# Patient Record
Sex: FEMALE | Race: WHITE | Employment: OTHER | URBAN - METROPOLITAN AREA
[De-identification: names, ages, dates, MRNs, and addresses within clinical notes are randomized per-mention and may not be internally consistent; named-entity substitution may affect disease eponyms.]

---

## 2017-01-17 RX ORDER — ACETAMINOPHEN 500 MG
TABLET ORAL
Qty: 180 TABLET | Refills: 8 | Status: SHIPPED | OUTPATIENT
Start: 2017-01-17 | End: 2018-04-13

## 2017-01-19 RX ORDER — TRAMADOL HYDROCHLORIDE 50 MG/1
TABLET ORAL
Qty: 120 TABLET | Refills: 5 | Status: SHIPPED | OUTPATIENT
Start: 2017-01-19 | End: 2017-08-25

## 2017-01-26 RX ORDER — LOSARTAN POTASSIUM 50 MG/1
TABLET ORAL
Qty: 30 TABLET | Refills: 9 | Status: SHIPPED | OUTPATIENT
Start: 2017-01-26 | End: 2017-09-12

## 2017-01-31 RX ORDER — ALLOPURINOL 300 MG/1
TABLET ORAL
Qty: 30 TABLET | Refills: 6 | Status: SHIPPED | OUTPATIENT
Start: 2017-01-31 | End: 2017-09-12

## 2017-01-31 RX ORDER — OMEPRAZOLE 20 MG/1
CAPSULE, DELAYED RELEASE ORAL
Qty: 30 CAPSULE | Refills: 6 | Status: SHIPPED | OUTPATIENT
Start: 2017-01-31 | End: 2017-09-12

## 2017-01-31 NOTE — TELEPHONE ENCOUNTER
LOV 10/24/16    Last refill Omeprazole 20 4/26/16 # 30 + 9                Allopuroinol 300 3/8/16 # 30 + 10

## 2017-03-02 ENCOUNTER — TELEPHONE (OUTPATIENT)
Dept: INTERNAL MEDICINE CLINIC | Facility: CLINIC | Age: 72
End: 2017-03-02

## 2017-03-02 RX ORDER — ERGOCALCIFEROL 1.25 MG/1
50000 CAPSULE ORAL
Qty: 3 CAPSULE | Refills: 3 | Status: SHIPPED | OUTPATIENT
Start: 2017-03-02 | End: 2017-04-01

## 2017-03-02 NOTE — TELEPHONE ENCOUNTER
Since Tamara's Vit D level is 23 and [facility] will pay only for 50, 000 IU ok to give 50,000 IU cap and have her take that once a month for a year. Check Vit D level in three months.   Ok for Verenice to give her 4  caps and 3 refills but make sure Shane Weaver

## 2017-03-02 NOTE — TELEPHONE ENCOUNTER
Chintan Alert from [address] [place] [address] [id number] is calling regarding Adal Moreno has a prescription for 200,000 units and her insurance will only cover 50,000 units. Please call Racheal Arauz back and advise if 50,000 units can be substituted.      Chintan Alert can be re

## 2017-03-02 NOTE — TELEPHONE ENCOUNTER
Order for Vit D in [facility] [address], will do in 3 months vs in March before OV.

## 2017-03-06 ENCOUNTER — TELEPHONE (OUTPATIENT)
Dept: INTERNAL MEDICINE CLINIC | Facility: CLINIC | Age: 72
End: 2017-03-06

## 2017-03-08 RX ORDER — ASPIRIN 81 MG
TABLET, DELAYED RELEASE (ENTERIC COATED) ORAL
Qty: 30 TABLET | Refills: 11 | Status: SHIPPED | OUTPATIENT
Start: 2017-03-08 | End: 2018-04-18

## 2017-03-21 RX ORDER — SITAGLIPTIN 50 MG/1
TABLET, FILM COATED ORAL
Qty: 30 TABLET | Refills: 3 | Status: SHIPPED | OUTPATIENT
Start: 2017-03-21 | End: 2017-07-17

## 2017-03-21 NOTE — TELEPHONE ENCOUNTER
LOV 10/24/16    Januvia 4/28/16 # 30 +11    Lyrica 100 5/2016 # 90 +5  Sig 3x a day    Routing Lyrica to you for refill

## 2017-03-23 ENCOUNTER — APPOINTMENT (OUTPATIENT)
Dept: LAB | Age: 72
End: 2017-03-23
Attending: INTERNAL MEDICINE
Payer: MEDICARE

## 2017-03-23 DIAGNOSIS — E55.9 VITAMIN D DEFICIENCY DISEASE: ICD-10-CM

## 2017-03-23 DIAGNOSIS — E78.00 HYPERCHOLESTEREMIA: ICD-10-CM

## 2017-03-23 LAB
25-HYDROXYVITAMIN D (TOTAL): 28.1 NG/ML (ref 30–100)
ALT SERPL-CCNC: 15 U/L (ref 14–54)
AST SERPL-CCNC: 14 U/L (ref 15–41)
CHOLEST SMN-MCNC: 126 MG/DL (ref ?–200)
HDLC SERPL-MCNC: 48 MG/DL (ref 45–?)
HDLC SERPL: 2.63 {RATIO} (ref ?–4.44)
LDLC SERPL CALC-MCNC: 44 MG/DL (ref ?–130)
NONHDLC SERPL-MCNC: 78 MG/DL (ref ?–130)
TRIGLYCERIDES: 171 MG/DL (ref ?–150)
VLDL: 34 MG/DL (ref 5–40)

## 2017-03-23 PROCEDURE — 82306 VITAMIN D 25 HYDROXY: CPT

## 2017-03-23 PROCEDURE — 36415 COLL VENOUS BLD VENIPUNCTURE: CPT

## 2017-03-23 PROCEDURE — 84460 ALANINE AMINO (ALT) (SGPT): CPT

## 2017-03-23 PROCEDURE — 84450 TRANSFERASE (AST) (SGOT): CPT

## 2017-03-23 PROCEDURE — 80061 LIPID PANEL: CPT

## 2017-03-27 ENCOUNTER — OFFICE VISIT (OUTPATIENT)
Dept: INTERNAL MEDICINE CLINIC | Facility: CLINIC | Age: 72
End: 2017-03-27

## 2017-03-27 VITALS
TEMPERATURE: 98 F | SYSTOLIC BLOOD PRESSURE: 124 MMHG | DIASTOLIC BLOOD PRESSURE: 76 MMHG | RESPIRATION RATE: 16 BRPM | HEART RATE: 72 BPM

## 2017-03-27 DIAGNOSIS — F32.A CHRONIC DEPRESSION: ICD-10-CM

## 2017-03-27 DIAGNOSIS — Z51.89 ENCOUNTER FOR MEDICATION ADJUSTMENT: Primary | ICD-10-CM

## 2017-03-27 DIAGNOSIS — E11.9 CONTROLLED TYPE 2 DIABETES MELLITUS WITHOUT COMPLICATION, WITHOUT LONG-TERM CURRENT USE OF INSULIN (HCC): ICD-10-CM

## 2017-03-27 DIAGNOSIS — L21.9 SEBORRHEIC DERMATITIS: ICD-10-CM

## 2017-03-27 DIAGNOSIS — S91.109A OPEN TOE WOUND, INITIAL ENCOUNTER: ICD-10-CM

## 2017-03-27 DIAGNOSIS — I10 ESSENTIAL HYPERTENSION: ICD-10-CM

## 2017-03-27 DIAGNOSIS — M79.2 NEUROPATHIC PAIN: ICD-10-CM

## 2017-03-27 DIAGNOSIS — G60.0 PERONEAL MUSCULAR ATROPHY: ICD-10-CM

## 2017-03-27 DIAGNOSIS — Z86.14 HISTORY OF MRSA INFECTION: ICD-10-CM

## 2017-03-27 PROCEDURE — 99214 OFFICE O/P EST MOD 30 MIN: CPT | Performed by: INTERNAL MEDICINE

## 2017-03-27 RX ORDER — DOXYCYCLINE HYCLATE 100 MG/1
100 CAPSULE ORAL 2 TIMES DAILY
Qty: 20 CAPSULE | Refills: 0 | Status: SHIPPED | OUTPATIENT
Start: 2017-03-27 | End: 2017-07-26 | Stop reason: ALTCHOICE

## 2017-03-27 RX ORDER — CYCLOBENZAPRINE HCL 10 MG
10 TABLET ORAL 3 TIMES DAILY
Qty: 60 TABLET | Refills: 3 | Status: SHIPPED | OUTPATIENT
Start: 2017-03-27 | End: 2017-04-16

## 2017-03-27 RX ORDER — FLUOXETINE HYDROCHLORIDE 40 MG/1
40 CAPSULE ORAL DAILY
COMMUNITY
Start: 2017-03-27 | End: 2017-04-24

## 2017-03-27 NOTE — PATIENT INSTRUCTIONS
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

## 2017-03-27 NOTE — PROGRESS NOTES
Patient presents with: Follow - Up: 6 month f/u, open sore second toe L foot       HPI: Jorge Beaver is here for 6 month f/u and sore on 2nd left foot, diabetes, skin lesion on forehead and right arm, cologuard? And pains in foot.      Sore on 2nd left toe: Natasha Tolentino atrophy (Charcot-Rima-Tooth disease)     Anxiety state     Hypertension     Osteoarthritis     ABDIAZIZ on CPAP     Gout     Hypertriglyceridemia     Severe obesity (BMI 35.0-39.9) (HCC)     Incontinence     Hx of right BKA (HCC)     At risk for falling     Ob 60 mg daily for OCD Disp: 30 capsule Rfl: 3   TRAMADOL HCL 50 MG Oral Tab TAKE 1 TO 2 TABLETS BY MOUTH TWO TIMES A DAY IF NEEDED FOR PAIN Disp: 120 tablet Rfl: 5   PRAVASTATIN SODIUM 20 MG Oral Tab TAKE ONE TABLET BY MOUTH AT BEDTIME Disp: 30 tablet Rfl: 7 Temp(Src) 98 °F (36.7 °C) (Oral)  Resp 16  Alert, in no distress  Lungs: Clear, no rales,rhonchi  Heart: S1S2 regular, no murmur, ectopy  GI: soft, nontender, no masses, BS +  Exts: no edema; left foot second toe with 1 cm ulcer open without drainage; toe Essential hypertension, controlled  Plan: CPM    (E11.9) Controlled type 2 diabetes mellitus without complication, without long-term current use of insulin ([facility] controlled?   Plan: HEMOGLOBIN A1C            (F32.9) Chronic depression, improved  Plan: CPM

## 2017-03-30 ENCOUNTER — NURSE ONLY (OUTPATIENT)
Dept: LAB | Age: 72
End: 2017-03-30
Attending: INTERNAL MEDICINE
Payer: MEDICARE

## 2017-03-30 DIAGNOSIS — Z86.14 HISTORY OF MRSA INFECTION: ICD-10-CM

## 2017-03-30 DIAGNOSIS — F42.9 OBSESSIVE-COMPULSIVE DISORDER, UNSPECIFIED TYPE: ICD-10-CM

## 2017-03-30 DIAGNOSIS — Z51.89 ENCOUNTER FOR MEDICATION ADJUSTMENT: Primary | ICD-10-CM

## 2017-03-30 DIAGNOSIS — I10 ESSENTIAL HYPERTENSION: ICD-10-CM

## 2017-03-30 DIAGNOSIS — E11.9 DIABETES MELLITUS WITHOUT COMPLICATION (HCC): ICD-10-CM

## 2017-03-30 DIAGNOSIS — G60.0 PERONEAL MUSCULAR ATROPHY: ICD-10-CM

## 2017-03-30 DIAGNOSIS — L97.521 ULCER OF TOE, LEFT, LIMITED TO BREAKDOWN OF SKIN (HCC): ICD-10-CM

## 2017-03-30 PROCEDURE — 99214 OFFICE O/P EST MOD 30 MIN: CPT | Performed by: INTERNAL MEDICINE

## 2017-04-02 PROBLEM — L97.509 ULCER OF TOE (HCC): Status: ACTIVE | Noted: 2017-04-02

## 2017-04-03 NOTE — PROGRESS NOTES
.Patient presents with: Follow - Up    HPI: Linda Resendiz is here for f/u and a toe wound      She has an open wound on the DIP of her 2nd toe of left foot. She is not sure how it happened but probably a burn from her shoe.   She has no feeling in her feet fr Hypertension     Osteoarthritis     ABDIAZIZ on CPAP     Gout     Hypertriglyceridemia     Severe obesity (BMI 35.0-39.9) (HCC)     Incontinence     Hx of right BKA (HCC)     At risk for falling     Obsessive compulsive disorder     History of amputation of fin MOUTH TWO TIMES A DAY IF NEEDED FOR PAIN Disp: 120 tablet Rfl: 5   MAPAP 500 MG Oral Tab TAKE 1 OR 2 TABLETS 3 TIMES A DAY FOR PAIN Disp: 180 tablet Rfl: 8   PRAVASTATIN SODIUM 20 MG Oral Tab TAKE ONE TABLET BY MOUTH AT BEDTIME Disp: 30 tablet Rfl: 7   MUP 2nd toe with mild erythema, no increased warmth, 0.75 cm superficial ulcer with clean base, no purulent drainage anterior surface DIP.    Bilateral barefoot skin diabetic exam is normal, visualized foot and the appearance is normal.  Bilateral monofilament/

## 2017-04-04 ENCOUNTER — TELEPHONE (OUTPATIENT)
Dept: INTERNAL MEDICINE CLINIC | Facility: CLINIC | Age: 72
End: 2017-04-04

## 2017-04-04 DIAGNOSIS — E53.8 VITAMIN B 12 DEFICIENCY: Primary | ICD-10-CM

## 2017-04-04 NOTE — TELEPHONE ENCOUNTER
Pls advise Praveena Left to get a Vit B 12 level first.  If normal she won't need. Otherwise we will decide based on the level.

## 2017-04-04 NOTE — TELEPHONE ENCOUNTER
Jorge Beaver is calling in with a question about her B12. Jorge Beaver receives monthly B12 injections and was just informed that her insurance will no longer cover the B12 injections. What should she do? Is there an alternative to the injections?  Please call May

## 2017-04-06 ENCOUNTER — TELEPHONE (OUTPATIENT)
Dept: INTERNAL MEDICINE CLINIC | Facility: CLINIC | Age: 72
End: 2017-04-06

## 2017-04-06 ENCOUNTER — APPOINTMENT (OUTPATIENT)
Dept: LAB | Age: 72
End: 2017-04-06
Attending: INTERNAL MEDICINE
Payer: MEDICARE

## 2017-04-06 DIAGNOSIS — E53.8 VITAMIN B 12 DEFICIENCY: Primary | ICD-10-CM

## 2017-04-06 DIAGNOSIS — E53.8 VITAMIN B12 DEFICIENCY: ICD-10-CM

## 2017-04-06 PROCEDURE — 82607 VITAMIN B-12: CPT

## 2017-04-06 PROCEDURE — 36415 COLL VENOUS BLD VENIPUNCTURE: CPT

## 2017-04-06 NOTE — TELEPHONE ENCOUNTER
Medicaid is no longer covering OTC meds and she has question how she is going to stay on Rantidine and XS Tyl as she cannot afford them. Ask Dr. Santhosh Bernabe if there is any prescription rx that could replace them?     Note,she is already on Tramadol for pain d

## 2017-04-06 NOTE — TELEPHONE ENCOUNTER
----- Message from Jonathan Betts MD sent at 4/6/2017  1:02 PM CDT -----  Tamara's Vit B 12 level is normal.  If she is eating red meat she no longer needs Vit B 12 injections. Recheck a level in 6 months.

## 2017-04-10 RX ORDER — RANITIDINE 150 MG/1
150 TABLET ORAL 2 TIMES DAILY
Qty: 60 TABLET | Refills: 5 | Status: CANCELLED | OUTPATIENT
Start: 2017-04-10

## 2017-04-10 NOTE — TELEPHONE ENCOUNTER
Call to [facility] to ask cost OOP for Ranitidine. Pharmacist have never filled this rx for pt.    Will call pt back re request.

## 2017-04-25 ENCOUNTER — TELEPHONE (OUTPATIENT)
Dept: INTERNAL MEDICINE CLINIC | Facility: CLINIC | Age: 72
End: 2017-04-25

## 2017-04-25 RX ORDER — GLIPIZIDE 10 MG/1
10 TABLET, FILM COATED, EXTENDED RELEASE ORAL DAILY
Qty: 30 TABLET | Refills: 2 | Status: SHIPPED | OUTPATIENT
Start: 2017-04-25 | End: 2017-07-24

## 2017-04-25 NOTE — TELEPHONE ENCOUNTER
Britta Leal is calling in wishing to speak with . Britta Leal states that it is not a medical question.  She will be home the rest of today at [facility] is also calling in requesting three new prescriptions to be sent to [facility]

## 2017-04-26 RX ORDER — CLOTRIMAZOLE 1 %
CREAM (GRAM) TOPICAL
Qty: 60 G | Refills: 3 | Status: SHIPPED | OUTPATIENT
Start: 2017-04-26 | End: 2017-09-12

## 2017-05-08 ENCOUNTER — TELEPHONE (OUTPATIENT)
Dept: INTERNAL MEDICINE CLINIC | Facility: CLINIC | Age: 72
End: 2017-05-08

## 2017-05-08 ENCOUNTER — OFFICE VISIT (OUTPATIENT)
Dept: INTERNAL MEDICINE CLINIC | Facility: CLINIC | Age: 72
End: 2017-05-08

## 2017-05-08 VITALS
BODY MASS INDEX: 43.4 KG/M2 | SYSTOLIC BLOOD PRESSURE: 122 MMHG | HEART RATE: 72 BPM | WEIGHT: 293 LBS | HEIGHT: 69 IN | DIASTOLIC BLOOD PRESSURE: 68 MMHG | TEMPERATURE: 98 F

## 2017-05-08 DIAGNOSIS — E11.42 CONTROLLED TYPE 2 DIABETES MELLITUS WITH DIABETIC POLYNEUROPATHY, WITHOUT LONG-TERM CURRENT USE OF INSULIN (HCC): ICD-10-CM

## 2017-05-08 DIAGNOSIS — G60.0 PERONEAL MUSCULAR ATROPHY: Primary | ICD-10-CM

## 2017-05-08 DIAGNOSIS — F32.A CHRONIC DEPRESSION: ICD-10-CM

## 2017-05-08 DIAGNOSIS — G89.4 CHRONIC PAIN SYNDROME: ICD-10-CM

## 2017-05-08 PROCEDURE — 99214 OFFICE O/P EST MOD 30 MIN: CPT | Performed by: INTERNAL MEDICINE

## 2017-05-08 NOTE — PROGRESS NOTES
Phoebe Aguirre is a 66year old female.   Patient presents with:  Depression  Diabetes  Hypertension      HPI:     Patient here to establish care and go over several issues today, with Dr. Mindi Bedolla for many years-  Charcot-Rima Tooth disease with weakness and mouth daily. Disp: 30 tablet Rfl: 2   Mirabegron ER (MYRBETRIQ) 50 MG Oral Tablet 24 Hr Take 1 tablet by mouth once daily. Disp: 30 tablet Rfl: 2   FLUoxetine HCl 40 MG Oral Cap Take 1 capsule (40 mg total) by mouth daily.  Disp: 30 capsule Rfl: 2   Prazosi NEEDED Disp: 22 g Rfl: 4   OYSTER SHELL CALCIUM/D 500-200 MG-UNIT Oral Tab TAKE ONE TABLET BY MOUTH 2 TIMES A DAY Disp: 60 tablet Rfl: 6   LORATADINE 10 MG Oral Tab TAKE ONE TABLET BY MOUTH DAILY Disp: 30 tablet Rfl: 6   MAPAP 500 MG Oral Tab TAKE 1 OR 2 T Allergies    Adhesive Tape             Aspartame                 Clindamycin             Diarrhea  Lisinopril              Coughing  Other                       Comment:Please see extensive Neurotoxic Medication List in             Media before prescri pain service      Orders Placed This Encounter  Comp Metabolic Panel (14) [E]  Hemoglobin A1C [E]    Meds & Refills for this Visit:  No prescriptions requested or ordered in this encounter    Imaging & Consults:  OP REFERRAL PAIN MANGEMENT    Return in abo

## 2017-06-12 ENCOUNTER — TELEPHONE (OUTPATIENT)
Dept: SURGERY | Facility: CLINIC | Age: 72
End: 2017-06-12

## 2017-06-20 RX ORDER — GLIPIZIDE 10 MG/1
TABLET, FILM COATED, EXTENDED RELEASE ORAL
Qty: 30 TABLET | Refills: 0 | OUTPATIENT
Start: 2017-06-20

## 2017-06-23 ENCOUNTER — TELEPHONE (OUTPATIENT)
Dept: INTERNAL MEDICINE CLINIC | Facility: CLINIC | Age: 72
End: 2017-06-23

## 2017-06-23 NOTE — TELEPHONE ENCOUNTER
Received [facility] rx to be signed and fax back . Roland Freeman  Placed on TB bin to review and sign

## 2017-07-18 RX ORDER — SITAGLIPTIN 50 MG/1
TABLET, FILM COATED ORAL
Qty: 30 TABLET | Refills: 0 | Status: SHIPPED | OUTPATIENT
Start: 2017-07-18 | End: 2017-09-18

## 2017-07-18 RX ORDER — MIRABEGRON 50 MG/1
TABLET, FILM COATED, EXTENDED RELEASE ORAL
Qty: 30 TABLET | Refills: 0 | Status: SHIPPED | OUTPATIENT
Start: 2017-07-18 | End: 2017-09-18

## 2017-07-20 ENCOUNTER — TELEPHONE (OUTPATIENT)
Dept: INTERNAL MEDICINE CLINIC | Facility: CLINIC | Age: 72
End: 2017-07-20

## 2017-07-21 ENCOUNTER — TELEPHONE (OUTPATIENT)
Dept: INTERNAL MEDICINE CLINIC | Facility: CLINIC | Age: 72
End: 2017-07-21

## 2017-07-24 RX ORDER — GLIPIZIDE 10 MG/1
TABLET, FILM COATED, EXTENDED RELEASE ORAL
Qty: 30 TABLET | Refills: 0 | Status: SHIPPED | OUTPATIENT
Start: 2017-07-24 | End: 2017-08-22

## 2017-07-26 NOTE — PROGRESS NOTES
Lab called and stated that pt was having difficulty getting on and off the toilet in the lab. Having difficulty giving urine sample. Dr. Stefani Little and providers notified. Per Dr. Stefani Little okay for patient to not give urine sample and to receive RX. Lab updated.

## 2017-07-26 NOTE — PATIENT INSTRUCTIONS
Refill policies:    • Allow 2-3 business days for refills; controlled substances may take longer.   • Contact your pharmacy at least 5 days prior to running out of medication and have them send an electronic request or submit request through the [facility] have a procedure or additional testing performed. [facility]) will contact your insurance carrier to obtain pre-certification or prior authorization.     Unfortunately, [facility] has seen an increase in denial of payment even though the p

## 2017-07-26 NOTE — PROGRESS NOTES
HPI:    Patient ID: Regis Christian is a 66year old female. HPI    Review of Systems         Current Outpatient Prescriptions:  FLUoxetine HCl 40 MG Oral Cap Take 1 capsule (40 mg total) by mouth daily.  Disp: 30 capsule Rfl: 3   Prazosin HCl 2 MG Oral PRAVASTATIN SODIUM 20 MG Oral Tab TAKE ONE TABLET BY MOUTH AT BEDTIME Disp: 30 tablet Rfl: 7   MUPIROCIN 2 % External Ointment APPLY TO OPEN AREA ON LEG 2 TIMES A DAY AS NEEDED Disp: 22 g Rfl: 4   OYSTER SHELL CALCIUM/D 500-200 MG-UNIT Oral Tab TAKE ONE TA

## 2017-07-27 ENCOUNTER — TELEPHONE (OUTPATIENT)
Dept: INTERNAL MEDICINE CLINIC | Facility: CLINIC | Age: 72
End: 2017-07-27

## 2017-07-30 NOTE — PROGRESS NOTES
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 [address]    • Environmental allergies     dust mold    • Essential hypertension    • Food diane Disp: 28 g Rfl: 10   OMEPRAZOLE 20 MG Oral Capsule Delayed Release TAKE ONE CAPSULE BY MOUTH EVERY MORNING Disp: 30 capsule Rfl: 6   ALLOPURINOL 300 MG Oral Tab TAKE ONE TABLET BY MOUTH EVERY MORNING Disp: 30 tablet Rfl: 6   LOSARTAN POTASSIUM 50 MG Oral T Disease Mother      Smoking status: Never Smoker                                                              Smokeless tobacco: Never Used                      Alcohol use:  No                Review of  other systems:  Constitutional: negative for fever, w worse. Spurling’s test is negative. Merline maneuver is normal. Radial pulsation is palpable bilaterally. Phalen’s and Tinnel’s sign is negative. Lhermitte’s test is negative.   Motor Examination:    (R)   (L)  Deltoid:      5    5  Biceps:       5    5  T N   Tamayo:    N    N   Ankle Clonus:    N                          N  Sensory Examination:    (R)   (L)   LI:      N                          N   L2:      N     N   L3:      N               N   L4:      N                          N   L5:      N

## 2017-08-03 ENCOUNTER — LAB ENCOUNTER (OUTPATIENT)
Dept: LAB | Age: 72
End: 2017-08-03
Attending: INTERNAL MEDICINE
Payer: MEDICARE

## 2017-08-03 DIAGNOSIS — E11.42 DIABETIC POLYNEUROPATHY ASSOCIATED WITH TYPE 2 DIABETES MELLITUS (HCC): ICD-10-CM

## 2017-08-03 LAB
EST. AVERAGE GLUCOSE BLD GHB EST-MCNC: 120 MG/DL (ref 68–126)
HBA1C MFR BLD HPLC: 5.8 % (ref ?–5.7)

## 2017-08-03 PROCEDURE — 36415 COLL VENOUS BLD VENIPUNCTURE: CPT

## 2017-08-03 PROCEDURE — 83036 HEMOGLOBIN GLYCOSYLATED A1C: CPT

## 2017-08-07 ENCOUNTER — OFFICE VISIT (OUTPATIENT)
Dept: INTERNAL MEDICINE CLINIC | Facility: CLINIC | Age: 72
End: 2017-08-07

## 2017-08-07 VITALS
RESPIRATION RATE: 16 BRPM | HEART RATE: 80 BPM | HEIGHT: 69 IN | WEIGHT: 293 LBS | DIASTOLIC BLOOD PRESSURE: 60 MMHG | TEMPERATURE: 98 F | SYSTOLIC BLOOD PRESSURE: 114 MMHG | BODY MASS INDEX: 43.4 KG/M2

## 2017-08-07 DIAGNOSIS — R21 RASH OF GROIN: Primary | ICD-10-CM

## 2017-08-07 DIAGNOSIS — G89.29 CHRONIC RIGHT SHOULDER PAIN: ICD-10-CM

## 2017-08-07 DIAGNOSIS — G89.29 CHRONIC BILATERAL LOW BACK PAIN WITHOUT SCIATICA: ICD-10-CM

## 2017-08-07 DIAGNOSIS — E11.42 CONTROLLED TYPE 2 DIABETES MELLITUS WITH DIABETIC POLYNEUROPATHY, WITHOUT LONG-TERM CURRENT USE OF INSULIN (HCC): ICD-10-CM

## 2017-08-07 DIAGNOSIS — M25.511 CHRONIC RIGHT SHOULDER PAIN: ICD-10-CM

## 2017-08-07 DIAGNOSIS — M54.50 CHRONIC BILATERAL LOW BACK PAIN WITHOUT SCIATICA: ICD-10-CM

## 2017-08-07 PROCEDURE — 99214 OFFICE O/P EST MOD 30 MIN: CPT | Performed by: INTERNAL MEDICINE

## 2017-08-07 NOTE — PROGRESS NOTES
Regis Christian is a 66year old female. Patient presents with:   Follow - Up: 3 month  Derm Problem  Diabetes      HPI:     Patient with Charcot-Rima Tooth disease with weakness and neuropathy, she has chronic pain from this along with OA and phantom maritza 60 capsule Rfl: 3   BusPIRone HCl 10 MG Oral Tab Take 2 tablets (20 mg total) by mouth 2 (two) times daily.  Disp: 120 tablet Rfl: 3   FLUoxetine HCl (PROZAC) 20 MG Oral Cap Take one tablet along with the prozac 40 mg daily for a total dose of 60 mg daily f Tab TAKE ONE TABLET BY MOUTH DAILY Disp: 30 tablet Rfl: 6   MAPAP 500 MG Oral Tab TAKE 1 OR 2 TABLETS 3 TIMES A DAY FOR PAIN Disp: 180 tablet Rfl: 0   Calcium Carbonate-Vitamin D (OYSTER SHELL CALCIUM/D) 500-200 MG-UNIT Oral Tab Take 1 tablet by mouth 2 (t above  RESPIRATORY:  no cough  CARDIOVASCULAR: denies chest pain   GI: denies abdominal pain   : no dysuria, hematuria  NEURO: denies headaches, dizziness  RHEUM: chronic back pain  HEME: No adenopathy      EXAM:   /60 (BP Location: Left arm, Patie

## 2017-08-14 RX ORDER — PRAVASTATIN SODIUM 20 MG
TABLET ORAL
Qty: 30 TABLET | Refills: 0 | OUTPATIENT
Start: 2017-08-14

## 2017-08-16 ENCOUNTER — TELEPHONE (OUTPATIENT)
Dept: SURGERY | Facility: CLINIC | Age: 72
End: 2017-08-16

## 2017-08-16 RX ORDER — TRAMADOL HYDROCHLORIDE 100 MG/1
100 TABLET, EXTENDED RELEASE ORAL DAILY
Qty: 30 TABLET | Refills: 0 | Status: SHIPPED | OUTPATIENT
Start: 2017-08-16 | End: 2017-08-25

## 2017-08-16 NOTE — TELEPHONE ENCOUNTER
Received call from pt pharmacy -spoke with Lexa Mack from [facility]- asking for refill for Tramadol.  Will process refill request.

## 2017-08-16 NOTE — TELEPHONE ENCOUNTER
Medication: Tramadol 100mg ER    Date of last refill: 7/26/2017  [place] reviewed 8/34/8861    Last office visit: 7/26/2017  Due back to clinic per last office note:  Not Noted  Date next office visit scheduled:  Not Corey

## 2017-08-17 NOTE — TELEPHONE ENCOUNTER
Pharmacy calling to see if RX can be faxed ASAP. Pt is out of medication. Re-educated on refill policy.

## 2017-08-21 ENCOUNTER — TELEPHONE (OUTPATIENT)
Dept: INTERNAL MEDICINE CLINIC | Facility: CLINIC | Age: 72
End: 2017-08-21

## 2017-08-21 NOTE — TELEPHONE ENCOUNTER
Pt saw Dr Duong Vallecillo and was given a new medication which she is refusing to fill (she has the script with her) stated she doesn't feel like switching to something new due only was seen 2 minutes into the appt and was given this new Tramadol ER 100mg.  She has al

## 2017-08-22 RX ORDER — GLIPIZIDE 10 MG/1
TABLET, FILM COATED, EXTENDED RELEASE ORAL
Qty: 30 TABLET | Refills: 0 | Status: SHIPPED | OUTPATIENT
Start: 2017-08-22 | End: 2017-09-18

## 2017-08-22 NOTE — TELEPHONE ENCOUNTER
LOV 7/26/17 with Dr Chris Moody -Noted at ov :  I will recommend her to continue Lyrica 100 mg p.o. 3 times daily, tramadol 50 mg every 6 as needed and I will add tramadol  mg daily.   She will not be a candidate for interventional treatment at this poin

## 2017-08-22 NOTE — TELEPHONE ENCOUNTER
Spoke to Janina regarding pt Tramadol Rx. Pt Rx changed to Tramadol 50 mg from Tramadol  mg and the script has  sig of do not fill before 8/24/17. RN notified pharm tech that this still stands true for the new Rx.  Our pt are aware of 30 days refi

## 2017-08-25 ENCOUNTER — TELEPHONE (OUTPATIENT)
Dept: INTERNAL MEDICINE CLINIC | Facility: CLINIC | Age: 72
End: 2017-08-25

## 2017-08-25 ENCOUNTER — TELEPHONE (OUTPATIENT)
Dept: SURGERY | Facility: CLINIC | Age: 72
End: 2017-08-25

## 2017-08-25 RX ORDER — TRAMADOL HYDROCHLORIDE 50 MG/1
TABLET ORAL
Qty: 60 TABLET | Refills: 0 | OUTPATIENT
Start: 2017-08-25 | End: 2017-09-12

## 2017-08-25 RX ORDER — TRAMADOL HYDROCHLORIDE 100 MG/1
100 TABLET, EXTENDED RELEASE ORAL DAILY
Qty: 60 TABLET | Refills: 0 | Status: CANCELLED | OUTPATIENT
Start: 2017-08-25

## 2017-08-25 NOTE — TELEPHONE ENCOUNTER
Prescription faxed to [facility] pharmacy, fax [phone] received confirmation sent to triage.

## 2017-08-25 NOTE — TELEPHONE ENCOUNTER
Patient contacted office with multiple complaints regarding recent visits and prescriptions from our office. Patient seen for first visit in clinic in July. Patient states she was unhappy with the visit for a few reasons.  First, patient is wheelchair florencia Tramadol (not Tramadol ER). Patient extremely upset that not only was she provided with a Tramadol ER script again (which patient did not want) but that prescription was postdated and patient went more than a a week without medication.  Patient then states

## 2017-08-25 NOTE — TELEPHONE ENCOUNTER
LOV 8/7/17 TB- Pt was to RTC 11/7/17    Pt states that she saw Dr Allie Seymour had a bad experience and will not be f/u w him and TB is aware. Pt was rx tramadol ER 100mg that Pt did not refill.  Pt states that she picked up her original rx on 8/24/17 for Tramadol

## 2017-08-25 NOTE — TELEPHONE ENCOUNTER
Called pt to advise info per TB. Pt verbalized understanding, agreed with POC and had  no further questions. Would like Rx faxed.

## 2017-08-25 NOTE — TELEPHONE ENCOUNTER
Patient calling very upset asking to speak with TB multiple times, in regards to her Tramadol rx. States that she has gone a week and half without her medication. ALso wants to speak about her referral to Don Keating and his medication management.

## 2017-08-28 RX ORDER — OMEPRAZOLE 20 MG/1
CAPSULE, DELAYED RELEASE ORAL
Qty: 30 CAPSULE | Refills: 0 | OUTPATIENT
Start: 2017-08-28

## 2017-08-28 RX ORDER — LORATADINE 10 MG/1
TABLET ORAL
Qty: 30 TABLET | Refills: 0 | OUTPATIENT
Start: 2017-08-28

## 2017-08-28 RX ORDER — ALLOPURINOL 300 MG/1
TABLET ORAL
Qty: 30 TABLET | Refills: 0 | OUTPATIENT
Start: 2017-08-28

## 2017-08-30 NOTE — TELEPHONE ENCOUNTER
Patient states she has had time to [facility] off\" since our conversation last week, states she would like to give Dr. Jerris Cranker another chance. Patient requesting to schedule additional visit to ask Dr. Jerris Cranker questions she was unable to previously.  Patient schedul

## 2017-09-05 RX ORDER — CLOTRIMAZOLE 1 %
CREAM (GRAM) TOPICAL
Qty: 60 G | Refills: 0 | OUTPATIENT
Start: 2017-09-05

## 2017-09-12 ENCOUNTER — TELEPHONE (OUTPATIENT)
Dept: INTERNAL MEDICINE CLINIC | Facility: CLINIC | Age: 72
End: 2017-09-12

## 2017-09-12 RX ORDER — LOSARTAN POTASSIUM 50 MG/1
TABLET ORAL
Qty: 30 TABLET | Refills: 9 | Status: SHIPPED | OUTPATIENT
Start: 2017-09-12 | End: 2018-06-04

## 2017-09-12 RX ORDER — LORATADINE 10 MG/1
10 TABLET ORAL
Qty: 30 TABLET | Refills: 6 | Status: SHIPPED | OUTPATIENT
Start: 2017-09-12 | End: 2018-03-26

## 2017-09-12 RX ORDER — PREGABALIN 100 MG/1
CAPSULE ORAL
Qty: 90 CAPSULE | Refills: 2 | Status: SHIPPED | OUTPATIENT
Start: 2017-09-12 | End: 2017-12-12

## 2017-09-12 RX ORDER — PRAVASTATIN SODIUM 20 MG
TABLET ORAL
Qty: 30 TABLET | Refills: 7 | Status: SHIPPED | OUTPATIENT
Start: 2017-09-12 | End: 2018-05-15

## 2017-09-12 RX ORDER — TRAMADOL HYDROCHLORIDE 50 MG/1
TABLET ORAL
Qty: 120 TABLET | Refills: 1 | Status: SHIPPED | OUTPATIENT
Start: 2017-09-12 | End: 2017-12-06

## 2017-09-12 RX ORDER — CLOTRIMAZOLE 1 %
CREAM (GRAM) TOPICAL
Qty: 60 G | Refills: 3 | Status: SHIPPED | OUTPATIENT
Start: 2017-09-12 | End: 2018-01-08

## 2017-09-12 RX ORDER — ALLOPURINOL 300 MG/1
TABLET ORAL
Qty: 30 TABLET | Refills: 6 | Status: SHIPPED | OUTPATIENT
Start: 2017-09-12 | End: 2018-03-26

## 2017-09-12 RX ORDER — PRAVASTATIN SODIUM 20 MG
TABLET ORAL
Qty: 30 TABLET | Refills: 0 | OUTPATIENT
Start: 2017-09-12

## 2017-09-12 RX ORDER — OMEPRAZOLE 20 MG/1
CAPSULE, DELAYED RELEASE ORAL
Qty: 30 CAPSULE | Refills: 6 | Status: SHIPPED | OUTPATIENT
Start: 2017-09-12 | End: 2018-03-26

## 2017-09-12 NOTE — TELEPHONE ENCOUNTER
Previous pt of Dr Daron Mckee will need refills for the following. ...     LORATADINE 10 MG Oral Tab [Pharmacy Med Name: LORATADINE 10 MG TABLET] 30 tablet 0 8/28/2017    Sig:  TAKE ONE TABLET BY MOUTH DAILY               OMEPRAZOLE 20 MG Oral Capsule Delayed Relea

## 2017-09-13 NOTE — TELEPHONE ENCOUNTER
Prescription Tramadol and Pregabalin was sent to [facility], fax [phone] received confirmation, sent to triage.

## 2017-09-18 RX ORDER — MIRABEGRON 50 MG/1
TABLET, FILM COATED, EXTENDED RELEASE ORAL
Qty: 30 TABLET | Refills: 0 | Status: SHIPPED | OUTPATIENT
Start: 2017-09-18 | End: 2017-10-16

## 2017-09-18 RX ORDER — SITAGLIPTIN 50 MG/1
TABLET, FILM COATED ORAL
Qty: 30 TABLET | Refills: 0 | Status: SHIPPED | OUTPATIENT
Start: 2017-09-18 | End: 2017-10-16

## 2017-09-18 RX ORDER — GLIPIZIDE 10 MG/1
TABLET, FILM COATED, EXTENDED RELEASE ORAL
Qty: 30 TABLET | Refills: 0 | Status: SHIPPED | OUTPATIENT
Start: 2017-09-18 | End: 2017-10-16

## 2017-09-20 ENCOUNTER — OFFICE VISIT (OUTPATIENT)
Dept: SURGERY | Facility: CLINIC | Age: 72
End: 2017-09-20

## 2017-09-20 VITALS — HEART RATE: 73 BPM | BODY MASS INDEX: 45 KG/M2 | WEIGHT: 293 LBS | RESPIRATION RATE: 20 BRPM | OXYGEN SATURATION: 91 %

## 2017-09-20 DIAGNOSIS — G60.9 IDIOPATHIC PERIPHERAL NEUROPATHY: Primary | ICD-10-CM

## 2017-09-20 PROCEDURE — 99213 OFFICE O/P EST LOW 20 MIN: CPT | Performed by: ANESTHESIOLOGY

## 2017-09-20 RX ORDER — CHOLECALCIFEROL (VITAMIN D3) 1250 MCG
CAPSULE ORAL
COMMUNITY
End: 2018-06-04

## 2017-09-20 RX ORDER — NEOMYCIN SULFATE, POLYMYXIN B SULFATE AND DEXAMETHASONE 3.5; 10000; 1 MG/ML; [USP'U]/ML; MG/ML
SUSPENSION/ DROPS OPHTHALMIC
COMMUNITY
Start: 2017-09-15 | End: 2018-06-04

## 2017-09-20 NOTE — PATIENT INSTRUCTIONS
Refill policies:    • Allow 2-3 business days for refills; controlled substances may take longer.   • Contact your pharmacy at least 5 days prior to running out of medication and have them send an electronic request or submit request through the [facility] have a procedure or additional testing performed. [facility]) will contact your insurance carrier to obtain pre-certification or prior authorization.     Unfortunately, [facility] has seen an increase in denial of payment even though the p

## 2017-09-22 NOTE — PROGRESS NOTES
Name: Ishan Plaza   : 1945   DOS: 2017     Pain Clinic Follow Up Visit:   Ishan Plaza is a 66year old female who presents for recheck of her chronic neck, lower back and generalized neuropathic pain for more than 25 years.   She was s TAKE ONE TABLET BY MOUTH EVERY MORNING Disp: 30 tablet Rfl: 6   pregabalin (LYRICA) 100 MG Oral Cap TAKE ONE CAPSULE BY MOUTH 3 TIMES A DAY Disp: 90 capsule Rfl: 2   FLUoxetine HCl 40 MG Oral Cap Take 1 capsule (40 mg total) by mouth daily.  Disp: 30 capsul makes the pain worse. Elevation of the head makes the pain better, compression of  the head makes the pain worse. Spurling’s test is negative. Aromas maneuver is normal. Radial pulsation is palpable bilaterally. Phalen’s and Tinnel’s sign is negative.  Lh N               C7:                                                           N                                    N               C8:                                                           N 5                                     5  Gastrocsoleus:                                         5                                     5     Deep Tendon Reflexes:                                   (R) write tramadol so patient does not need to come to the pain clinic and also this is the policy of the clinic to do the urine drug tox screening. Orders:No orders of the defined types were placed in this encounter.       Medications filled today:  No pres

## 2017-10-16 RX ORDER — SITAGLIPTIN 50 MG/1
TABLET, FILM COATED ORAL
Qty: 30 TABLET | Refills: 0 | Status: SHIPPED | OUTPATIENT
Start: 2017-10-16 | End: 2017-11-20

## 2017-10-16 RX ORDER — MIRABEGRON 50 MG/1
TABLET, FILM COATED, EXTENDED RELEASE ORAL
Qty: 30 TABLET | Refills: 0 | Status: SHIPPED | OUTPATIENT
Start: 2017-10-16 | End: 2017-11-13

## 2017-10-16 RX ORDER — GLIPIZIDE 10 MG/1
TABLET, FILM COATED, EXTENDED RELEASE ORAL
Qty: 30 TABLET | Refills: 0 | Status: SHIPPED | OUTPATIENT
Start: 2017-10-16 | End: 2017-11-20

## 2017-10-18 ENCOUNTER — TELEPHONE (OUTPATIENT)
Dept: INTERNAL MEDICINE CLINIC | Facility: CLINIC | Age: 72
End: 2017-10-18

## 2017-10-20 ENCOUNTER — TELEPHONE (OUTPATIENT)
Dept: INTERNAL MEDICINE CLINIC | Facility: CLINIC | Age: 72
End: 2017-10-20

## 2017-10-23 RX ORDER — B-COMPLEX WITH VITAMIN C
TABLET ORAL
Qty: 60 TABLET | Refills: 0 | OUTPATIENT
Start: 2017-10-23

## 2017-11-08 RX ORDER — WATER
LIQUID (ML) MISCELLANEOUS
Qty: 3780 ML | Refills: 10 | Status: SHIPPED | OUTPATIENT
Start: 2017-11-08 | End: 2018-06-04

## 2017-11-08 NOTE — TELEPHONE ENCOUNTER
Pharm called just now stating they need a verbal ok to fill this today for the patient-please call them back at [phone]

## 2017-11-13 RX ORDER — MIRABEGRON 50 MG/1
TABLET, FILM COATED, EXTENDED RELEASE ORAL
Qty: 30 TABLET | Refills: 0 | Status: SHIPPED | OUTPATIENT
Start: 2017-11-13 | End: 2017-12-12

## 2017-11-20 RX ORDER — SITAGLIPTIN 50 MG/1
TABLET, FILM COATED ORAL
Qty: 30 TABLET | Refills: 0 | Status: SHIPPED | OUTPATIENT
Start: 2017-11-20 | End: 2017-12-18

## 2017-11-20 RX ORDER — GLIPIZIDE 10 MG/1
TABLET, FILM COATED, EXTENDED RELEASE ORAL
Qty: 30 TABLET | Refills: 0 | Status: SHIPPED | OUTPATIENT
Start: 2017-11-20 | End: 2017-12-18

## 2017-11-21 ENCOUNTER — APPOINTMENT (OUTPATIENT)
Dept: LAB | Age: 72
End: 2017-11-21
Attending: INTERNAL MEDICINE
Payer: MEDICARE

## 2017-11-21 DIAGNOSIS — E11.69 CONTROLLED TYPE 2 DIABETES MELLITUS WITH OTHER SPECIFIED COMPLICATION, WITHOUT LONG-TERM CURRENT USE OF INSULIN (HCC): ICD-10-CM

## 2017-11-21 PROCEDURE — 36415 COLL VENOUS BLD VENIPUNCTURE: CPT

## 2017-11-21 PROCEDURE — 83036 HEMOGLOBIN GLYCOSYLATED A1C: CPT

## 2017-11-21 PROCEDURE — 80053 COMPREHEN METABOLIC PANEL: CPT

## 2017-11-28 RX ORDER — B-COMPLEX WITH VITAMIN C
TABLET ORAL
Qty: 60 TABLET | Refills: 0 | OUTPATIENT
Start: 2017-11-28

## 2017-12-07 RX ORDER — TRAMADOL HYDROCHLORIDE 50 MG/1
TABLET ORAL
Qty: 120 TABLET | Refills: 0 | Status: SHIPPED | OUTPATIENT
Start: 2017-12-07 | End: 2017-12-20

## 2017-12-12 RX ORDER — MIRABEGRON 50 MG/1
TABLET, FILM COATED, EXTENDED RELEASE ORAL
Qty: 30 TABLET | Refills: 10 | Status: SHIPPED | OUTPATIENT
Start: 2017-12-12 | End: 2018-06-04

## 2017-12-12 NOTE — TELEPHONE ENCOUNTER
Last Office Visit: 8-7-17 with TB for 3 month follow up   Last Rx Filled: 9-12-17 90 tabs with 2 refills   Last Labs: 11-21-17 cmp/hga1c  Future Appointment: 12-20-17    Per protocol to provider

## 2017-12-12 NOTE — TELEPHONE ENCOUNTER
LOV: 8/7/17  Future office visit: 12/20/2017   Last labs: 11/21/17 Cmp A1C   Last RX: 11/13/17 #30 no Refills  Per protocol: Route to provider

## 2017-12-14 RX ORDER — PREGABALIN 100 MG/1
100 CAPSULE ORAL 3 TIMES DAILY
Qty: 90 CAPSULE | Refills: 1 | Status: SHIPPED | OUTPATIENT
Start: 2017-12-14 | End: 2017-12-20

## 2017-12-14 NOTE — TELEPHONE ENCOUNTER
[facility] awaiting approval for refill on Lyrica. Pt had only enough med for this am.  Please advise. Call Lena Zavala at [employer] [phone].

## 2017-12-18 RX ORDER — B-COMPLEX WITH VITAMIN C
TABLET ORAL
Qty: 60 TABLET | Refills: 0 | OUTPATIENT
Start: 2017-12-18

## 2017-12-19 RX ORDER — GLIPIZIDE 10 MG/1
TABLET, FILM COATED, EXTENDED RELEASE ORAL
Qty: 30 TABLET | Refills: 0 | Status: SHIPPED | OUTPATIENT
Start: 2017-12-19 | End: 2017-12-20

## 2017-12-19 RX ORDER — SITAGLIPTIN 50 MG/1
TABLET, FILM COATED ORAL
Qty: 30 TABLET | Refills: 0 | Status: SHIPPED | OUTPATIENT
Start: 2017-12-19 | End: 2017-12-20

## 2017-12-20 ENCOUNTER — TELEPHONE (OUTPATIENT)
Dept: INTERNAL MEDICINE CLINIC | Facility: CLINIC | Age: 72
End: 2017-12-20

## 2017-12-20 ENCOUNTER — OFFICE VISIT (OUTPATIENT)
Dept: INTERNAL MEDICINE CLINIC | Facility: CLINIC | Age: 72
End: 2017-12-20

## 2017-12-20 VITALS
RESPIRATION RATE: 15 BRPM | DIASTOLIC BLOOD PRESSURE: 70 MMHG | SYSTOLIC BLOOD PRESSURE: 130 MMHG | TEMPERATURE: 98 F | WEIGHT: 293 LBS | BODY MASS INDEX: 44.41 KG/M2 | HEIGHT: 68 IN | HEART RATE: 72 BPM

## 2017-12-20 DIAGNOSIS — F32.A ANXIETY AND DEPRESSION: ICD-10-CM

## 2017-12-20 DIAGNOSIS — F41.9 ANXIETY AND DEPRESSION: ICD-10-CM

## 2017-12-20 DIAGNOSIS — E78.49 OTHER HYPERLIPIDEMIA: ICD-10-CM

## 2017-12-20 DIAGNOSIS — Z12.11 SCREEN FOR COLON CANCER: ICD-10-CM

## 2017-12-20 DIAGNOSIS — I10 ESSENTIAL HYPERTENSION: ICD-10-CM

## 2017-12-20 DIAGNOSIS — Z00.00 WELLNESS EXAMINATION: Primary | ICD-10-CM

## 2017-12-20 DIAGNOSIS — Z89.511 HX OF RIGHT BKA (HCC): ICD-10-CM

## 2017-12-20 DIAGNOSIS — G47.33 OSA ON CPAP: ICD-10-CM

## 2017-12-20 DIAGNOSIS — E11.42 CONTROLLED TYPE 2 DIABETES MELLITUS WITH DIABETIC POLYNEUROPATHY, WITHOUT LONG-TERM CURRENT USE OF INSULIN (HCC): ICD-10-CM

## 2017-12-20 DIAGNOSIS — G89.4 CHRONIC PAIN SYNDROME: ICD-10-CM

## 2017-12-20 DIAGNOSIS — Z12.31 ENCOUNTER FOR SCREENING MAMMOGRAM FOR MALIGNANT NEOPLASM OF BREAST: ICD-10-CM

## 2017-12-20 DIAGNOSIS — Z23 NEED FOR PNEUMOCOCCAL VACCINATION: ICD-10-CM

## 2017-12-20 DIAGNOSIS — M79.2 NEUROPATHIC PAIN: ICD-10-CM

## 2017-12-20 DIAGNOSIS — Z99.89 OSA ON CPAP: ICD-10-CM

## 2017-12-20 DIAGNOSIS — F41.1 ANXIETY STATE: ICD-10-CM

## 2017-12-20 DIAGNOSIS — G60.0 PERONEAL MUSCULAR ATROPHY: ICD-10-CM

## 2017-12-20 PROCEDURE — 90732 PPSV23 VACC 2 YRS+ SUBQ/IM: CPT | Performed by: INTERNAL MEDICINE

## 2017-12-20 PROCEDURE — G0009 ADMIN PNEUMOCOCCAL VACCINE: HCPCS | Performed by: INTERNAL MEDICINE

## 2017-12-20 PROCEDURE — G0439 PPPS, SUBSEQ VISIT: HCPCS | Performed by: INTERNAL MEDICINE

## 2017-12-20 RX ORDER — TRAMADOL HYDROCHLORIDE 50 MG/1
TABLET ORAL
Qty: 120 TABLET | Refills: 3 | Status: SHIPPED | OUTPATIENT
Start: 2017-12-20 | End: 2018-05-15

## 2017-12-20 RX ORDER — GLIPIZIDE 10 MG/1
10 TABLET, FILM COATED, EXTENDED RELEASE ORAL
Qty: 30 TABLET | Refills: 5 | Status: SHIPPED | OUTPATIENT
Start: 2017-12-20 | End: 2018-06-18

## 2017-12-20 RX ORDER — B-COMPLEX WITH VITAMIN C
TABLET ORAL
Qty: 60 TABLET | Refills: 6 | Status: SHIPPED | OUTPATIENT
Start: 2017-12-20 | End: 2018-06-04

## 2017-12-20 RX ORDER — PREGABALIN 100 MG/1
100 CAPSULE ORAL 3 TIMES DAILY
Qty: 90 CAPSULE | Refills: 3 | Status: SHIPPED | OUTPATIENT
Start: 2017-12-20 | End: 2018-05-08

## 2017-12-20 RX ORDER — TOBRAMYCIN AND DEXAMETHASONE 3; 1 MG/ML; MG/ML
SUSPENSION/ DROPS OPHTHALMIC
COMMUNITY
Start: 2017-12-05 | End: 2018-06-04

## 2017-12-20 RX ORDER — CYCLOBENZAPRINE HCL 10 MG
10 TABLET ORAL NIGHTLY PRN
Qty: 30 TABLET | Refills: 3 | Status: SHIPPED | OUTPATIENT
Start: 2017-12-20 | End: 2018-01-09

## 2017-12-20 NOTE — PROGRESS NOTES
Jn Pedroza is a 66year old female who presents for a Medicare Annual Wellness visit.       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  Disp: 30 tablet Rfl: 6   allopurinol 300 MG Oral Tab TAKE ONE TABLET BY MOUTH EVERY MORNING Disp: 30 tablet Rfl: 6   ASPIR-LOW 81 MG Or Value Date   BUN 20 11/21/2017   BUN 22 (H) 10/20/2016   BUN 24 (H) 06/03/2016   CREATSERUM 0.86 11/21/2017   CREATSERUM 0.85 10/20/2016   CREATSERUM 1.01 06/03/2016       General Health     In the past six months, have you lost more than 10 pounds without you on multiple medications?: 1-Yes    Does pain affect your day to day activities?: 1-Yes     Have you had any memory issues?: 0-No    Fall/Risk Scorin          Depression Screening (PHQ-2/PHQ-9): Over the LAST 2 WEEKS   Little interest or pleasure in Glaucoma Screening      Ophthalmology Visit Annually 2 weeks ago    Bone Density Screening      Dexascan Every two years Last Dexa Scan:   XR DEXA BONE DENSITOMETRY (CPT=77080) 09/10/2013    No flowsheet data found.     Pap and Pelvic      Pap: Every 3 yr HgbA1C  Annually HGBA1C (%)   Date Value   06/24/2014 7.1 (H)     HgbA1C (%)   Date Value   11/21/2017 6.2 (H)    No flowsheet data found.     Creat/alb ratio  Annually      LDL  Annually LDL CHOLESTROL (no units)   Date Value   10/20/2016 26     LDL Choles 10   FLUoxetine HCl 40 MG Oral Cap Take 1 capsule (40 mg total) by mouth daily. Disp: 30 capsule Rfl: 3   Prazosin HCl 2 MG Oral Cap Take 2 capsules (4 mg total) by mouth nightly.  Disp: 60 capsule Rfl: 3   BusPIRone HCl 10 MG Oral Tab Take 2 tablets (20 mg Ophthalmic Suspension  Disp:  Rfl:       MEDICAL INFORMATION:   Past Medical History:   Diagnosis Date   • Acute osteomyelitis of right hand ([facility] [address]            GLOBAL EXP 9-30-16 / RIGHT THUMB / BAM  5/31/2016   • Anxiety    • Carrier or suspected carrier of denies nasal congestion, sinus pain or ST  LUNGS: denies shortness of breath with exertion  CARDIOVASCULAR: denies chest pain on exertion  GI: denies abdominal pain, denies heartburn  : denies dysuria   MUSCULOSKELETAL: chronic pain everywhere  NEURO: de Future    Hx of right BKA (HCC)    ABDIAZIZ on CPAP    Essential hypertension- controlled, cpm    Anxiety state and chronic depression- no SI/HI, on meds per Dr. Filippo Blandon but since he is retired, referred to Dr. Ronda Guidry    [facility] 09/18/2012, 10/04/2013, 09/09/2014, 09/26/2016   • Pneumococcal (Prevnar 13) 10/24/2016   • Pneumovax 23 12/12/2007, 12/20/2017   • TDAP 04/07/2016   • Zoster (Shingles) 02/29/2012

## 2018-01-05 RX ORDER — ERGOCALCIFEROL 1.25 MG/1
CAPSULE ORAL
Qty: 1 CAPSULE | Refills: 0 | Status: SHIPPED | OUTPATIENT
Start: 2018-01-05 | End: 2018-03-06

## 2018-01-08 RX ORDER — CLOTRIMAZOLE 1 %
CREAM (GRAM) TOPICAL
Qty: 60 G | Refills: 0 | Status: SHIPPED | OUTPATIENT
Start: 2018-01-08 | End: 2018-03-26

## 2018-01-09 NOTE — TELEPHONE ENCOUNTER
LOV: 12/20/17  Future office visit: 3/20/18  Last labs: 11/21/17 Cmp A1C   Last RX: 9/12/17 #60 g #3 Refills  Per protocol: Route to provider

## 2018-02-08 ENCOUNTER — TELEPHONE (OUTPATIENT)
Dept: INTERNAL MEDICINE CLINIC | Facility: CLINIC | Age: 73
End: 2018-02-08

## 2018-02-13 NOTE — TELEPHONE ENCOUNTER
Pharmacy called stated this is urgent because they have to have an answer before 1pm to be able to ship the medication to the pt. Please advise.

## 2018-02-13 NOTE — TELEPHONE ENCOUNTER
LOV: 12/20/17  Future office visit: 3/20/18  Last labs: 11/21/17 Cmp A1C  Last RX: Hydrocortisone 2/7/17 #28 G #10 Refills  Per protocol: Route to provider

## 2018-02-13 NOTE — TELEPHONE ENCOUNTER
[address] [facility] ([phone])  calling to FU On script for Hydrocortisone 2.5 %   TB is now PCP not Dr. Melinda Barnes.

## 2018-02-23 ENCOUNTER — TELEPHONE (OUTPATIENT)
Dept: INTERNAL MEDICINE CLINIC | Facility: CLINIC | Age: 73
End: 2018-02-23

## 2018-02-23 NOTE — TELEPHONE ENCOUNTER
Calling to inquired about the Form that was sent to us on 2/13/18. from [employer] called the [facility]\" Please advise checking on the status. She will be faxing the form to us once again.

## 2018-03-06 RX ORDER — ERGOCALCIFEROL 1.25 MG/1
CAPSULE ORAL
Qty: 1 CAPSULE | Refills: 0 | Status: SHIPPED | OUTPATIENT
Start: 2018-03-06 | End: 2018-04-13

## 2018-03-06 NOTE — TELEPHONE ENCOUNTER
Last Vit D completed was 3/2017 . Ok to resend a refill?     LOV: 12/20/17   Future office visit: 3/20/18   Last labs: Cmp A1C 11/21/17 3/23/17 Lipid Vit D   Last RX: Vit D 1/5/18 #1 Cap No Refills  Per protocol: Route to provider

## 2018-03-06 NOTE — TELEPHONE ENCOUNTER
Requested this rx via [employer]. Nothing on file.  Pt needs a refill on   ERGOCALCIFEROL 13157 units Oral Cap 1 capsule 0 1/5/2018    Sig :  TAKE ONE CAPSULE BY MOUTH MONTHLY       Please advise

## 2018-03-06 NOTE — TELEPHONE ENCOUNTER
Medication(s) to Refill:   Pending Prescriptions Disp Refills    ERGOCALCIFEROL 79301 units Oral Cap [Pharmacy Med Name: VITAMIN D GELCAP (50,000 UNIT] 1 capsule 0     Sig: TAKE ONE CAPSULE BY MOUTH MONTHLY             Reason for Medication Refill being se

## 2018-03-12 NOTE — TELEPHONE ENCOUNTER
Medication(s) to Refill:   Pending Prescriptions Disp Refills    HYDROCORTISONE 2.5 % External Cream [Pharmacy Med Name: HYDROCORTISONE 2.5% CREAM] 28 g 0     Sig: APPLY TO AFFECTED AREA 2 TIMES A DAY - USE TOGETHER WITH LOTRIMIN CREAM (CLOTRIMAZOLE)

## 2018-03-14 ENCOUNTER — TELEPHONE (OUTPATIENT)
Dept: INTERNAL MEDICINE CLINIC | Facility: CLINIC | Age: 73
End: 2018-03-14

## 2018-03-26 RX ORDER — LORATADINE 10 MG/1
TABLET ORAL
Qty: 30 TABLET | Refills: 0 | Status: SHIPPED | OUTPATIENT
Start: 2018-03-26 | End: 2018-06-04

## 2018-03-26 RX ORDER — ALLOPURINOL 300 MG/1
TABLET ORAL
Qty: 30 TABLET | Refills: 0 | Status: SHIPPED | OUTPATIENT
Start: 2018-03-26 | End: 2018-05-31

## 2018-03-26 RX ORDER — CLOTRIMAZOLE 1 %
CREAM (GRAM) TOPICAL
Qty: 60 G | Refills: 0 | Status: SHIPPED | OUTPATIENT
Start: 2018-03-26 | End: 2018-04-30

## 2018-03-26 RX ORDER — OMEPRAZOLE 20 MG/1
CAPSULE, DELAYED RELEASE ORAL
Qty: 30 CAPSULE | Refills: 0 | Status: SHIPPED | OUTPATIENT
Start: 2018-03-26 | End: 2018-05-21

## 2018-03-26 NOTE — TELEPHONE ENCOUNTER
Medication(s) to Refill:   Pending Prescriptions Disp Refills    OMEPRAZOLE 20 MG Oral Capsule Delayed Release [Pharmacy Med Name: OMEPRAZOLE DR 20 MG CAPSULE] 30 capsule 0     Sig: TAKE ONE CAPSULE BY MOUTH EVERY MORNING      ALLERGY RELIEF 10 MG Oral Tab

## 2018-03-26 NOTE — TELEPHONE ENCOUNTER
Medication(s) to Refill:   Pending Prescriptions Disp Refills    CLOTRIMAZOLE 1 % External Cream [Pharmacy Med Name: CLOTRIMAZOLE 1% CREAM] 60 g 0     Sig: APPLY TO AFFECTED AREA 2 TIMES A DAY - USE WITH HYDROCORTISONE CREAM             Reason for Jamil Neither

## 2018-03-27 ENCOUNTER — NURSE ONLY (OUTPATIENT)
Dept: LAB | Age: 73
End: 2018-03-27
Attending: INTERNAL MEDICINE
Payer: MEDICARE

## 2018-03-27 DIAGNOSIS — E11.42 CONTROLLED TYPE 2 DIABETES MELLITUS WITH DIABETIC POLYNEUROPATHY, WITHOUT LONG-TERM CURRENT USE OF INSULIN (HCC): ICD-10-CM

## 2018-03-27 DIAGNOSIS — E78.49 OTHER HYPERLIPIDEMIA: ICD-10-CM

## 2018-03-27 LAB
ALBUMIN SERPL-MCNC: 3.1 G/DL (ref 3.5–4.8)
ALP LIVER SERPL-CCNC: 123 U/L (ref 55–142)
ALT SERPL-CCNC: 15 U/L (ref 14–54)
AST SERPL-CCNC: 14 U/L (ref 15–41)
BILIRUB SERPL-MCNC: 0.4 MG/DL (ref 0.1–2)
BUN BLD-MCNC: 19 MG/DL (ref 8–20)
CALCIUM BLD-MCNC: 8.6 MG/DL (ref 8.3–10.3)
CHLORIDE: 107 MMOL/L (ref 101–111)
CHOLEST SMN-MCNC: 119 MG/DL (ref ?–200)
CO2: 30 MMOL/L (ref 22–32)
CREAT BLD-MCNC: 0.75 MG/DL (ref 0.55–1.02)
CREAT UR-SCNC: 116 MG/DL
EST. AVERAGE GLUCOSE BLD GHB EST-MCNC: 134 MG/DL (ref 68–126)
GLUCOSE BLD-MCNC: 101 MG/DL (ref 70–99)
HBA1C MFR BLD HPLC: 6.3 % (ref ?–5.7)
HDLC SERPL-MCNC: 42 MG/DL (ref 45–?)
HDLC SERPL: 2.83 {RATIO} (ref ?–4.44)
LDLC SERPL CALC-MCNC: 35 MG/DL (ref ?–130)
M PROTEIN MFR SERPL ELPH: 6.7 G/DL (ref 6.1–8.3)
MICROALBUMIN UR-MCNC: 5.4 MG/DL
MICROALBUMIN/CREAT 24H UR-RTO: 46.6 UG/MG (ref ?–30)
NONHDLC SERPL-MCNC: 77 MG/DL (ref ?–130)
POTASSIUM SERPL-SCNC: 4.2 MMOL/L (ref 3.6–5.1)
SODIUM SERPL-SCNC: 141 MMOL/L (ref 136–144)
TRIGL SERPL-MCNC: 209 MG/DL (ref ?–150)
TSI SER-ACNC: 1.49 MIU/ML (ref 0.35–5.5)
VLDLC SERPL CALC-MCNC: 42 MG/DL (ref 5–40)

## 2018-03-27 PROCEDURE — 82043 UR ALBUMIN QUANTITATIVE: CPT

## 2018-03-27 PROCEDURE — 36415 COLL VENOUS BLD VENIPUNCTURE: CPT

## 2018-03-27 PROCEDURE — 83036 HEMOGLOBIN GLYCOSYLATED A1C: CPT

## 2018-03-27 PROCEDURE — 84443 ASSAY THYROID STIM HORMONE: CPT

## 2018-03-27 PROCEDURE — 80061 LIPID PANEL: CPT

## 2018-03-27 PROCEDURE — 80053 COMPREHEN METABOLIC PANEL: CPT

## 2018-03-27 PROCEDURE — 82570 ASSAY OF URINE CREATININE: CPT

## 2018-04-02 RX ORDER — ERGOCALCIFEROL 1.25 MG/1
CAPSULE ORAL
Qty: 1 CAPSULE | Refills: 5 | Status: SHIPPED | OUTPATIENT
Start: 2018-04-02 | End: 2018-06-04

## 2018-04-02 NOTE — TELEPHONE ENCOUNTER
LOV: 12/20/17 w/ TB for MWV  FOV: 4/6/18  Last labs: 3/27/18 Microalbumin,TSH,LIPID,CMP,A1C  Last Refill: 3/6/18 qt:1    Per protocol sent to provider

## 2018-04-03 RX ORDER — ERGOCALCIFEROL 1.25 MG/1
CAPSULE ORAL
Qty: 1 CAPSULE | Refills: 10 | OUTPATIENT
Start: 2018-04-03

## 2018-04-03 RX ORDER — ACETAMINOPHEN 500 MG
TABLET ORAL
Qty: 180 TABLET | Refills: 0 | OUTPATIENT
Start: 2018-04-03

## 2018-04-06 RX ORDER — ACETAMINOPHEN 500 MG
TABLET ORAL
Qty: 180 TABLET | Refills: 0 | OUTPATIENT
Start: 2018-04-06

## 2018-04-09 RX ORDER — ACETAMINOPHEN 500 MG
TABLET ORAL
Qty: 180 TABLET | Refills: 0 | Status: SHIPPED | OUTPATIENT
Start: 2018-04-09 | End: 2018-06-04

## 2018-04-09 RX ORDER — ACETAMINOPHEN 500 MG
TABLET ORAL
Qty: 180 TABLET | Refills: 0 | OUTPATIENT
Start: 2018-04-09

## 2018-04-09 RX ORDER — ACETAMINOPHEN 500 MG
1000 TABLET ORAL 3 TIMES DAILY PRN
Refills: 0 | OUTPATIENT
Start: 2018-04-09

## 2018-04-09 NOTE — TELEPHONE ENCOUNTER
LOV: 12/20/17 w/ TB for MWV  FOV: 4/13/18  Last labs: 3/27/18 TSH,LIPID,CMP,A1C,Microalbumin  Last Refill: Lyrica 12/20/17 qt:90 3 refills (too soon to fill denied)  Hydrocortisone 3/12/18 qt:28 g    Per protocol sent to provider

## 2018-04-09 NOTE — TELEPHONE ENCOUNTER
Pharm calling to let us know pt needs new rx for   Acetaminophen 500 MG TAKE 1 OR 2 TABLETS 3 TIMES A DAY   Sent to them-was getting it from Dr. Debbie Sahni before

## 2018-04-10 NOTE — TELEPHONE ENCOUNTER
Michelle Burrell MD   [facility] 19 hours ago (7:06 PM)     Can get otc (Routing comment)            Tried calling patient no VM set up, unable to leave message. Will try calling again later.

## 2018-04-13 ENCOUNTER — OFFICE VISIT (OUTPATIENT)
Dept: INTERNAL MEDICINE CLINIC | Facility: CLINIC | Age: 73
End: 2018-04-13

## 2018-04-13 VITALS
DIASTOLIC BLOOD PRESSURE: 70 MMHG | WEIGHT: 293 LBS | BODY MASS INDEX: 47 KG/M2 | HEART RATE: 72 BPM | SYSTOLIC BLOOD PRESSURE: 126 MMHG | TEMPERATURE: 98 F | RESPIRATION RATE: 16 BRPM

## 2018-04-13 DIAGNOSIS — Z99.89 OSA ON CPAP: ICD-10-CM

## 2018-04-13 DIAGNOSIS — I10 ESSENTIAL HYPERTENSION: ICD-10-CM

## 2018-04-13 DIAGNOSIS — Z89.511 HX OF RIGHT BKA (HCC): ICD-10-CM

## 2018-04-13 DIAGNOSIS — G47.33 OSA ON CPAP: ICD-10-CM

## 2018-04-13 DIAGNOSIS — G89.4 CHRONIC PAIN SYNDROME: ICD-10-CM

## 2018-04-13 DIAGNOSIS — E78.5 DYSLIPIDEMIA: ICD-10-CM

## 2018-04-13 DIAGNOSIS — E11.42 CONTROLLED TYPE 2 DIABETES MELLITUS WITH DIABETIC POLYNEUROPATHY, WITHOUT LONG-TERM CURRENT USE OF INSULIN (HCC): Primary | ICD-10-CM

## 2018-04-13 PROCEDURE — 99214 OFFICE O/P EST MOD 30 MIN: CPT | Performed by: INTERNAL MEDICINE

## 2018-04-13 RX ORDER — TRAZODONE HYDROCHLORIDE 50 MG/1
25 TABLET ORAL NIGHTLY
COMMUNITY
Start: 2018-03-30 | End: 2019-12-10

## 2018-04-13 NOTE — PROGRESS NOTES
Beti Manriquez is a 74year old female. Patient presents with: Follow - Up: routine.  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,  chronic pain from this along with OA and phantom pains (BKA RLE), HTN, H APPLY TO AFFECTED AREA 2 TIMES A DAY - USE WITH HYDROCORTISONE CREAM Disp: 60 g Rfl: 0   tobramycin-dexamethasone 0.3-0.1 % Ophthalmic Suspension  Disp:  Rfl:    pregabalin (LYRICA) 100 MG Oral Cap Take 1 capsule (100 mg total) by mouth 3 (three) times umm tablet Rfl: 11      Past Medical History:   Diagnosis Date   • Acute osteomyelitis of right hand ([address]            GLOBAL EXP 9-30-16 / RIGHT THUMB / BAM  5/31/2016   • Anxiety    • 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  No   BMI 46.98 kg/m²   GENERAL: obese, wheelchair bound  HEENT: atraumatic, normocephalic  NECK: supple,no adenopathy  LUNGS: normal rate without respiratory distress, lungs clear to

## 2018-04-16 RX ORDER — ASPIRIN 81 MG/1
TABLET ORAL
Qty: 30 TABLET | Refills: 0 | OUTPATIENT
Start: 2018-04-16

## 2018-04-17 ENCOUNTER — TELEPHONE (OUTPATIENT)
Dept: INTERNAL MEDICINE CLINIC | Facility: CLINIC | Age: 73
End: 2018-04-17

## 2018-04-17 NOTE — TELEPHONE ENCOUNTER
Pharmacy states the Acetaminophen has been covered by insurance and was dispensed on 4/9/18. Pt picked up 30 day supply.

## 2018-04-18 RX ORDER — ASPIRIN 81 MG/1
TABLET ORAL
Qty: 30 TABLET | Refills: 11 | Status: SHIPPED | OUTPATIENT
Start: 2018-04-18 | End: 2018-06-04

## 2018-04-18 NOTE — TELEPHONE ENCOUNTER
LOV: 4/13/18 TB  Future office visit: No upcoming visit   Last labs: 3/27/18 Micro Tsh Lipid Cmp A1C   Last RX: ASA 3/8/17 #30 #11 Refill  Per protocol: Route to provider

## 2018-04-18 NOTE — TELEPHONE ENCOUNTER
[facility] is calling about this request. It was sent to the wrong [facility] office.  Please advise

## 2018-04-30 RX ORDER — CLOTRIMAZOLE 1 %
CREAM (GRAM) TOPICAL
Qty: 60 G | Refills: 0 | Status: SHIPPED | OUTPATIENT
Start: 2018-04-30 | End: 2018-06-01

## 2018-04-30 NOTE — TELEPHONE ENCOUNTER
LOV: 4/13/18 w/ TB  FOV: None   Last labs: 3/27/18  Last Refill: 3/26/18 qt:60 g  Per protocol sent to provider

## 2018-05-05 NOTE — TELEPHONE ENCOUNTER
LOV: 04/13/2018  Future Visit: [address] appointment on 4/6/18, no future appointments scheduled   Last Labs: 03/27/2018 Hemoglobin A1C, COMP, Lipid, TSH, Microalb   Last Rx: 04/09/2018    Per protocol

## 2018-05-08 ENCOUNTER — TELEPHONE (OUTPATIENT)
Dept: INTERNAL MEDICINE CLINIC | Facility: CLINIC | Age: 73
End: 2018-05-08

## 2018-05-08 RX ORDER — PREGABALIN 100 MG/1
100 CAPSULE ORAL 3 TIMES DAILY
Qty: 90 CAPSULE | Refills: 0 | Status: SHIPPED | OUTPATIENT
Start: 2018-05-08 | End: 2018-06-18

## 2018-05-08 RX ORDER — ACETAMINOPHEN 500 MG
TABLET ORAL
Qty: 180 TABLET | Refills: 10 | Status: SHIPPED | OUTPATIENT
Start: 2018-05-08 | End: 2018-06-04

## 2018-05-08 NOTE — TELEPHONE ENCOUNTER
LOV: 04/13/2018  Future Visit: No appointment scheduled   Last Labs: 03/27/2018 Microalb, TSH, Lipid, COMP, Hemoglobin A1C   Last Rx: 04/09/2018     Per protocol

## 2018-05-08 NOTE — TELEPHONE ENCOUNTER
Consultation note for diabetic footwear from [facility] foot physicians was placed on TB bin for signature.

## 2018-05-08 NOTE — TELEPHONE ENCOUNTER
LOV: 04/13/2018  Future Visit: No appointment scheduled   Last Labs: 03/27/2018, Microalb, TSH, Lipid panel, COMP, Hemoglobin A1C   Last Rx: 12/20/2017 (90 cap with 3 refills)     Per protocol: RX needs to be printed

## 2018-05-09 NOTE — TELEPHONE ENCOUNTER
Pharmacy called to check the status of this refill. She states the patient is out of this medication. Please advise.

## 2018-05-15 RX ORDER — PRAVASTATIN SODIUM 20 MG
TABLET ORAL
Qty: 90 TABLET | Refills: 3 | Status: SHIPPED | OUTPATIENT
Start: 2018-05-15 | End: 2018-06-04

## 2018-05-15 RX ORDER — TRAMADOL HYDROCHLORIDE 50 MG/1
TABLET ORAL
Qty: 120 TABLET | Refills: 0 | Status: SHIPPED | OUTPATIENT
Start: 2018-05-15 | End: 2018-06-04

## 2018-05-15 NOTE — TELEPHONE ENCOUNTER
LOV: 4/13/18   Future office visit: No upcoming visit   Last labs: 3/27/18 Micro Tsh Lipid Cmp A1C  Last RX: Tramadol 12/20/17 #120 #3 Refill  Per protocol: Route to provider

## 2018-05-21 NOTE — TELEPHONE ENCOUNTER
LOV: 4/13/18 w/ TB  FOV: None  Last labs: 3/27/18  Last Refill: Cyclobenzaprine 12/20/17 qt:30 3 refills  Omeprazole 3/26/18  qt:30     Per protocol sent to provider

## 2018-05-22 RX ORDER — OMEPRAZOLE 20 MG/1
CAPSULE, DELAYED RELEASE ORAL
Qty: 30 CAPSULE | Refills: 2 | Status: SHIPPED | OUTPATIENT
Start: 2018-05-22 | End: 2018-06-04

## 2018-05-22 RX ORDER — CYCLOBENZAPRINE HCL 10 MG
TABLET ORAL
Qty: 30 TABLET | Refills: 2 | Status: SHIPPED | OUTPATIENT
Start: 2018-05-22 | End: 2018-06-04

## 2018-05-24 ENCOUNTER — PATIENT OUTREACH (OUTPATIENT)
Dept: CASE MANAGEMENT | Age: 73
End: 2018-05-24

## 2018-05-31 RX ORDER — ALLOPURINOL 300 MG/1
TABLET ORAL
Qty: 90 TABLET | Refills: 1 | Status: SHIPPED | OUTPATIENT
Start: 2018-05-31 | End: 2018-06-04

## 2018-05-31 NOTE — TELEPHONE ENCOUNTER
LOV: 4/13/18 w/ TB  FOV: None  Last labs: 3/27/18  Last Refill: Hydrocortisone 5/5/18 qt:28 g  3/26/18 qt:30    Per protocol sent to provider

## 2018-06-03 RX ORDER — CLOTRIMAZOLE 1 %
CREAM (GRAM) TOPICAL
Qty: 60 G | Refills: 0 | Status: SHIPPED | OUTPATIENT
Start: 2018-06-03 | End: 2018-06-04

## 2018-06-04 ENCOUNTER — HOSPITAL ENCOUNTER (INPATIENT)
Facility: HOSPITAL | Age: 73
LOS: 2 days | Discharge: HOME HEALTH | DRG: 871 | End: 2018-06-06
Attending: EMERGENCY MEDICINE | Admitting: HOSPITALIST
Payer: MEDICARE

## 2018-06-04 ENCOUNTER — APPOINTMENT (OUTPATIENT)
Dept: CT IMAGING | Facility: HOSPITAL | Age: 73
DRG: 871 | End: 2018-06-04
Attending: EMERGENCY MEDICINE
Payer: MEDICARE

## 2018-06-04 ENCOUNTER — APPOINTMENT (OUTPATIENT)
Dept: GENERAL RADIOLOGY | Facility: HOSPITAL | Age: 73
DRG: 871 | End: 2018-06-04
Attending: EMERGENCY MEDICINE
Payer: MEDICARE

## 2018-06-04 DIAGNOSIS — R77.8 ELEVATED TROPONIN: ICD-10-CM

## 2018-06-04 DIAGNOSIS — J18.9 COMMUNITY ACQUIRED PNEUMONIA OF LEFT LOWER LOBE OF LUNG: Primary | ICD-10-CM

## 2018-06-04 PROBLEM — R73.9 HYPERGLYCEMIA: Status: ACTIVE | Noted: 2018-06-04

## 2018-06-04 PROBLEM — R79.89 AZOTEMIA: Status: ACTIVE | Noted: 2018-06-04

## 2018-06-04 PROBLEM — D72.829 LEUKOCYTOSIS: Status: ACTIVE | Noted: 2018-06-04

## 2018-06-04 PROCEDURE — 71045 X-RAY EXAM CHEST 1 VIEW: CPT | Performed by: EMERGENCY MEDICINE

## 2018-06-04 PROCEDURE — 5A09357 ASSISTANCE WITH RESPIRATORY VENTILATION, LESS THAN 24 CONSECUTIVE HOURS, CONTINUOUS POSITIVE AIRWAY PRESSURE: ICD-10-PCS | Performed by: HOSPITALIST

## 2018-06-04 PROCEDURE — 71275 CT ANGIOGRAPHY CHEST: CPT | Performed by: EMERGENCY MEDICINE

## 2018-06-04 PROCEDURE — 99223 1ST HOSP IP/OBS HIGH 75: CPT | Performed by: HOSPITALIST

## 2018-06-04 RX ORDER — ENOXAPARIN SODIUM 100 MG/ML
0.5 INJECTION SUBCUTANEOUS NIGHTLY
Status: DISCONTINUED | OUTPATIENT
Start: 2018-06-04 | End: 2018-06-06

## 2018-06-04 RX ORDER — FLUOXETINE 20 MG/1
40 TABLET, FILM COATED ORAL EVERY EVENING
COMMUNITY
End: 2019-07-08

## 2018-06-04 RX ORDER — PRAZOSIN HYDROCHLORIDE 1 MG/1
5 CAPSULE ORAL NIGHTLY
Status: DISCONTINUED | OUTPATIENT
Start: 2018-06-04 | End: 2018-06-06

## 2018-06-04 RX ORDER — ALLOPURINOL 300 MG/1
300 TABLET ORAL DAILY
Status: DISCONTINUED | OUTPATIENT
Start: 2018-06-04 | End: 2018-06-06

## 2018-06-04 RX ORDER — HYDROCODONE BITARTRATE AND ACETAMINOPHEN 5; 325 MG/1; MG/1
1 TABLET ORAL ONCE
Status: COMPLETED | OUTPATIENT
Start: 2018-06-04 | End: 2018-06-04

## 2018-06-04 RX ORDER — TRAMADOL HYDROCHLORIDE 50 MG/1
50 TABLET ORAL EVERY 8 HOURS PRN
COMMUNITY
End: 2018-07-30

## 2018-06-04 RX ORDER — FLUOXETINE HYDROCHLORIDE 40 MG/1
60 CAPSULE ORAL DAILY
COMMUNITY
End: 2019-06-17 | Stop reason: CLARIF

## 2018-06-04 RX ORDER — ASPIRIN 81 MG/1
324 TABLET, CHEWABLE ORAL ONCE
Status: COMPLETED | OUTPATIENT
Start: 2018-06-04 | End: 2018-06-04

## 2018-06-04 RX ORDER — METOPROLOL SUCCINATE 25 MG/1
25 TABLET, EXTENDED RELEASE ORAL
Status: DISCONTINUED | OUTPATIENT
Start: 2018-06-04 | End: 2018-06-06

## 2018-06-04 RX ORDER — NITROGLYCERIN 0.4 MG/1
0.4 TABLET SUBLINGUAL EVERY 5 MIN PRN
Status: DISCONTINUED | OUTPATIENT
Start: 2018-06-04 | End: 2018-06-06

## 2018-06-04 RX ORDER — ASPIRIN 325 MG
325 TABLET ORAL DAILY
Status: DISCONTINUED | OUTPATIENT
Start: 2018-06-04 | End: 2018-06-05

## 2018-06-04 RX ORDER — LOSARTAN POTASSIUM 50 MG/1
50 TABLET ORAL DAILY
Status: DISCONTINUED | OUTPATIENT
Start: 2018-06-04 | End: 2018-06-04

## 2018-06-04 RX ORDER — FUROSEMIDE 10 MG/ML
40 INJECTION INTRAMUSCULAR; INTRAVENOUS ONCE
Status: COMPLETED | OUTPATIENT
Start: 2018-06-04 | End: 2018-06-04

## 2018-06-04 RX ORDER — PREGABALIN 100 MG/1
100 CAPSULE ORAL 3 TIMES DAILY
Status: DISCONTINUED | OUTPATIENT
Start: 2018-06-04 | End: 2018-06-06

## 2018-06-04 RX ORDER — ENOXAPARIN SODIUM 100 MG/ML
40 INJECTION SUBCUTANEOUS DAILY
Status: DISCONTINUED | OUTPATIENT
Start: 2018-06-04 | End: 2018-06-04

## 2018-06-04 RX ORDER — ALLOPURINOL 300 MG/1
300 TABLET ORAL DAILY
COMMUNITY
End: 2018-10-29

## 2018-06-04 RX ORDER — SODIUM CHLORIDE 9 MG/ML
INJECTION, SOLUTION INTRAVENOUS CONTINUOUS
Status: DISCONTINUED | OUTPATIENT
Start: 2018-06-04 | End: 2018-06-05

## 2018-06-04 RX ORDER — PRAZOSIN HYDROCHLORIDE 5 MG/1
5 CAPSULE ORAL NIGHTLY
COMMUNITY

## 2018-06-04 RX ORDER — ASPIRIN 81 MG/1
81 TABLET ORAL DAILY
COMMUNITY
End: 2019-03-11

## 2018-06-04 RX ORDER — IPRATROPIUM BROMIDE AND ALBUTEROL SULFATE 2.5; .5 MG/3ML; MG/3ML
3 SOLUTION RESPIRATORY (INHALATION)
Status: DISCONTINUED | OUTPATIENT
Start: 2018-06-04 | End: 2018-06-06

## 2018-06-04 RX ORDER — PANTOPRAZOLE SODIUM 20 MG/1
20 TABLET, DELAYED RELEASE ORAL
Status: DISCONTINUED | OUTPATIENT
Start: 2018-06-05 | End: 2018-06-06

## 2018-06-04 RX ORDER — BUSPIRONE HYDROCHLORIDE 10 MG/1
20 TABLET ORAL 2 TIMES DAILY
COMMUNITY

## 2018-06-04 RX ORDER — BENZONATATE 200 MG/1
200 CAPSULE ORAL 3 TIMES DAILY PRN
Status: DISCONTINUED | OUTPATIENT
Start: 2018-06-04 | End: 2018-06-06

## 2018-06-04 RX ORDER — B-COMPLEX WITH VITAMIN C
1 TABLET ORAL 2 TIMES DAILY
COMMUNITY
End: 2019-09-17

## 2018-06-04 RX ORDER — ACETAMINOPHEN AND CODEINE PHOSPHATE 300; 30 MG/1; MG/1
1 TABLET ORAL EVERY 8 HOURS PRN
Status: DISCONTINUED | OUTPATIENT
Start: 2018-06-04 | End: 2018-06-06

## 2018-06-04 RX ORDER — PRAVASTATIN SODIUM 20 MG
20 TABLET ORAL NIGHTLY
Status: DISCONTINUED | OUTPATIENT
Start: 2018-06-04 | End: 2018-06-06

## 2018-06-04 RX ORDER — FLUOXETINE HYDROCHLORIDE 20 MG/1
60 CAPSULE ORAL DAILY
Status: DISCONTINUED | OUTPATIENT
Start: 2018-06-04 | End: 2018-06-04

## 2018-06-04 RX ORDER — FLUOXETINE 10 MG/1
60 TABLET, FILM COATED ORAL DAILY
Status: DISCONTINUED | OUTPATIENT
Start: 2018-06-04 | End: 2018-06-06

## 2018-06-04 RX ORDER — CYCLOBENZAPRINE HCL 10 MG
10 TABLET ORAL NIGHTLY PRN
COMMUNITY
End: 2019-12-10

## 2018-06-04 RX ORDER — PRAVASTATIN SODIUM 20 MG
20 TABLET ORAL NIGHTLY
COMMUNITY
End: 2019-04-11

## 2018-06-04 RX ORDER — DEXTROSE MONOHYDRATE 25 G/50ML
50 INJECTION, SOLUTION INTRAVENOUS
Status: DISCONTINUED | OUTPATIENT
Start: 2018-06-04 | End: 2018-06-06

## 2018-06-04 RX ORDER — LORATADINE 10 MG/1
10 TABLET ORAL DAILY
COMMUNITY
End: 2019-03-18

## 2018-06-04 RX ORDER — IPRATROPIUM BROMIDE AND ALBUTEROL SULFATE 2.5; .5 MG/3ML; MG/3ML
3 SOLUTION RESPIRATORY (INHALATION) ONCE
Status: COMPLETED | OUTPATIENT
Start: 2018-06-04 | End: 2018-06-04

## 2018-06-04 RX ORDER — TRAMADOL HYDROCHLORIDE 50 MG/1
50 TABLET ORAL EVERY 8 HOURS PRN
Status: DISCONTINUED | OUTPATIENT
Start: 2018-06-04 | End: 2018-06-06

## 2018-06-04 RX ORDER — BUSPIRONE HYDROCHLORIDE 5 MG/1
20 TABLET ORAL 2 TIMES DAILY
Status: DISCONTINUED | OUTPATIENT
Start: 2018-06-04 | End: 2018-06-06

## 2018-06-04 RX ORDER — CLOTRIMAZOLE 1 %
CREAM (GRAM) TOPICAL 2 TIMES DAILY
COMMUNITY
End: 2018-07-05

## 2018-06-04 RX ORDER — IPRATROPIUM BROMIDE AND ALBUTEROL SULFATE 2.5; .5 MG/3ML; MG/3ML
3 SOLUTION RESPIRATORY (INHALATION) EVERY 4 HOURS PRN
Status: DISCONTINUED | OUTPATIENT
Start: 2018-06-04 | End: 2018-06-06

## 2018-06-04 RX ORDER — OMEPRAZOLE 20 MG/1
20 CAPSULE, DELAYED RELEASE ORAL
COMMUNITY
End: 2018-07-30

## 2018-06-04 RX ORDER — LOSARTAN POTASSIUM 50 MG/1
50 TABLET ORAL DAILY
COMMUNITY
End: 2018-06-18

## 2018-06-04 RX ORDER — ASPIRIN 325 MG
325 TABLET ORAL DAILY
Status: DISCONTINUED | OUTPATIENT
Start: 2018-06-04 | End: 2018-06-04

## 2018-06-04 RX ORDER — ASPIRIN 81 MG/1
81 TABLET ORAL DAILY
Status: DISCONTINUED | OUTPATIENT
Start: 2018-06-05 | End: 2018-06-06

## 2018-06-04 RX ORDER — TRAZODONE HYDROCHLORIDE 50 MG/1
50 TABLET ORAL NIGHTLY
Status: DISCONTINUED | OUTPATIENT
Start: 2018-06-04 | End: 2018-06-06

## 2018-06-04 RX ORDER — ERGOCALCIFEROL 1.25 MG/1
50000 CAPSULE ORAL
COMMUNITY
End: 2018-08-14

## 2018-06-04 NOTE — CONSULTS
[facility] Cardiology Consultation    Noah Arzate Patient Status:  Emergency    1945 MRN [id number]   Location [address] [facility] Attending Landy Morris MD   Hosp Day # 0 PCP Liane Martel MD     Reason for Consultation:  Ana Lynn Grandfather    • cadiac arrest[other] [OTHER] Paternal Grandmother    • cardiac arrest[other] [OTHER] Paternal Grandfather    • cardiac arrest[other] [OTHER] Father    • Cancer Father    • Arthritis Father    • Heart Disease Father    • pneumonia[other] [O 1.13*   CA  8.8   GLU  134*       Recent Labs   Lab  06/04/18   1307   ALT  14   AST  18   ALB  2.1*       No results for input(s): PTP, INR in the last 168 hours. Recent Labs   Lab  06/04/18   1307   TROP  0.169*         Impression:   1.  Elevated tropo

## 2018-06-04 NOTE — ED PROVIDER NOTES
Patient Seen in: [facility] Emergency Department    History   Patient presents with:  Dizziness (neurologic)    Stated Complaint:     HPI    This is a 77-year-old female complaining of dizziness.   This patient has a previous right below the knee amp use: No                Review of Systems    Positive for stated complaint:   Other systems are as noted in HPI. Constitutional and vital signs reviewed. All other systems reviewed and negative except as noted above.     Physical Exam   ED Triage Vital the following:     Pro-Beta Natriuretic Peptide 4,969 (*)     All other components within normal limits   MANUAL DIFFERENTIAL - Abnormal; Notable for the following:     Neutrophil Absolute Manual 22.35 (*)     Lymphocyte Absolute Manual 0.76 (*)     Monocy PATIENT STATED HISTORY:(As transcribed by Technologist)    CONTRAST USED:   FINDINGS:  VASCULATURE:  Main pulmonary artery densities 240 Hounsfield units. There is mildly delayed opacification of the pulmonary artery.   There is adequate evaluation to the identified. Favor small left pleural effusion. A background of mild volume overload is likely present. Superimposed pneumonia cannot be excluded. CONCLUSION:  Mild to moderate cardiomegaly. Mild volume overload is present.   Possible left lower lob

## 2018-06-04 NOTE — PROGRESS NOTES
[facility] Pharmacy Note: Antimicrobial Weight Dose Adjustment for: ceftriaxone (ROCEPHIN)    Cindy Mills is a 74year old female who has been prescribed ceftriaxone (ROCEPHIN) 1 gm x  1 dose.   CrCl is CrCl cannot be calculated (Patient's most recent lab resu

## 2018-06-05 ENCOUNTER — APPOINTMENT (OUTPATIENT)
Dept: GENERAL RADIOLOGY | Facility: HOSPITAL | Age: 73
DRG: 871 | End: 2018-06-05
Attending: HOSPITALIST
Payer: MEDICARE

## 2018-06-05 ENCOUNTER — APPOINTMENT (OUTPATIENT)
Dept: CV DIAGNOSTICS | Facility: HOSPITAL | Age: 73
DRG: 871 | End: 2018-06-05
Attending: INTERNAL MEDICINE
Payer: MEDICARE

## 2018-06-05 PROCEDURE — 93306 TTE W/DOPPLER COMPLETE: CPT | Performed by: INTERNAL MEDICINE

## 2018-06-05 PROCEDURE — 99232 SBSQ HOSP IP/OBS MODERATE 35: CPT | Performed by: HOSPITALIST

## 2018-06-05 RX ORDER — POTASSIUM CHLORIDE 20 MEQ/1
40 TABLET, EXTENDED RELEASE ORAL EVERY 4 HOURS
Status: COMPLETED | OUTPATIENT
Start: 2018-06-05 | End: 2018-06-05

## 2018-06-05 NOTE — PHYSICAL THERAPY NOTE
Attempted to see patient for PT evaluation. Per RN, pt is about to have ECHO. Will follow-up as appropriate later this date.

## 2018-06-05 NOTE — PROGRESS NOTES
[facility]ITAL[facility]  Progress Note     Joleen Ibanez Patient Status:  Inpatient    1945 MRN [id number]   [facility] 8NE-A Attending Epi Santana MD   Hosp Day # 1 PCP Bharath Lopez MD     Chief Complaint: PNA    S: Patient feels allopurinol  300 mg Oral Daily   • BusPIRone HCl  20 mg Oral BID   • FLUoxetine HCl  60 mg Oral Daily   • mupirocin   Topical TID   • Mirabegron ER  50 mg Oral Daily   • Pravastatin Sodium  20 mg Oral Nightly   • Prazosin HCl  5 mg Oral Nightly   • Pantopr

## 2018-06-05 NOTE — PROGRESS NOTES
[facility] Cardiology Progress Note        Elester Post Patient Status:  Inpatient    1945 MRN [id number]   [facility] 8NE-A Attending Cricket Ruiz MD   Hosp Day # 1 PCP Lauren Santamaria MD     Subjective:  R (36.8 °C)  Pulse:  [] 75  Resp:  [18-27] 20  BP: (101-150)/() 146/71    General: No apparent distress  HEENT: No focal deficits. Neck: supple. JVP normal  Cardiac: Regular rhythm, S1, S2 normal,  rub or gallop. No murmur.   Lungs: CTA  Abdomen

## 2018-06-05 NOTE — RESPIRATORY THERAPY NOTE
ABDIAZIZ - Equipment Use Daily Summary:                  . Set Mode: AUTO CPAP WITH C-FLEX                . Usage in hours:8:42                . 90% Pressure (EPAP) level: 8.0                . 90% Insp. Pressure (IPAP): Genaro Bonilla AHI: 0.9                .

## 2018-06-05 NOTE — PROGRESS NOTES
[facility] Progress Note:  Anticoagulation Weight Dose Adjustment for enoxaparin (Antonella Morris)    Grzegorz Eaton is a 74year old female who has been prescribed enoxaparin (LOVENOX) 40 mg subcutaneous nightly for VTE prophylaxis.       Estimated Creatinine C

## 2018-06-05 NOTE — PHYSICAL THERAPY NOTE
PHYSICAL THERAPY EVALUATION - INPATIENT     Room Number: 9687/8786-U  Evaluation Date: 6/5/2018  Type of Evaluation: Initial  Physician Order: PT Eval and Treat    Presenting Problem: pneumonia  Reason for Therapy: Mobility Dysfunction and Discharge CHOLECYSTECTOMY  No date: TONSILLECTOMY    HOME SITUATION  Type of Home: Other (Comment) (Supported Living Facility [facility]))                    Lives With: Staff 24 hours  Drives: No  Patient Owned Equipment: Other (Comment) (manual wheelchair) Climbing 3-5 steps with a railing?: Total       AM-PAC Score:  Raw Score: 13   PT Approx Degree of Impairment Score: 64.91%   Standardized Score (AM-PAC Scale): 36.74   CMS Modifier (G-Code): CL    FUNCTIONAL ABILITY STATUS  Gait Assessment   Gait Assist deficits to assist patient in returning to prior to level of function. Recommend home health PT upon discharge in order to continue to progress towards baseline modified independence.    DISCHARGE RECOMMENDATIONS  PT Discharge Recommendations: Home with ho

## 2018-06-05 NOTE — HOME CARE LIAISON
REFERRAL RECEIVED FROM RENATO HUMPHREY. MET WITH PATIENT AT BEDSIDE. PATIENT HAS HISTORY OF [facility]. PATIENT WOULD LIKE TO THINK ABOUT IF SHE WOULD WANT [facility]. BROCHURE AND PHONE NUMBER PROVIDED TO PATIENT.  WILL FOLLOW UP WITH JOE

## 2018-06-06 ENCOUNTER — APPOINTMENT (OUTPATIENT)
Dept: CV DIAGNOSTICS | Facility: HOSPITAL | Age: 73
DRG: 871 | End: 2018-06-06
Attending: INTERNAL MEDICINE
Payer: MEDICARE

## 2018-06-06 VITALS
WEIGHT: 293 LBS | OXYGEN SATURATION: 91 % | HEART RATE: 87 BPM | BODY MASS INDEX: 43.4 KG/M2 | DIASTOLIC BLOOD PRESSURE: 84 MMHG | HEIGHT: 69 IN | RESPIRATION RATE: 18 BRPM | TEMPERATURE: 98 F | SYSTOLIC BLOOD PRESSURE: 141 MMHG

## 2018-06-06 PROCEDURE — 78452 HT MUSCLE IMAGE SPECT MULT: CPT | Performed by: INTERNAL MEDICINE

## 2018-06-06 PROCEDURE — 93018 CV STRESS TEST I&R ONLY: CPT | Performed by: INTERNAL MEDICINE

## 2018-06-06 PROCEDURE — 99239 HOSP IP/OBS DSCHRG MGMT >30: CPT | Performed by: HOSPITALIST

## 2018-06-06 PROCEDURE — 93017 CV STRESS TEST TRACING ONLY: CPT | Performed by: INTERNAL MEDICINE

## 2018-06-06 RX ORDER — AMOXICILLIN AND CLAVULANATE POTASSIUM 875; 125 MG/1; MG/1
1 TABLET, FILM COATED ORAL 2 TIMES DAILY
Qty: 16 TABLET | Refills: 0 | Status: SHIPPED | OUTPATIENT
Start: 2018-06-06 | End: 2018-06-13

## 2018-06-06 RX ORDER — ALBUTEROL SULFATE 90 UG/1
2 AEROSOL, METERED RESPIRATORY (INHALATION) EVERY 4 HOURS PRN
Qty: 1 INHALER | Refills: 0 | Status: SHIPPED | OUTPATIENT
Start: 2018-06-06 | End: 2019-12-10

## 2018-06-06 RX ORDER — AMINOPHYLLINE DIHYDRATE 25 MG/ML
INJECTION, SOLUTION INTRAVENOUS
Status: DISCONTINUED
Start: 2018-06-06 | End: 2018-06-06 | Stop reason: WASHOUT

## 2018-06-06 NOTE — PROGRESS NOTES
[facility] Cardiology Progress Note        Tito Swann Patient Status:  Inpatient    1945 MRN [id number]   [facility] 8NE-A Attending Jayson Cook MD   Hosp Day # 2 PCP Michelle Garza MD     Subjective:  R 159/81    General: No apparent distress  HEENT: No focal deficits. Neck: supple. JVP normal  Cardiac: Regular rhythm, S1, S2 normal,  rub or gallop. No murmur. Lungs: CTA  Abdomen: Soft, non-tender.    Extremities: No edema  Neurologic: no focal deficits

## 2018-06-06 NOTE — PROGRESS NOTES
[facility]  Progress Note     Jn Valdohsi Patient Status:  Inpatient    1945 MRN [id number]   [facility] 8NE-A Attending Denisha Diehl MD   Hosp Day # 2 PCP Lj Dee MD     Chief Complaint: PNA    S: Patient feels HCl  20 mg Oral BID   • FLUoxetine HCl  60 mg Oral Daily   • mupirocin   Topical TID   • Mirabegron ER  50 mg Oral Daily   • Pravastatin Sodium  20 mg Oral Nightly   • Prazosin HCl  5 mg Oral Nightly   • Pantoprazole Sodium  20 mg Oral QAM AC   • cefTRIAXo

## 2018-06-06 NOTE — RESPIRATORY THERAPY NOTE
ABDIAZIZ - Equipment Use Daily Summary:  · Set Mode CFLEX  · Usage in hours: 8:30  · 90% Pressure (EPAP) level: 9.0  · 90% Insp Pressure (IPAP):   · AHI:   · Supplemental Oxygen:   · Comments:

## 2018-06-06 NOTE — PLAN OF CARE
Patient is alert and oriented. On room air, was able to ween off oxygen. NSR on the tele. Denies any pain or SOB. Patient is wheel chair bound, able to transfer to the wheel chair with supervision.  Plan is for patient to have stress test today and pending

## 2018-06-06 NOTE — PROGRESS NOTES
Preliminary Lexiscan Stress Test Note    No ECG changes noted  Pt denied cardiac symptoms  Rare isolated PVCs noted  Nuclear pending

## 2018-06-06 NOTE — PHYSICAL THERAPY NOTE
PHYSICAL THERAPY TREATMENT NOTE - INPATIENT    Room Number: 5208/2054-T     Session: 1   Number of Visits to Meet Established Goals: 5    Presenting Problem: pneumonia    History related to current admission: Pt is 74year old female admitted on 6/4/2018 help.\"    Patient’s self-stated goal is to return home.      OBJECTIVE  Precautions: None    WEIGHT BEARING RESTRICTION  Weight Bearing Restriction: None                PAIN ASSESSMENT   Ratin  Location: left foot  Management Techniques: Breathing tech verbalizes understanding. Patient End of Session: Up in chair;Needs met;Call light within reach;RN aware of session/findings; All patient questions and concerns addressed    ASSESSMENT   Pt demonstrates improved functional independence this session wi

## 2018-06-06 NOTE — OCCUPATIONAL THERAPY NOTE
OCCUPATIONAL THERAPY EVALUATION - INPATIENT     Room Number: 8220/8539-D  Evaluation Date: 6/6/2018  Type of Evaluation: Quick Eval  Presenting Problem: Fever, Pneumonia    Physician Order: IP Consult to Occupational Therapy  Reason for Therapy: ADL/IADL D Osteomyelitis of finger ([facility] 2/25/2015   • Sleep apnea    • Ulcer of finger ([facility]        Past Surgical History  Past Surgical History:  No date: AMPUTATE METACARPAL+FINGER Left      Comment: left index finger  No date: AMPUTATE METACARPAL+FINGER Right Commands:  follows one step commands without difficulty, follows one step commands consistently, follows multistep commands without difficulty and follows multistep commands consistently  Awareness of Deficits:  fully aware of deficits    VISION  Current V request, sat: 97%. Therapist completed assessment of current function related to mobility and ADL's. Supine<> sit Min A to pt's manual wheelchair, cuing provided for body mechanics. Pt performed grooming and hygiene activity Mod I in manual wheelchair.   Cruz Khan

## 2018-06-06 NOTE — PLAN OF CARE
Assumed care of patient at 1100. Awaiting lexiscan today. If negative home with antibiotics for PNA. Wheelchair bound. Resides at [facility]. 1720: stress test is negative,. Dr. Kallie Romero notified.  Dr. Helder Francis and Dr. Kallie Romero are ok with dc back

## 2018-06-07 ENCOUNTER — PATIENT OUTREACH (OUTPATIENT)
Dept: CASE MANAGEMENT | Age: 73
End: 2018-06-07

## 2018-06-07 DIAGNOSIS — Z02.9 ENCOUNTERS FOR ADMINISTRATIVE PURPOSE: ICD-10-CM

## 2018-06-07 NOTE — PLAN OF CARE
NURSING DISCHARGE NOTE    Discharged [id number] via ambulance. Accompanied by emt  Belongings all taken with her. RN attempted to call report and was unsuccessful. RN spoke with nelda will who called Keyona Mckenna (director).  Kellie Corcoran stated that she is ok to re

## 2018-06-08 NOTE — DISCHARGE SUMMARY
[facility] HOSPITALIST  DISCHARGE SUMMARY     Lisy Singh Patient Status:  Inpatient    1945 MRN [id number]   [facility] 8NE-A Attending No att. providers found   Hosp Day # 2 PCP Meta Opitz, MD     Date of Admission: 2018  Dashawn Incidental or significant findings and recommendations (brief descriptions):  • Complete course of antibiotics     Lab/Test results pending at Discharge:   · None    Consultants:  • Cardiology     Discharge Medication List:     Discharge Medications hydrocortisone 2.5 % Crea      Apply topically 2 (two) times daily. Refills:  0     loratadine 10 MG Tabs  Commonly known as:  CLARITIN      Take 10 mg by mouth daily.    Refills:  0     Losartan Potassium 50 MG Tabs  Commonly known as:  [place] reviewed: ELIZABETH     Follow-up appointment:   Meryle Barrs, [address] [facility] [address]  [phone]    In 2 weeks    -----------------------------------------------------------------------------------------------  [address]

## 2018-06-11 ENCOUNTER — TELEPHONE (OUTPATIENT)
Dept: INTERNAL MEDICINE CLINIC | Facility: CLINIC | Age: 73
End: 2018-06-11

## 2018-06-11 NOTE — TELEPHONE ENCOUNTER
April Maddox RN calling to say Pt scored high on PHQ2. Would like to have an order for Pt to have a social work consult. Pt already takes meds for depression.

## 2018-06-12 ENCOUNTER — TELEPHONE (OUTPATIENT)
Dept: INTERNAL MEDICINE CLINIC | Facility: CLINIC | Age: 73
End: 2018-06-12

## 2018-06-12 NOTE — TELEPHONE ENCOUNTER
Spoke with Fabiola Quinteros RN informed ok per TB for social work consult. Fabiola Quinteros RN verbalized understanding and agreeable to POC.

## 2018-06-12 NOTE — TELEPHONE ENCOUNTER
Called Marlys with [facility] on VM at [phone] indicating ok per TB for vaseline gauze and foam dressing. Oz Leana to call back with any further questions.

## 2018-06-12 NOTE — TELEPHONE ENCOUNTER
Norm Castro with [facility] called stating this patient has a new skin tear on her left shin. It's 3 cm x 3 cm. They applied dry gauze. She would like an order for Vaseline gauze and foam dressing. Please advise.

## 2018-06-13 ENCOUNTER — OFFICE VISIT (OUTPATIENT)
Dept: INTERNAL MEDICINE CLINIC | Facility: CLINIC | Age: 73
End: 2018-06-13

## 2018-06-13 VITALS
HEART RATE: 70 BPM | SYSTOLIC BLOOD PRESSURE: 130 MMHG | OXYGEN SATURATION: 97 % | TEMPERATURE: 98 F | DIASTOLIC BLOOD PRESSURE: 60 MMHG | HEIGHT: 68 IN

## 2018-06-13 DIAGNOSIS — E11.42 CONTROLLED TYPE 2 DIABETES MELLITUS WITH DIABETIC POLYNEUROPATHY, WITHOUT LONG-TERM CURRENT USE OF INSULIN (HCC): ICD-10-CM

## 2018-06-13 DIAGNOSIS — R77.8 ELEVATED TROPONIN: ICD-10-CM

## 2018-06-13 DIAGNOSIS — I10 BENIGN ESSENTIAL HTN: ICD-10-CM

## 2018-06-13 DIAGNOSIS — J18.9 COMMUNITY ACQUIRED PNEUMONIA OF LEFT LOWER LOBE OF LUNG: Primary | ICD-10-CM

## 2018-06-13 DIAGNOSIS — M79.2 NEUROPATHIC PAIN: ICD-10-CM

## 2018-06-13 PROCEDURE — 1111F DSCHRG MED/CURRENT MED MERGE: CPT | Performed by: INTERNAL MEDICINE

## 2018-06-13 PROCEDURE — 99495 TRANSJ CARE MGMT MOD F2F 14D: CPT | Performed by: INTERNAL MEDICINE

## 2018-06-13 RX ORDER — AZITHROMYCIN 250 MG/1
TABLET, FILM COATED ORAL
Qty: 6 TABLET | Refills: 0 | Status: SHIPPED | OUTPATIENT
Start: 2018-06-13 | End: 2018-09-28 | Stop reason: ALTCHOICE

## 2018-06-13 NOTE — PROGRESS NOTES
HPI:    Daisha Gracia is a 74year old female here today for hospital follow up.    She was discharged from Inpatient hospital, [facility] to Home   Admit Date: 6/4/2018  Discharge Date: 6/6/2018  Hospital Discharge Diagnosis:    Community acquired was negative for any reversible ischemia. She had normal LV function. Patient sent home on augmentin but only took 2 days worth of medication due to diarrhea. Today, she reports breathing is much better. Cough is minimal. No f/c/SOB.  Her BP is controlled o every morning before breakfast.   Pravastatin Sodium 20 MG Oral Tab Take 20 mg by mouth nightly. Prazosin HCl 5 MG Oral Cap Take 5 mg by mouth nightly. TraMADol HCl 50 MG Oral Tab Take 50 mg by mouth every 8 (eight) hours as needed for Pain.    pregabal has never used smokeless tobacco. She reports that she does not drink alcohol or use drugs.      ROS:   GENERAL: weight stable, energy stable, no sweating  SKIN: denies any unusual skin lesions  EYES: denies blurred vision or double vision  HEENT: denies na polyneuropathy, without long-term current use of insulin ([facility]- BG controlled, check rpt labs in 3 months. Eye exam requested from Dr. Minerva Mora (done in Dec 2017)  -     HEMOGLOBIN A1C; Future  -     COMP METABOLIC PANEL (14);  Future    Neuropathic pain-

## 2018-06-14 NOTE — PROGRESS NOTES
Multiple attempts to reach pt and messages left with no return call. Pt went in for HFU appt with PCP on 6/13/18. Encounter closing.

## 2018-06-18 ENCOUNTER — TELEPHONE (OUTPATIENT)
Dept: INTERNAL MEDICINE CLINIC | Facility: CLINIC | Age: 73
End: 2018-06-18

## 2018-06-18 RX ORDER — SITAGLIPTIN 50 MG/1
TABLET, FILM COATED ORAL
Qty: 30 TABLET | Refills: 0 | Status: SHIPPED | OUTPATIENT
Start: 2018-06-18 | End: 2018-08-14

## 2018-06-18 RX ORDER — GLIPIZIDE 10 MG/1
TABLET, FILM COATED, EXTENDED RELEASE ORAL
Qty: 30 TABLET | Refills: 0 | Status: SHIPPED | OUTPATIENT
Start: 2018-06-18 | End: 2018-08-20

## 2018-06-18 RX ORDER — LOSARTAN POTASSIUM 50 MG/1
TABLET ORAL
Qty: 30 TABLET | Refills: 0 | Status: SHIPPED | OUTPATIENT
Start: 2018-06-18 | End: 2018-08-14

## 2018-06-18 NOTE — TELEPHONE ENCOUNTER
LOV:6/13/2018   Last refill:5/8/2018 90 cap 0 refills  Last lab:6/5/2018 bmp, cbc       Per protocol route to provider

## 2018-06-21 ENCOUNTER — TELEPHONE (OUTPATIENT)
Dept: INTERNAL MEDICINE CLINIC | Facility: CLINIC | Age: 73
End: 2018-06-21

## 2018-06-21 NOTE — TELEPHONE ENCOUNTER
Willard Desouza RN calling to say that the Pt's home care nurse called her to say that the Pt's wound on her leg has pus with white drainage, and wants a wound care nurse care to go out to look at it.

## 2018-06-21 NOTE — TELEPHONE ENCOUNTER
Called and spoke with Italo Chaparro, RN @[facility] to inform, per MILA, ok for wound care nurse but needs to be seen in office for eval. Italo Chaparro verbalized understanding and agreed with POC.

## 2018-06-25 ENCOUNTER — PATIENT OUTREACH (OUTPATIENT)
Dept: CASE MANAGEMENT | Age: 73
End: 2018-06-25

## 2018-06-25 NOTE — PROGRESS NOTES
Attempted contacting pt to intro [facility] services with no answer. Will continue contacting to discuss.

## 2018-07-05 RX ORDER — CLOTRIMAZOLE 1 %
CREAM (GRAM) TOPICAL
Qty: 60 G | Refills: 10 | Status: SHIPPED | OUTPATIENT
Start: 2018-07-05 | End: 2019-07-08

## 2018-07-18 ENCOUNTER — PATIENT OUTREACH (OUTPATIENT)
Dept: CASE MANAGEMENT | Age: 73
End: 2018-07-18

## 2018-07-18 NOTE — PROGRESS NOTES
Attempted contacting pt to intro [facility] services with no answer. Will continue contacting to discuss.

## 2018-07-30 NOTE — TELEPHONE ENCOUNTER
Last Office Visit: 6-13-18 with TB for hospital follow up  Last Rx Filled:   Tramadol historical  Omeprazole historical   Last Labs: 6-5-18 BMP/cbc  Future Appointment: 9-11-18    Per protocol to provider

## 2018-08-01 RX ORDER — OMEPRAZOLE 20 MG/1
CAPSULE, DELAYED RELEASE ORAL
Qty: 90 CAPSULE | Refills: 1 | Status: SHIPPED | OUTPATIENT
Start: 2018-08-01 | End: 2019-01-14

## 2018-08-01 RX ORDER — TRAMADOL HYDROCHLORIDE 50 MG/1
TABLET ORAL
Qty: 120 TABLET | Refills: 0 | Status: SHIPPED | OUTPATIENT
Start: 2018-08-01 | End: 2018-09-07

## 2018-08-13 RX ORDER — PREGABALIN 100 MG/1
CAPSULE ORAL
Qty: 90 CAPSULE | Refills: 0 | Status: SHIPPED | OUTPATIENT
Start: 2018-08-13 | End: 2018-09-18

## 2018-08-13 NOTE — TELEPHONE ENCOUNTER
LOV-6/13/18 TB  FOV-9/11/18 TB  LAST RX-6/18/18 90 tabs 0 refills  LAST LABS-6/5/18  PER PROTOCOL-to provider

## 2018-08-14 RX ORDER — SITAGLIPTIN 50 MG/1
TABLET, FILM COATED ORAL
Qty: 30 TABLET | Refills: 2 | Status: SHIPPED | OUTPATIENT
Start: 2018-08-14 | End: 2018-10-15

## 2018-08-14 RX ORDER — LOSARTAN POTASSIUM 50 MG/1
TABLET ORAL
Qty: 30 TABLET | Refills: 2 | Status: SHIPPED | OUTPATIENT
Start: 2018-08-14 | End: 2019-04-15

## 2018-08-14 RX ORDER — ERGOCALCIFEROL 1.25 MG/1
CAPSULE ORAL
Qty: 1 CAPSULE | Refills: 10 | Status: SHIPPED | OUTPATIENT
Start: 2018-08-14 | End: 2019-07-08

## 2018-08-14 NOTE — TELEPHONE ENCOUNTER
LOV:6/13/18 TB  FOV:9/11/18  LAST RX:    6/18/18Januvia 50 mg 1 tablet daily 30 tabs 0 refills     6/18/18Losartan 50 mg 1 tablet daily 30 tabs 0 refills     LAST LABS:6/6/18 poct glucose   PER PROTOCOL: filled protocol passed for rx

## 2018-08-14 NOTE — TELEPHONE ENCOUNTER
YJJ:9/11/10 TB  FOV:9/11/18  LAST RX: 4/2/18 take 50,000 by mouth every 30 days .  1 ordered  capsule with 5 refills    LAST LABS:6/6/18 poct glucose,6/5/18 poct glucose, bmp,troponin,cbc, manual differential.  PER PROTOCOL: to provider

## 2018-08-20 RX ORDER — GLIPIZIDE 10 MG/1
TABLET, FILM COATED, EXTENDED RELEASE ORAL
Qty: 90 TABLET | Refills: 10 | Status: SHIPPED | OUTPATIENT
Start: 2018-08-20 | End: 2019-07-08

## 2018-08-24 ENCOUNTER — PATIENT OUTREACH (OUTPATIENT)
Dept: CASE MANAGEMENT | Age: 73
End: 2018-08-24

## 2018-08-24 ENCOUNTER — TELEPHONE (OUTPATIENT)
Dept: INTERNAL MEDICINE CLINIC | Facility: CLINIC | Age: 73
End: 2018-08-24

## 2018-08-24 NOTE — TELEPHONE ENCOUNTER
Regarding last eye exam with Miriam in 12/17:  Pt told TB that this was DM eye exam.  It was NOT, was a problem visit.   Please let pt know she will need to return to [facility] for dilated diabetic eye exam.

## 2018-08-27 RX ORDER — CYANOCOBALAMIN 1000 UG/ML
INJECTION INTRAMUSCULAR; SUBCUTANEOUS
Qty: 1 ML | Refills: 0 | OUTPATIENT
Start: 2018-08-27

## 2018-08-30 RX ORDER — CYANOCOBALAMIN 1000 UG/ML
INJECTION INTRAMUSCULAR; SUBCUTANEOUS
Refills: 0 | OUTPATIENT
Start: 2018-08-30

## 2018-08-30 NOTE — TELEPHONE ENCOUNTER
Lyndsay is requesting a refill on medication not found in current medication list or historic .  Pended for your approval

## 2018-09-04 DIAGNOSIS — E53.8 VITAMIN B 12 DEFICIENCY: Primary | ICD-10-CM

## 2018-09-05 RX ORDER — CYANOCOBALAMIN 1000 UG/ML
INJECTION INTRAMUSCULAR; SUBCUTANEOUS
Qty: 1 ML | Refills: 0 | OUTPATIENT
Start: 2018-09-05

## 2018-09-05 NOTE — TELEPHONE ENCOUNTER
[phone]  Lm for pt ([id number] Kendy Vidal per HIPAA) to inform, per TB, needs ordered b12 level done first to see if needs injections still as last level over 1000. To call back at the office if any further questions.

## 2018-09-05 NOTE — TELEPHONE ENCOUNTER
LOV: 06/13/2018  Future Visit: 09/11/2018  Last Labs: 06/05/2018  Last Rx: 06/06/2016 Discontinued    Per protocol sent to provider

## 2018-09-07 ENCOUNTER — TELEPHONE (OUTPATIENT)
Dept: INTERNAL MEDICINE CLINIC | Facility: CLINIC | Age: 73
End: 2018-09-07

## 2018-09-07 RX ORDER — TRAMADOL HYDROCHLORIDE 50 MG/1
TABLET ORAL
Qty: 120 TABLET | Refills: 0 | Status: SHIPPED | OUTPATIENT
Start: 2018-09-07 | End: 2018-10-08

## 2018-09-07 NOTE — TELEPHONE ENCOUNTER
Fax received from [facility] for potential clinical concerns on cyclobenzaprine . Form placed in TB bin  To be reviewed .

## 2018-09-07 NOTE — TELEPHONE ENCOUNTER
Please fax lab orders to [facility] for them to draw the labs there-the fax number is [phone]-[facility] cannot get to [facility] so please fax them to nelda will

## 2018-09-07 NOTE — TELEPHONE ENCOUNTER
Pharm calling stating they sent us for refill of tramadol 50mg and have not heard back yet-I do not see request-please send in refill

## 2018-09-07 NOTE — TELEPHONE ENCOUNTER
LOV: 06/13/2018  Future Visit: 09/11/2018  Last Labs: 82/54/8922 Basic Metabolic panel, Troponin, CBC, Manual Differential   Last Rx: 08/01/2018  Per protocol sent to provider

## 2018-09-11 RX ORDER — B-COMPLEX WITH VITAMIN C
TABLET ORAL
Qty: 60 TABLET | Refills: 11 | Status: SHIPPED | OUTPATIENT
Start: 2018-09-11 | End: 2019-03-22

## 2018-09-11 NOTE — TELEPHONE ENCOUNTER
LOV: 06/13/2018  Future Visit: 09/28/2018  Last Labs: 11/76/2670 basic metabolic panel, troponin, manual differential, CBC  Last Rx: Historical   Per protocol sent to provider

## 2018-09-13 ENCOUNTER — APPOINTMENT (OUTPATIENT)
Dept: LAB | Age: 73
End: 2018-09-13
Attending: INTERNAL MEDICINE
Payer: MEDICARE

## 2018-09-13 DIAGNOSIS — Z00.00 PHYSICAL EXAM: ICD-10-CM

## 2018-09-13 DIAGNOSIS — E66.9 DIABETES MELLITUS TYPE 2 IN OBESE (HCC): ICD-10-CM

## 2018-09-13 DIAGNOSIS — E11.69 DIABETES MELLITUS TYPE 2 IN OBESE (HCC): ICD-10-CM

## 2018-09-13 LAB
ALBUMIN SERPL-MCNC: 3 G/DL (ref 3.5–4.8)
ALBUMIN/GLOB SERPL: 0.9 {RATIO} (ref 1–2)
ALP LIVER SERPL-CCNC: 94 U/L (ref 55–142)
ALT SERPL-CCNC: 15 U/L (ref 14–54)
ANION GAP SERPL CALC-SCNC: 5 MMOL/L (ref 0–18)
AST SERPL-CCNC: 9 U/L (ref 15–41)
BILIRUB SERPL-MCNC: 0.2 MG/DL (ref 0.1–2)
BUN BLD-MCNC: 21 MG/DL (ref 8–20)
BUN/CREAT SERPL: 25.6 (ref 10–20)
CALCIUM BLD-MCNC: 8.4 MG/DL (ref 8.3–10.3)
CHLORIDE SERPL-SCNC: 104 MMOL/L (ref 101–111)
CO2 SERPL-SCNC: 31 MMOL/L (ref 22–32)
CREAT BLD-MCNC: 0.82 MG/DL (ref 0.55–1.02)
EST. AVERAGE GLUCOSE BLD GHB EST-MCNC: 128 MG/DL (ref 68–126)
GLOBULIN PLAS-MCNC: 3.4 G/DL (ref 2.5–4)
GLUCOSE BLD-MCNC: 91 MG/DL (ref 70–99)
HAV AB SERPL IA-ACNC: 281 PG/ML (ref 193–986)
HBA1C MFR BLD HPLC: 6.1 % (ref ?–5.7)
M PROTEIN MFR SERPL ELPH: 6.4 G/DL (ref 6.1–8.3)
OSMOLALITY SERPL CALC.SUM OF ELEC: 293 MOSM/KG (ref 275–295)
POTASSIUM SERPL-SCNC: 3.7 MMOL/L (ref 3.6–5.1)
SODIUM SERPL-SCNC: 140 MMOL/L (ref 136–144)

## 2018-09-13 PROCEDURE — 82607 VITAMIN B-12: CPT

## 2018-09-13 PROCEDURE — 80053 COMPREHEN METABOLIC PANEL: CPT

## 2018-09-13 PROCEDURE — 36415 COLL VENOUS BLD VENIPUNCTURE: CPT

## 2018-09-13 PROCEDURE — 83036 HEMOGLOBIN GLYCOSYLATED A1C: CPT

## 2018-09-17 ENCOUNTER — TELEPHONE (OUTPATIENT)
Dept: INTERNAL MEDICINE CLINIC | Facility: CLINIC | Age: 73
End: 2018-09-17

## 2018-09-17 NOTE — TELEPHONE ENCOUNTER
Pt calling back regarding letter she received from our office regarding her diabetic eye exam. Pt will be seeing Dr. Arthur Koyanagi on 9-20-18.

## 2018-09-17 NOTE — TELEPHONE ENCOUNTER
LOV:6/13/18 TB  FOV:9/28/18  LAST RX:8/13/18 100 mg 1 cap 3 times a day 90 caps 0 refills   LAST LABS:9/13/18 A1c,cmp,vit b12  PER PROTOCOL: to provider

## 2018-09-18 RX ORDER — PREGABALIN 100 MG/1
CAPSULE ORAL
Qty: 90 CAPSULE | Refills: 0 | Status: SHIPPED | OUTPATIENT
Start: 2018-09-18 | End: 2018-10-15

## 2018-09-18 NOTE — TELEPHONE ENCOUNTER
LOV: 06/13/2018  Future Visit: 09/28/2018  Last Labs: 09/13/2018 Hemoglobin, COMP, Vitamin B12  Last Rx: 08/13/2018  Per protocol sent to provider

## 2018-09-19 RX ORDER — PREGABALIN 100 MG/1
CAPSULE ORAL
Qty: 90 CAPSULE | Refills: 0 | OUTPATIENT
Start: 2018-09-19 | End: 2018-09-28

## 2018-09-28 ENCOUNTER — OFFICE VISIT (OUTPATIENT)
Dept: INTERNAL MEDICINE CLINIC | Facility: CLINIC | Age: 73
End: 2018-09-28
Payer: MEDICARE

## 2018-09-28 VITALS
RESPIRATION RATE: 18 BRPM | SYSTOLIC BLOOD PRESSURE: 138 MMHG | HEART RATE: 82 BPM | DIASTOLIC BLOOD PRESSURE: 66 MMHG | TEMPERATURE: 98 F

## 2018-09-28 DIAGNOSIS — E11.42 CONTROLLED TYPE 2 DIABETES MELLITUS WITH DIABETIC POLYNEUROPATHY, WITHOUT LONG-TERM CURRENT USE OF INSULIN (HCC): ICD-10-CM

## 2018-09-28 DIAGNOSIS — E53.8 B12 DEFICIENCY: ICD-10-CM

## 2018-09-28 DIAGNOSIS — G60.0 CHARCOT-MARIE-TOOTH DISEASE: Primary | ICD-10-CM

## 2018-09-28 PROCEDURE — 99214 OFFICE O/P EST MOD 30 MIN: CPT | Performed by: INTERNAL MEDICINE

## 2018-09-28 RX ORDER — TRAZODONE HYDROCHLORIDE 100 MG/1
TABLET ORAL
COMMUNITY
Start: 2018-09-20 | End: 2018-09-28

## 2018-09-28 RX ORDER — CYANOCOBALAMIN 1000 UG/ML
1000 INJECTION INTRAMUSCULAR; SUBCUTANEOUS ONCE
Status: SHIPPED | OUTPATIENT
Start: 2018-09-28 | End: 2038-09-28

## 2018-09-28 RX ORDER — FLUOXETINE HYDROCHLORIDE 20 MG/1
CAPSULE ORAL
COMMUNITY
Start: 2018-09-25 | End: 2018-09-28

## 2018-09-28 RX ORDER — PREDNISOLONE ACETATE 10 MG/ML
SUSPENSION/ DROPS OPHTHALMIC
COMMUNITY
Start: 2018-08-03 | End: 2019-03-22

## 2018-09-28 RX ORDER — ZOLPIDEM TARTRATE 5 MG/1
5 TABLET ORAL NIGHTLY
COMMUNITY
Start: 2018-09-24

## 2018-09-28 NOTE — PROGRESS NOTES
Jn Pedroza is a 74year old female. Patient presents with:   Follow - Up: AB RM 3 dm, pain , labs      HPI:     Patient with Charcot-Rima Tooth disease, chronic pain from this along with OA and phantom pains (BKA RLE), HTN, HL,morbid obesity, anxiety TABLET BY MOUTH EVERY DAY Disp: 90 tablet Rfl: 10   ERGOCALCIFEROL 81831 units Oral Cap TAKE ONE CAPSULE BY MOUTH MONTHLY Disp: 1 capsule Rfl: 10   JANUVIA 50 MG Oral Tab TAKE ONE TABLET BY MOUTH EVERY DAY Disp: 30 tablet Rfl: 2   LOSARTAN POTASSIUM 50 MG lungs every 4 (four) hours as needed for Wheezing. Disp: 1 Inhaler Rfl: 0   Acetaminophen (MAPAP) 500 MG Oral Cap Take 500-1,000 mg by mouth every 8 (eight) hours as needed for Fever. Disp:  Rfl:    Prazosin HCl 5 MG Oral Cap Take 5 mg by mouth nightly.  Ly Miguel lesions  RESPIRATORY: denies shortness of breath   CARDIOVASCULAR: denies chest pain   GI: denies abdominal pain   : no dysuria, hematuria, genital complaints  MUSCULOSKELETAL:chronic pain  PSYCH: mood doing ok  HEME: No easy bruising, bleeding or adenop

## 2018-10-01 NOTE — TELEPHONE ENCOUNTER
LOV: 09/28/2018  Future Visit: 12/21/2018  Last Labs: 09/13/2018 Hemoglobin, COMP, Vitamin B12  Last Rx: Historical   Per protocol sent to provider

## 2018-10-10 RX ORDER — TRAMADOL HYDROCHLORIDE 50 MG/1
TABLET ORAL
Qty: 120 TABLET | Refills: 0 | OUTPATIENT
Start: 2018-10-10 | End: 2018-10-11

## 2018-10-10 RX ORDER — CYCLOBENZAPRINE HCL 10 MG
TABLET ORAL
Qty: 30 TABLET | Refills: 10 | Status: SHIPPED | OUTPATIENT
Start: 2018-10-10 | End: 2018-10-11

## 2018-10-10 NOTE — TELEPHONE ENCOUNTER
Called patient to see if she has enough medication to last till tomorrow for TB's return, no answer left VM for patient to call office back .

## 2018-10-10 NOTE — TELEPHONE ENCOUNTER
Pt called back to say she does have enough pills until TB return on 10/11/2018    See note dated 10/10/2018

## 2018-10-11 ENCOUNTER — TELEPHONE (OUTPATIENT)
Dept: INTERNAL MEDICINE CLINIC | Facility: CLINIC | Age: 73
End: 2018-10-11

## 2018-10-11 RX ORDER — CYCLOBENZAPRINE HCL 10 MG
TABLET ORAL
Qty: 30 TABLET | Refills: 10 | Status: SHIPPED | OUTPATIENT
Start: 2018-10-11 | End: 2019-03-22

## 2018-10-11 RX ORDER — TRAMADOL HYDROCHLORIDE 50 MG/1
TABLET ORAL
Qty: 120 TABLET | Refills: 0 | Status: SHIPPED | OUTPATIENT
Start: 2018-10-11 | End: 2019-03-22

## 2018-10-11 RX ORDER — TRAMADOL HYDROCHLORIDE 50 MG/1
TABLET ORAL
Qty: 120 TABLET | Refills: 0 | Status: SHIPPED | OUTPATIENT
Start: 2018-10-11 | End: 2019-03-25

## 2018-10-11 RX ORDER — MIRABEGRON 50 MG/1
TABLET, FILM COATED, EXTENDED RELEASE ORAL
Qty: 30 TABLET | Refills: 0 | Status: SHIPPED | OUTPATIENT
Start: 2018-10-11 | End: 2018-12-10

## 2018-10-15 RX ORDER — PREGABALIN 100 MG/1
CAPSULE ORAL
Qty: 90 CAPSULE | Refills: 0 | Status: SHIPPED | OUTPATIENT
Start: 2018-10-15 | End: 2018-11-12

## 2018-10-15 RX ORDER — LOSARTAN POTASSIUM 50 MG/1
TABLET ORAL
Qty: 30 TABLET | Refills: 2 | Status: SHIPPED | OUTPATIENT
Start: 2018-10-15 | End: 2019-03-22

## 2018-10-15 RX ORDER — SITAGLIPTIN 50 MG/1
TABLET, FILM COATED ORAL
Qty: 30 TABLET | Refills: 2 | Status: SHIPPED | OUTPATIENT
Start: 2018-10-15 | End: 2019-01-14

## 2018-10-15 NOTE — TELEPHONE ENCOUNTER
LOV:9/28/18 TB  FOV:12/21/18  LAST RX:9/18/18 100 mg take one cap by mouth 3 times a day 90 days 0 refills   LAST LABS:9/13/18A1c,cmp,Vit B12  PER PROTOCOL: to provider

## 2018-10-29 RX ORDER — ALLOPURINOL 300 MG/1
TABLET ORAL
Qty: 90 TABLET | Refills: 1 | Status: SHIPPED | OUTPATIENT
Start: 2018-10-29 | End: 2019-04-01

## 2018-10-29 NOTE — TELEPHONE ENCOUNTER
LOV: 9/28/18 w/ TB  FOV: 12/21/18 MWV  Last labs: 9/13/18 A1C,CMP,VIT B12  Last Refill: 5/31/18 qt:90 1 refill    Per protocol sent to provider

## 2018-11-05 ENCOUNTER — NURSE ONLY (OUTPATIENT)
Dept: INTERNAL MEDICINE CLINIC | Facility: CLINIC | Age: 73
End: 2018-11-05
Payer: MEDICARE

## 2018-11-05 PROCEDURE — 96372 THER/PROPH/DIAG INJ SC/IM: CPT | Performed by: INTERNAL MEDICINE

## 2018-11-05 RX ORDER — CYANOCOBALAMIN 1000 UG/ML
1000 INJECTION INTRAMUSCULAR; SUBCUTANEOUS ONCE
Status: COMPLETED | OUTPATIENT
Start: 2018-11-05 | End: 2018-11-05

## 2018-11-05 RX ADMIN — CYANOCOBALAMIN 1000 MCG: 1000 INJECTION INTRAMUSCULAR; SUBCUTANEOUS at 14:14:00

## 2018-11-12 RX ORDER — PREGABALIN 100 MG/1
CAPSULE ORAL
Qty: 90 CAPSULE | Refills: 0 | Status: SHIPPED | OUTPATIENT
Start: 2018-11-12 | End: 2018-12-17

## 2018-11-12 NOTE — TELEPHONE ENCOUNTER
LFV: 09/28/2018 with TB  Future Appt: 12/21/2018 with TB  Last Rx: Mupirocin 12/20/17 for 22g with 4 refills  Lyrica 10/15/2018 for 90 capsules  Hydrocortisone 2.5% 28g on 10/1/18 no refills  Last Labs: 09/13/18 a1c, cmp and vitamin b12  Per protocol to pr

## 2018-11-26 RX ORDER — OMEPRAZOLE 20 MG/1
CAPSULE, DELAYED RELEASE ORAL
Qty: 30 CAPSULE | Refills: 4 | OUTPATIENT
Start: 2018-11-26

## 2018-11-26 NOTE — TELEPHONE ENCOUNTER
Last Office Visit: 9-28-18 with TB for follow up  Last Rx Filled:10-11-18 120 tabs with no refills   Last Labs: 9-13-18 hga1c/cmp/vitamin B12  Future Appointment: 12-21-18     Per protocol to provider

## 2018-11-27 RX ORDER — TRAMADOL HYDROCHLORIDE 50 MG/1
TABLET ORAL
Qty: 120 TABLET | Refills: 1 | Status: SHIPPED | OUTPATIENT
Start: 2018-11-27 | End: 2019-03-22

## 2018-11-30 RX ORDER — WATER
LIQUID (ML) MISCELLANEOUS
Qty: 4000 ML | Refills: 10 | Status: SHIPPED | OUTPATIENT
Start: 2018-11-30

## 2018-12-10 RX ORDER — MIRABEGRON 50 MG/1
TABLET, FILM COATED, EXTENDED RELEASE ORAL
Qty: 30 TABLET | Refills: 0 | Status: SHIPPED | OUTPATIENT
Start: 2018-12-10 | End: 2019-01-07

## 2018-12-10 NOTE — TELEPHONE ENCOUNTER
LFV: 09/28/18 with TB  Future Appt: 12/21/18 with TB  Last Rx:10/11/18 for 30 tablets  Last Labs: 09/13/18 a1c and cmp  Per protocol to provider

## 2018-12-11 RX ORDER — MIRABEGRON 50 MG/1
TABLET, FILM COATED, EXTENDED RELEASE ORAL
Qty: 30 TABLET | Refills: 11 | OUTPATIENT
Start: 2018-12-11

## 2018-12-11 NOTE — TELEPHONE ENCOUNTER
Last Office Visit: 9-28-18 with TB for follow up  Last Rx Filled: 11-12-18 28g with no refills   Last Labs: 9-13-18 hga1c/cmp/vitamin B12  Future Appointment: 12-21-18    Per protocol to provider

## 2018-12-17 RX ORDER — PREGABALIN 100 MG/1
100 CAPSULE ORAL 3 TIMES DAILY
Qty: 90 CAPSULE | Refills: 2 | Status: SHIPPED | OUTPATIENT
Start: 2018-12-17 | End: 2019-03-18

## 2018-12-17 NOTE — TELEPHONE ENCOUNTER
Last Office Visit: 9-28-18 with TB for follow up  Last Rx Filled: 11-12-18 90 caps with no refills  Last Labs: 9-13-18 hga1c/cmp/vitamin B12  Future Appointment: 12-21-18    Per protocol to provider

## 2018-12-18 ENCOUNTER — APPOINTMENT (OUTPATIENT)
Dept: LAB | Age: 73
End: 2018-12-18
Attending: INTERNAL MEDICINE
Payer: MEDICARE

## 2018-12-18 DIAGNOSIS — E11.69 CONTROLLED TYPE 2 DIABETES MELLITUS WITH OTHER SPECIFIED COMPLICATION, WITHOUT LONG-TERM CURRENT USE OF INSULIN (HCC): ICD-10-CM

## 2018-12-18 DIAGNOSIS — I10 BENIGN ESSENTIAL HTN: ICD-10-CM

## 2018-12-18 PROCEDURE — 83036 HEMOGLOBIN GLYCOSYLATED A1C: CPT

## 2018-12-18 PROCEDURE — 36415 COLL VENOUS BLD VENIPUNCTURE: CPT

## 2018-12-18 PROCEDURE — 80053 COMPREHEN METABOLIC PANEL: CPT

## 2018-12-20 RX ORDER — WATER
LIQUID (ML) MISCELLANEOUS
Qty: 4000 ML | Refills: 10 | OUTPATIENT
Start: 2018-12-20

## 2019-01-02 RX ORDER — TRAMADOL HYDROCHLORIDE 50 MG/1
TABLET ORAL
Qty: 120 TABLET | Refills: 0 | Status: SHIPPED | OUTPATIENT
Start: 2019-01-02 | End: 2019-03-22

## 2019-01-02 NOTE — TELEPHONE ENCOUNTER
Last Office Visit: 9-28-18 with TB for follow up  Last Rx Filled: 11-27-18 120 tabs with 1 refill   Last Labs: 12-18-18 hga1c/cmp  Future Appointment: 1-18-19    Per protocol to provider

## 2019-01-07 RX ORDER — MIRABEGRON 50 MG/1
TABLET, FILM COATED, EXTENDED RELEASE ORAL
Qty: 90 TABLET | Refills: 3 | Status: SHIPPED | OUTPATIENT
Start: 2019-01-07 | End: 2019-07-08

## 2019-01-07 NOTE — TELEPHONE ENCOUNTER
Edin Warren from [address] [place] [address] [address] is calling in regarding Tamara's prescription for Vitamin D. The prescription is for 2,000 units and the insurance will only cover for 50,000 units.  Edin Warren states that Britta Leal stated she will not pay for OTC 2,000 units of Vitam Yes

## 2019-01-07 NOTE — TELEPHONE ENCOUNTER
Last Office Visit: 9-28-18 with TB for follow up  Last Rx Filled: 12-10-18 30 tabs with no refills   Last Labs: 12-18-18 hga1c/cmp  Future Appointment: 1-18-19    Per protocol to provider

## 2019-01-10 RX ORDER — LOSARTAN POTASSIUM 50 MG/1
TABLET ORAL
Qty: 90 TABLET | Refills: 0 | Status: SHIPPED | OUTPATIENT
Start: 2019-01-10 | End: 2019-03-22

## 2019-01-14 RX ORDER — OMEPRAZOLE 20 MG/1
CAPSULE, DELAYED RELEASE ORAL
Qty: 90 CAPSULE | Refills: 1 | Status: SHIPPED | OUTPATIENT
Start: 2019-01-14 | End: 2019-07-08

## 2019-01-14 RX ORDER — SITAGLIPTIN 50 MG/1
TABLET, FILM COATED ORAL
Qty: 30 TABLET | Refills: 10 | Status: SHIPPED | OUTPATIENT
Start: 2019-01-14 | End: 2019-07-08

## 2019-01-15 NOTE — TELEPHONE ENCOUNTER
LOV: 9/28/18 w/ TB  FOV: 1/18/19 MWV  Last labs: 12/18/18 A1C,CMP  Last Refill: 8/1/18 qt:90 1 refill    Per protocol sent to provider

## 2019-01-18 ENCOUNTER — TELEPHONE (OUTPATIENT)
Dept: INTERNAL MEDICINE CLINIC | Facility: CLINIC | Age: 74
End: 2019-01-18

## 2019-01-18 NOTE — TELEPHONE ENCOUNTER
Pt called via perfect serve to cxl appt for the same day. After further research refills where given the beginning of this week and last appt was in Sept 2018.

## 2019-01-22 NOTE — TELEPHONE ENCOUNTER
Spoke with pt and r/s appt to   Future Appointments   Date Time Provider Prashant Irma   2/11/2019  3:00 PM Tiara Jurado MD [facility]   5/13/2019  2:40 PM Darral Osler, APN [facility] [address]

## 2019-02-07 ENCOUNTER — TELEPHONE (OUTPATIENT)
Dept: INTERNAL MEDICINE CLINIC | Facility: CLINIC | Age: 74
End: 2019-02-07

## 2019-02-07 NOTE — TELEPHONE ENCOUNTER
Received order form from [employer] for repair services. [facility] [place] for review and signature.

## 2019-02-11 ENCOUNTER — TELEPHONE (OUTPATIENT)
Dept: INTERNAL MEDICINE CLINIC | Facility: CLINIC | Age: 74
End: 2019-02-11

## 2019-02-11 NOTE — TELEPHONE ENCOUNTER
[facility] called to state they could not provide transportation to get the patient here for her wellness visit today at 3:00-pt will call back to r/s

## 2019-02-12 NOTE — TELEPHONE ENCOUNTER
Future Appointments   Date Time Provider Prashant Irma   3/22/2019  2:45 PM Jesse Aldana MD [facility]

## 2019-02-14 ENCOUNTER — TELEPHONE (OUTPATIENT)
Dept: INTERNAL MEDICINE CLINIC | Facility: CLINIC | Age: 74
End: 2019-02-14

## 2019-02-14 NOTE — TELEPHONE ENCOUNTER
LOV 9/28/18  Received order form from [address] [place] [address] for urinary incontinence supplies. TB bin for review and signature.

## 2019-03-08 NOTE — TELEPHONE ENCOUNTER
Last Office Visit: 9-28-18 with TB for follow up  Last Rx Filled: historical   Last Labs: 12-18-18 hga1c/cmp  Future Appointment: 3-22-19    Per protocol to provider

## 2019-03-11 RX ORDER — ACETAMINOPHEN 500 MG
TABLET ORAL 3 TIMES DAILY PRN
Qty: 180 TABLET | Refills: 1 | Status: SHIPPED | OUTPATIENT
Start: 2019-03-11 | End: 2019-03-22

## 2019-03-11 RX ORDER — ASPIRIN 81 MG
TABLET, DELAYED RELEASE (ENTERIC COATED) ORAL
Qty: 90 TABLET | Refills: 1 | Status: SHIPPED | OUTPATIENT
Start: 2019-03-11 | End: 2019-07-08

## 2019-03-11 NOTE — TELEPHONE ENCOUNTER
Last Office Visit: 9-28-18 with TB for follow up  Last Rx Filled:   Mupirocin 11-12-18 22g with no refills   Aspirin historical   Last Labs: 12-18-18 cmp/hga1c  Future Appointment: 3-22-19     Per protocol to provider

## 2019-03-12 RX ORDER — ASPIRIN 81 MG
TABLET, DELAYED RELEASE (ENTERIC COATED) ORAL
Qty: 30 TABLET | Refills: 11 | OUTPATIENT
Start: 2019-03-12

## 2019-03-18 NOTE — TELEPHONE ENCOUNTER
Loratadine hasn't been filled in office    Please advise,    LOV:9/28/18 TB  FOV:3/2/19  LAST RX:  Lyrica 12/17/18 100 mg take 1 cap three times a day 90 caps 2 refills   LAST LABS:12/18/18 a1c,cmp  PER PROTOCOL: to provider

## 2019-03-19 RX ORDER — LORATADINE 10 MG/1
TABLET ORAL
Qty: 30 TABLET | Refills: 10 | Status: SHIPPED | OUTPATIENT
Start: 2019-03-19 | End: 2019-07-08

## 2019-03-22 ENCOUNTER — OFFICE VISIT (OUTPATIENT)
Dept: INTERNAL MEDICINE CLINIC | Facility: CLINIC | Age: 74
End: 2019-03-22
Payer: MEDICARE

## 2019-03-22 VITALS — RESPIRATION RATE: 18 BRPM | DIASTOLIC BLOOD PRESSURE: 64 MMHG | SYSTOLIC BLOOD PRESSURE: 124 MMHG | HEART RATE: 84 BPM

## 2019-03-22 DIAGNOSIS — E11.42 CONTROLLED TYPE 2 DIABETES MELLITUS WITH DIABETIC POLYNEUROPATHY, WITHOUT LONG-TERM CURRENT USE OF INSULIN (HCC): ICD-10-CM

## 2019-03-22 DIAGNOSIS — F32.A CHRONIC DEPRESSION: ICD-10-CM

## 2019-03-22 DIAGNOSIS — Z12.11 SCREEN FOR COLON CANCER: ICD-10-CM

## 2019-03-22 DIAGNOSIS — I10 BENIGN ESSENTIAL HTN: ICD-10-CM

## 2019-03-22 DIAGNOSIS — G60.0 CHARCOT-MARIE-TOOTH DISEASE: ICD-10-CM

## 2019-03-22 DIAGNOSIS — E53.8 B12 DEFICIENCY: ICD-10-CM

## 2019-03-22 DIAGNOSIS — G47.33 OSA ON CPAP: ICD-10-CM

## 2019-03-22 DIAGNOSIS — Z99.89 OSA ON CPAP: ICD-10-CM

## 2019-03-22 DIAGNOSIS — Z00.00 ENCOUNTER FOR ANNUAL HEALTH EXAMINATION: Primary | ICD-10-CM

## 2019-03-22 DIAGNOSIS — Z12.31 ENCOUNTER FOR SCREENING MAMMOGRAM FOR MALIGNANT NEOPLASM OF BREAST: ICD-10-CM

## 2019-03-22 PROCEDURE — G0439 PPPS, SUBSEQ VISIT: HCPCS | Performed by: INTERNAL MEDICINE

## 2019-03-22 RX ORDER — FLUOXETINE 10 MG/1
30 CAPSULE ORAL EVERY MORNING
COMMUNITY
Start: 2019-03-11 | End: 2019-07-08

## 2019-03-22 RX ORDER — CLOTRIMAZOLE 1 %
CREAM (GRAM) TOPICAL
Qty: 60 G | Refills: 11 | OUTPATIENT
Start: 2019-03-22

## 2019-03-22 NOTE — TELEPHONE ENCOUNTER
Refill was sent on 7/5/18 qt:60 g w/ 10 refills to [facility]'s pharmacy too soon for refill.

## 2019-03-22 NOTE — PROGRESS NOTES
HPI:   Carolina Smith is a 74year old female who presents for a Medicare Subsequent Annual Wellness visit (Pt already had Initial Annual Wellness).      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 She has difficulties Shopping for Groceries based on screening of functional status.    Shop for groceries: Cannot do without help         She has Hearing problems b obesity (BMI 35.0-39. 9)     Incontinence     Hx of right BKA ([facility]     At risk for falling     Obsessive compulsive disorder     History of amputation of finger     Vitamin D deficiency     Diabetes mellitus, controlled ([facility]     Chronic depression     [place] BY MOUTH DAILY   PURIFIED WATER Oral Liquid USE AS DIRECTED FOR CPAP EQUIPMENT   ALLOPURINOL 300 MG Oral Tab TAKE ONE TABLET BY MOUTH EVERY MORNING   TraMADol HCl 50 MG Oral Tab TAKE ONE TABLET BY MOUTH EVERY 6 HOUR AS NEEDED FOR PAIN   HYDROCORTISONE 2.5 9-30-16 / RIGHT THUMB / BAM  (5/31/2016), Anxiety, 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 (6/29/2012), Charcot-Rima-Tooth disease, Depression, Diabetes ([facility] Disorder of thyroid, Environmental allergies, Essential hyp Nares normal, septum midline,mucosa normal, no drainage or sinus tenderness   Throat: Lips, mucosa, and tongue normal; teeth and gums normal   Neck: Supple, symmetrical, trachea midline   Back:   Symmetric   Lungs:   Clear to auscultation bilaterally, resp INTERNAL    Benign essential HTN- controlled, CPM    Controlled type 2 diabetes mellitus with diabetic polyneuropathy, without long-term current use of insulin ([facility]- eye exam from Dr. Kailey Woods requested, hgba1c at goal, CPM  -     HEMOGLOBIN A1C; Future Colonoscopy Screen every 10 years Colonoscopy due on 12[birth year]23/1945 Update [facility] Maintenance if applicable    Flex Sigmoidoscopy Screen every 10 years No results found for this or any previous visit. No flowsheet data found.      Fecal Occult Blood Annually No prescription benefits, but Medicare does not cover unless Medically needed    Zoster  Not covered by Medicare Part B No vaccine history found This may be covered with your pharmacy  prescription benefits      SPECIFIC DISEASE MONITORING Internal Lab or Pro

## 2019-03-22 NOTE — PATIENT INSTRUCTIONS
Jesusita Funez's SCREENING SCHEDULE   Tests on this list are recommended by your physician but may not be covered, or covered at this frequency, by your insurer. Please check with your insurance carrier before scheduling to verify coverage.    PREVENTATI smoked more than 100 cigarettes in their lifetime   • Anyone with a family history    Colorectal Cancer Screening  Covered up to Age 76     Colonoscopy Screen   Covered every 10 years- more often if abnormal Colonoscopy due on 12/23/1945 [facility] Main Influenza  Covered Annually No orders found for this or any previous visit.  Please get every year    Pneumococcal 13 (Prevnar)  Covered Once after 65 Orders placed or performed in visit on 10/24/16   • PNEUMOCOCCAL VACC, 13 ANTONI IM    Please get once after

## 2019-03-25 NOTE — TELEPHONE ENCOUNTER
Last Office Visit: 3-22-1 with TB for cpe   Last Rx Filled: 10-11-18 120 tabs with no refills   Last Labs: 12-18-18 cmp/hga1c  Future Appointment: none    Per protocol to provider

## 2019-03-25 NOTE — TELEPHONE ENCOUNTER
Last Office Visit: 3-22-19 with TB for cpe  Last Rx Filled: 7-5-18 60g with 10 reflls   Last Labs: 12-18-18 cmp/hga1c  Future Appointment: none    Per protocol to provider

## 2019-03-26 RX ORDER — TRAMADOL HYDROCHLORIDE 50 MG/1
TABLET ORAL
Qty: 120 TABLET | Refills: 4 | OUTPATIENT
Start: 2019-03-26 | End: 2019-07-08

## 2019-03-26 RX ORDER — CLOTRIMAZOLE 1 %
CREAM (GRAM) TOPICAL
Qty: 60 G | Refills: 10 | Status: SHIPPED | OUTPATIENT
Start: 2019-03-26 | End: 2019-06-17 | Stop reason: CLARIF

## 2019-04-01 RX ORDER — ALLOPURINOL 300 MG/1
TABLET ORAL
Qty: 90 TABLET | Refills: 1 | Status: SHIPPED | OUTPATIENT
Start: 2019-04-01 | End: 2019-07-08

## 2019-04-01 NOTE — TELEPHONE ENCOUNTER
LOV: 3/22/19 w/ TB for MWV  FOV: None  Last labs: 12/28/18 A1C,CMP  Last Refill: 10/29/18 qt:90 1 refill    Per protocol routed to provider

## 2019-04-04 ENCOUNTER — TELEPHONE (OUTPATIENT)
Dept: INTERNAL MEDICINE CLINIC | Facility: CLINIC | Age: 74
End: 2019-04-04

## 2019-04-04 NOTE — TELEPHONE ENCOUNTER
[email or web address] and curve cushion. TB bin for review and signature.

## 2019-04-05 NOTE — TELEPHONE ENCOUNTER
Signed order faxed back to Leodan @[phone] as requested. Received fax confirmation.    Sent to scan

## 2019-04-09 RX ORDER — ACETAMINOPHEN 500 MG
TABLET ORAL
Qty: 180 TABLET | Refills: 0 | Status: SHIPPED | OUTPATIENT
Start: 2019-04-09 | End: 2019-06-17 | Stop reason: CLARIF

## 2019-04-09 NOTE — TELEPHONE ENCOUNTER
Last Office Visit: 3-22-19 with TB for cpe   Last Rx Filled: historical   Last Labs: 12-18-18 hga1c/cmp  Future Appointment: none    Per protocol to provider

## 2019-04-11 RX ORDER — PRAVASTATIN SODIUM 20 MG
TABLET ORAL
Qty: 30 TABLET | Refills: 11 | Status: SHIPPED | OUTPATIENT
Start: 2019-04-11 | End: 2019-07-08

## 2019-04-11 NOTE — TELEPHONE ENCOUNTER
Protocol failed due to Lipid panel within past 12 months    Please advise,    LOV:3/22/19 TB  FOV:none on file   LAST RX:5/5/18 20 mg take 1 tab at bedtime 90 3 refills   LAST LABS:12/18/18 a1c,cmp  PER PROTOCOL: to provider

## 2019-04-15 RX ORDER — LOSARTAN POTASSIUM 50 MG/1
TABLET ORAL
Qty: 90 TABLET | Refills: 1 | Status: SHIPPED | OUTPATIENT
Start: 2019-04-15 | End: 2019-07-08

## 2019-05-31 ENCOUNTER — TELEPHONE (OUTPATIENT)
Dept: INTERNAL MEDICINE CLINIC | Facility: CLINIC | Age: 74
End: 2019-05-31

## 2019-05-31 DIAGNOSIS — R05.9 COUGH: Primary | ICD-10-CM

## 2019-05-31 DIAGNOSIS — R09.89 CHEST RALES: ICD-10-CM

## 2019-05-31 NOTE — TELEPHONE ENCOUNTER
Called and spoke with Fredy vanegas indicated pt c/o congestion, runny nose and cough. RN listened to lungs noting fine rales in left lower lobe. Requesting CXR order. Spoke with MASOUD javed for order.  CXR ordered and faxed to [facility] as requested ATTN: T

## 2019-05-31 NOTE — TELEPHONE ENCOUNTER
Pt is not feeling fine. Calling to request an xray order from TB.    bp:127/59 temp: 99.9 pulse: 95 respiration:20  Please advise

## 2019-06-01 ENCOUNTER — HOSPITAL ENCOUNTER (INPATIENT)
Facility: HOSPITAL | Age: 74
LOS: 5 days | Discharge: HOME HEALTH | DRG: 177 | End: 2019-06-06
Attending: EMERGENCY MEDICINE | Admitting: INTERNAL MEDICINE
Payer: MEDICARE

## 2019-06-01 ENCOUNTER — APPOINTMENT (OUTPATIENT)
Dept: GENERAL RADIOLOGY | Facility: HOSPITAL | Age: 74
DRG: 177 | End: 2019-06-01
Attending: EMERGENCY MEDICINE
Payer: MEDICARE

## 2019-06-01 DIAGNOSIS — R09.02 HYPOXIA: ICD-10-CM

## 2019-06-01 DIAGNOSIS — J98.01 ACUTE BRONCHOSPASM: ICD-10-CM
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
## 2019-06-01 DIAGNOSIS — J18.9 NOSOCOMIAL PNEUMONIA: Primary | ICD-10-CM

## 2019-06-01 DIAGNOSIS — A41.9 SEPSIS, DUE TO UNSPECIFIED ORGANISM: ICD-10-CM

## 2019-06-01 PROCEDURE — 5A09357 ASSISTANCE WITH RESPIRATORY VENTILATION, LESS THAN 24 CONSECUTIVE HOURS, CONTINUOUS POSITIVE AIRWAY PRESSURE: ICD-10-PCS | Performed by: INTERNAL MEDICINE

## 2019-06-01 PROCEDURE — 99223 1ST HOSP IP/OBS HIGH 75: CPT | Performed by: INTERNAL MEDICINE

## 2019-06-01 PROCEDURE — 71045 X-RAY EXAM CHEST 1 VIEW: CPT | Performed by: EMERGENCY MEDICINE

## 2019-06-01 RX ORDER — DEXTROSE MONOHYDRATE 25 G/50ML
50 INJECTION, SOLUTION INTRAVENOUS
Status: DISCONTINUED | OUTPATIENT
Start: 2019-06-01 | End: 2019-06-06

## 2019-06-01 RX ORDER — IPRATROPIUM BROMIDE AND ALBUTEROL SULFATE 2.5; .5 MG/3ML; MG/3ML
3 SOLUTION RESPIRATORY (INHALATION) ONCE
Status: COMPLETED | OUTPATIENT
Start: 2019-06-01 | End: 2019-06-01

## 2019-06-01 RX ORDER — ACETAMINOPHEN 325 MG/1
650 TABLET ORAL EVERY 6 HOURS PRN
Status: DISCONTINUED | OUTPATIENT
Start: 2019-06-01 | End: 2019-06-06

## 2019-06-01 RX ORDER — METOCLOPRAMIDE HYDROCHLORIDE 5 MG/ML
10 INJECTION INTRAMUSCULAR; INTRAVENOUS EVERY 8 HOURS PRN
Status: DISCONTINUED | OUTPATIENT
Start: 2019-06-01 | End: 2019-06-06

## 2019-06-01 RX ORDER — TRAZODONE HYDROCHLORIDE 50 MG/1
50 TABLET ORAL NIGHTLY
Status: DISCONTINUED | OUTPATIENT
Start: 2019-06-01 | End: 2019-06-06

## 2019-06-01 RX ORDER — ASPIRIN 81 MG/1
81 TABLET ORAL DAILY
Status: DISCONTINUED | OUTPATIENT
Start: 2019-06-01 | End: 2019-06-06

## 2019-06-01 RX ORDER — PANTOPRAZOLE SODIUM 20 MG/1
20 TABLET, DELAYED RELEASE ORAL
Status: DISCONTINUED | OUTPATIENT
Start: 2019-06-02 | End: 2019-06-06

## 2019-06-01 RX ORDER — HEPARIN SODIUM 5000 [USP'U]/ML
7500 INJECTION, SOLUTION INTRAVENOUS; SUBCUTANEOUS EVERY 8 HOURS SCHEDULED
Status: DISCONTINUED | OUTPATIENT
Start: 2019-06-01 | End: 2019-06-06

## 2019-06-01 RX ORDER — PREDNISONE 20 MG/1
40 TABLET ORAL
Status: DISCONTINUED | OUTPATIENT
Start: 2019-06-02 | End: 2019-06-06

## 2019-06-01 RX ORDER — ONDANSETRON 2 MG/ML
4 INJECTION INTRAMUSCULAR; INTRAVENOUS EVERY 6 HOURS PRN
Status: DISCONTINUED | OUTPATIENT
Start: 2019-06-01 | End: 2019-06-06

## 2019-06-01 RX ORDER — ALLOPURINOL 300 MG/1
300 TABLET ORAL DAILY
Status: DISCONTINUED | OUTPATIENT
Start: 2019-06-01 | End: 2019-06-06

## 2019-06-01 RX ORDER — FLUOXETINE HYDROCHLORIDE 20 MG/1
60 CAPSULE ORAL DAILY
Status: DISCONTINUED | OUTPATIENT
Start: 2019-06-01 | End: 2019-06-06

## 2019-06-01 RX ORDER — ACETAMINOPHEN 500 MG
1000 TABLET ORAL ONCE
Status: COMPLETED | OUTPATIENT
Start: 2019-06-01 | End: 2019-06-01

## 2019-06-01 RX ORDER — PRAZOSIN HYDROCHLORIDE 5 MG/1
5 CAPSULE ORAL NIGHTLY
Status: DISCONTINUED | OUTPATIENT
Start: 2019-06-01 | End: 2019-06-01

## 2019-06-01 RX ORDER — LOSARTAN POTASSIUM 50 MG/1
50 TABLET ORAL DAILY
Status: DISCONTINUED | OUTPATIENT
Start: 2019-06-01 | End: 2019-06-06

## 2019-06-01 RX ORDER — PRAVASTATIN SODIUM 20 MG
20 TABLET ORAL NIGHTLY
Status: DISCONTINUED | OUTPATIENT
Start: 2019-06-01 | End: 2019-06-06

## 2019-06-01 RX ORDER — PREGABALIN 100 MG/1
100 CAPSULE ORAL 3 TIMES DAILY
Status: DISCONTINUED | OUTPATIENT
Start: 2019-06-01 | End: 2019-06-06

## 2019-06-01 RX ORDER — IPRATROPIUM BROMIDE AND ALBUTEROL SULFATE 2.5; .5 MG/3ML; MG/3ML
3 SOLUTION RESPIRATORY (INHALATION) EVERY 4 HOURS PRN
Status: DISCONTINUED | OUTPATIENT
Start: 2019-06-01 | End: 2019-06-03

## 2019-06-01 RX ORDER — BUSPIRONE HYDROCHLORIDE 5 MG/1
20 TABLET ORAL 2 TIMES DAILY
Status: DISCONTINUED | OUTPATIENT
Start: 2019-06-01 | End: 2019-06-06

## 2019-06-01 RX ORDER — PRAZOSIN HYDROCHLORIDE 5 MG/1
5 CAPSULE ORAL NIGHTLY
Status: DISCONTINUED | OUTPATIENT
Start: 2019-06-01 | End: 2019-06-01 | Stop reason: SDUPTHER

## 2019-06-01 RX ORDER — METHYLPREDNISOLONE SODIUM SUCCINATE 125 MG/2ML
125 INJECTION, POWDER, LYOPHILIZED, FOR SOLUTION INTRAMUSCULAR; INTRAVENOUS ONCE
Status: COMPLETED | OUTPATIENT
Start: 2019-06-01 | End: 2019-06-01

## 2019-06-01 NOTE — SEPSIS REASSESSMENT
[facility]    Sepsis Reassessment Note    /72   Pulse 89   Temp 99.3 °F (37.4 °C) (Oral)   Resp 26   Ht 175.3 cm (5' 9.02\")   Wt (!) 136.1 kg   SpO2 96%   BMI 44.29 kg/m²      11:48 AM    Cardiac:  Regularity: Regular  Rate: Normal  Heart Alexa

## 2019-06-01 NOTE — CONSULTS
[facility] Progress Note:  Anticoagulation Weight Dose Adjustment for heparin    Harris Form is a 74year old female who has been prescribed heparin for VTE prophylaxis. Estimated Creatinine Clearance: 54.6 mL/min (based on SCr of 0.93 mg/dL).

## 2019-06-01 NOTE — H&P
[facility] HOSPITALIST  History and Physical     [address] Patient Status:  Emergency    1945 MRN [id number]   Location [address] [facility] Attending Filippo [facility], [address] [facility] Day # 0 PCP Karen Mejia MD     Chief Complaint: fev Social History:  reports that she has never smoked. She has never used smokeless tobacco. She reports that she does not drink alcohol or use drugs.     Family History:   Family History   Problem Relation Age of Onset   • Other (cadia arrest) Marylin Friday 5   ASPIR-LOW 81 MG Oral Tab EC TAKE ONE TABLET BY MOUTH EVERY MORNING Disp: 90 tablet Rfl: 1   MUPIROCIN 2 % External Ointment APPLY TO OPEN AREA ON LEG 2 TIMES A DAY AS NEEDED Disp: 22 g Rfl: 10   JANUVIA 50 MG Oral Tab TAKE ONE TABLET BY MOUTH EVERY DAY Take 5 mg by mouth nightly. Disp:  Rfl:    TraZODone HCl 50 MG Oral Tab Take 50 mg by mouth nightly. Disp:  Rfl:        Review of Systems:   A comprehensive 14 point review of systems was completed. Pertinent positives and negatives noted in the HPI. HTN  1. Continue home meds  5. Dyslipidemia  1. statin  6. Morbid Obesity  1. BMI 44  2. Advised weight loss  7. DM2  1.  SSI    Quality:  · DVT Prophylaxis: HSQ  · CODE status: Full  · Beth: none    Plan of care discussed with patient, ED Physician     Ar

## 2019-06-01 NOTE — ED PROVIDER NOTES
Patient Seen in: [facility] Emergency Department    History   Patient presents with:  Pneumonia  Fever (infectious)    Stated Complaint: fever, pneumonia    HPI    40-year-old female presents emergency room with chief complaint of shortness of breat TONSILLECTOMY             Social History    Tobacco Use      Smoking status: Never Smoker      Smokeless tobacco: Never Used    Alcohol use: No    Drug use: No      Review of Systems    Positive for stated complaint: fever, pneumonia  Other systems are as REFLEX - Abnormal; Notable for the following components:    Urine Color Carline (*)     Ketones Urine 20  (*)     Protein Urine 100  (*)     Leukocyte Esterase Urine Trace (*)     WBC Urine 5-10 (*)     Squamous Epi.  Cells Moderate (*)     Hyaline Casts Pres there is expiratory wheezing present. Symptoms exam are consistent with pneumonia with acute bronchospasm, patient had blood culture performed and did receive IV antibiotics. Discussed with pulmonary  and DR Painter.      Admission disposition:

## 2019-06-01 NOTE — CONSULTS
[address] Patient Status:  Inpatient    1945 MRN [id number]   [facility] 5NW-A Attending Alex Leroy MD   Hosp Day # 0 PCP Alize Pickard MD     Date of Admission: 2019 10:27 AM  Admission Diagnosis: left great toe    • APPENDECTOMY     • CHOLECYSTECTOMY     • FINGER AMPUTATION/ EXTRA DIGIT REMOVAL Right 6/2/2016    Performed by Niles Crigler, MD at [facility] MAIN OR   • TONSILLECTOMY          Adhesive Tape             Aspartame                 Clindamyci g Rfl: 10   FLUoxetine HCl 10 MG Oral Cap  Disp:  Rfl:    LYRICA 100 MG Oral Cap TAKE ONE CAPSULE BY MOUTH 3 TIMES A DAY Disp: 90 capsule Rfl: 5   ASPIR-LOW 81 MG Oral Tab EC TAKE ONE TABLET BY MOUTH EVERY MORNING Disp: 90 tablet Rfl: 1   JANUVIA 50 MG Ora glucose (DEX4) oral liquid 30 g, 30 g, Oral, Q15 Min PRN **OR** Glucose-Vitamin C (DEX-4) 4-6 GM-MG chewable tab 8 tablet, 8 tablet, Oral, Q15 Min PRN  •  Piperacillin Sod-Tazobactam So (ZOSYN) 4.5 g in dextrose 5 % 100 mL ADD-vantage, 4.5 g, Intravenous, numbness, weakness, ataxia, tremors, or vertigo  Psychiatric: denies insomnia, depression, anxiety, or drug abuse  Endocrine: denies polydypsia, polyuria, cold/heat intolerance  Hematologic/lymphatic: denies easy bruising, new blood clots, or lymphedema  A RDW 14.8 06/01/2019    .0 06/01/2019     Lab Results   Component Value Date     06/01/2019    K 3.8 06/01/2019     06/01/2019    CO2 29.0 06/01/2019    BUN 19 06/01/2019    CREATSERUM 0.93 06/01/2019     06/01/2019    CA 8.8 06/01

## 2019-06-01 NOTE — ED INITIAL ASSESSMENT (HPI)
LLL pneumonia dx yesterday, temp 100.5 non productive cough. No chest pain or nausea. 77% on RA.   94% with duoneb and 98% on 15L

## 2019-06-01 NOTE — CONSULTS
[facility] Pharmacy Note: Antimicrobial Weight Dose Adjustment for: piperacillin/tazobactam (Otilio Beltran    Phoebe Aguirre is a 74year old female who has been prescribed piperacillin/tazobactam (ZOSYN) 3750 mg every 8 hours.   CrCl is estimated creatinine clearance i

## 2019-06-01 NOTE — ED NOTES
Attempted to call report to the floor. RN unable to take report at this time and will call this RN back.

## 2019-06-01 NOTE — PROGRESS NOTES
Pt arrived from ER, she is alert and oriented, lungs are coarse and wheezy. 025 L NC, RA is baseline. Non-prod cough. Tele . Zosyn IV, right leg amputee, missing finger on left hand, generalized pain. pts IV keeps beeping, this writer offered to change IV an

## 2019-06-02 PROCEDURE — 99232 SBSQ HOSP IP/OBS MODERATE 35: CPT | Performed by: INTERNAL MEDICINE

## 2019-06-02 RX ORDER — TRAMADOL HYDROCHLORIDE 50 MG/1
50 TABLET ORAL EVERY 6 HOURS PRN
Status: DISCONTINUED | OUTPATIENT
Start: 2019-06-02 | End: 2019-06-06

## 2019-06-02 NOTE — PLAN OF CARE
Problem: Diabetes/Glucose Control  Goal: Glucose maintained within prescribed range  Description  INTERVENTIONS:  - Monitor Blood Glucose as ordered  - Assess for signs and symptoms of hyperglycemia and hypoglycemia  - Administer ordered medications to m Problem: SAFETY ADULT - FALL  Goal: Free from fall injury  Description  INTERVENTIONS:  - Assess pt frequently for physical needs  - Identify cognitive and physical deficits and behaviors that affect risk of falls.   - [place] fall precautions as indica hygiene including cough, deep breathe, Incentive Spirometry  - Assess the need for suctioning and perform as needed  - Assess and instruct to report SOB or any respiratory difficulty  - Respiratory Therapy support as indicated  - Manage/alleviate anxiety lift transfer. Iv abx. Pt updated w/ poc. Will continue to monitor.

## 2019-06-02 NOTE — PROGRESS NOTES
[facility] HOSPITALIST  Progress Note     Oscar Farmer Patient Status:  Inpatient    1945 MRN [id number]   [facility] 5NW-A Attending Alex Leroy MD   Hosp Day # 1 PCP Alize Pickard MD     Chief Complaint: cough, PNA    S: Jorge Alberto Lair Epic.    Medications:   • piperacillin-tazobactam  4.5 g Intravenous Q8H   • Heparin Sodium (Porcine)  7,500 Units Subcutaneous Q8H [facility]   • Insulin Aspart Pen  1-5 Units Subcutaneous TID CC and HS   • allopurinol  300 mg Oral Daily   • busPIRone HCl  20 mg

## 2019-06-02 NOTE — PHYSICAL THERAPY NOTE
PHYSICAL THERAPY EVALUATION - INPATIENT     Room Number: 450/640-L  Evaluation Date: 6/2/2019  Type of Evaluation: Initial  Physician Order: PT Eval and Treat    Presenting Problem: pneumonia  Reason for Therapy: Mobility Dysfunction and Discharge Pl 6/2/2016    Performed by Chelsy Mayer MD at [address]  Type of Home: Other (Comment)([place] [address] [facility])   Home Layout: One level                Lives With: Alone(staff present)     Patient [place]   How much help from another person does the patient currently need. ..   -   Moving to and from a bed to a chair (including a wheelchair)?: A Lot   -   Need to walk in hospital room?: Total   -   Climbing 3-5 steps with a railing?: Total       AM-PA above deficits to assist patient in returning to prior to level of function. DISCHARGE RECOMMENDATIONS  PT Discharge Recommendations: Home with home health PT    PLAN  PT Treatment Plan: Bed mobility; Body mechanics; Endurance; Energy conservation;Patient ed

## 2019-06-02 NOTE — PROGRESS NOTES
[address] Patient Status:  Inpatient    1945 MRN [id number]   [facility] 5NW-A Attending Pinky Blunt MD   Hosp Day # 1 PCP Willam Warren MD     SUBJECTIVE: Pt complains of poor sleep overnight, states t tab 50 mg, 50 mg, Oral, Daily  •  pregabalin (LYRICA) cap 100 mg, 100 mg, Oral, TID  •  Pravastatin Sodium (PRAVACHOL) tab 20 mg, 20 mg, Oral, Nightly  •  Pantoprazole Sodium (PROTONIX) EC tab 20 mg, 20 mg, Oral, QAM AC  •  traZODone HCl (DESYREL) tab 50 m zosyn  -wean O2 as able  -PO steroids (day 1 of pred 40mg)   -BD protocol   2. Pneumonia:  -RVP and urine strep Ag negative  -follow up on sputum culture  -f/u urine legionella  -empiric zosyn  3. ABDIAZIZ:  -continue CPAP  4.  Dispo:  -will follow     Mera Deleon

## 2019-06-02 NOTE — CM/SW NOTE
06/02/19 0900   CM/SW Screening   Referral Source    Information Source Chart review;Nursing rounds   Patient's Mental Status Alert;Oriented   Patient's [address] [facility] [place] Supportiv   Patient lives wit

## 2019-06-02 NOTE — PLAN OF CARE
Problem: Diabetes/Glucose Control  Goal: Glucose maintained within prescribed range  Description  INTERVENTIONS:  - Monitor Blood Glucose as ordered  - Assess for signs and symptoms of hyperglycemia and hypoglycemia  - Administer ordered medications to m Progressing     Problem: SAFETY ADULT - FALL  Goal: Free from fall injury  Description  INTERVENTIONS:  - Assess pt frequently for physical needs  - Identify cognitive and physical deficits and behaviors that affect risk of falls.   - [place] fall precaut broncho-pulmonary hygiene including cough, deep breathe, Incentive Spirometry  - Assess the need for suctioning and perform as needed  - Assess and instruct to report SOB or any respiratory difficulty  - Respiratory Therapy support as indicated  - Manage/a activity/tolerance for mobility and gait  - Educate and engage patient/family in tolerated activity level and precautions  - Up to chair x3 daily   Outcome: Progressing       Pt aox4, maintaining O2 sats on 2L (baseline= room air). Tele-NSR. Voids. R BRITTANY.

## 2019-06-02 NOTE — RESPIRATORY THERAPY NOTE
ABDIAZIZ - Equipment Use Daily Summary:                  . Set Mode: CPAP                . Usage in hours: 0:17                . 90% Pressure (EPAP) level: 12                . 90% Insp. Pressure (IPAP): Heather Spar AHI: 3.4                .  Supplemental Oxy

## 2019-06-03 ENCOUNTER — TELEPHONE (OUTPATIENT)
Dept: INTERNAL MEDICINE CLINIC | Facility: CLINIC | Age: 74
End: 2019-06-03

## 2019-06-03 PROCEDURE — 99232 SBSQ HOSP IP/OBS MODERATE 35: CPT | Performed by: INTERNAL MEDICINE

## 2019-06-03 RX ORDER — IPRATROPIUM BROMIDE AND ALBUTEROL SULFATE 2.5; .5 MG/3ML; MG/3ML
3 SOLUTION RESPIRATORY (INHALATION)
Status: DISCONTINUED | OUTPATIENT
Start: 2019-06-03 | End: 2019-06-03

## 2019-06-03 RX ORDER — IPRATROPIUM BROMIDE AND ALBUTEROL SULFATE 2.5; .5 MG/3ML; MG/3ML
3 SOLUTION RESPIRATORY (INHALATION)
Status: DISCONTINUED | OUTPATIENT
Start: 2019-06-03 | End: 2019-06-06

## 2019-06-03 NOTE — TELEPHONE ENCOUNTER
LOV 3/22/19  Called and spoke with [facility] to inform ok for [facility] orders. Natasha Taylor verbalized understanding and agreed with POC.

## 2019-06-03 NOTE — PLAN OF CARE
Pt is A+Ox4. VSS on 2LNC. Baseline is RA. ABDIAZIZ/CPAP. . IS. NEBS. PO prednisone. Tele- NSR/ST. EP. Heparin. Refusing SCDs. Voids via bedpan. Up with total lift. Pt is WC at baseline. Has R BKA and multiple digit amputations. C/O pain to back, PRNs given. unsuccessful or patient reports new pain  - Anticipate increased pain with activity and pre-medicate as appropriate  Outcome: Progressing     Problem: RISK FOR INFECTION - ADULT  Goal: Absence of fever/infection during anticipated neutropenic period  Descr ADULT  Goal: Achieves optimal ventilation and oxygenation  Description  INTERVENTIONS:  - Assess for changes in respiratory status  - Assess for changes in mentation and behavior  - Position to facilitate oxygenation and minimize respiratory effort  - Oxyg mobilization  - Obtain PT/OT consults as needed  - Advance activity as appropriate  - Communicate ordered activity level and limitations with patient/family  Outcome: Progressing     Problem: Impaired Functional Mobility  Goal: Achieve highest/safest level

## 2019-06-03 NOTE — PROGRESS NOTES
RN notified by Tele tech that patient \"might be in afib\". EKG done and shows NSR with PVCs and occasional PACs. MD notified. MAYRA.

## 2019-06-03 NOTE — CM/SW NOTE
Chart reviewed. PT recommendations for HHC/home PT noted. [facility] liaison met with pt to offer Renettaangegibson Catina with agency choice. Pt agreeable to [facility]. Pt requesting [facility] staff she has had in the past. Anticipate return to [place] [phone] with [facility] upon discharge.    So

## 2019-06-03 NOTE — PROGRESS NOTES
[facility] HOSPITALIST  Progress Note     Laith Byrne Patient Status:  Inpatient    1945 MRN [id number]   [facility] 5NW-A Attending Angela Ojeda MD   Hosp Day # 2 PCP Gabriel Lanier MD     Chief Complaint: cough, PNA    S: Thermon Murtaza Imaging: Imaging data reviewed in Epic.     Medications:   • piperacillin-tazobactam  4.5 g Intravenous Q8H   • Heparin Sodium (Porcine)  7,500 Units Subcutaneous Q8H [address]   • Insulin Aspart Pen  1-5 Units Subcutaneous TID CC and HS   • allopurinol  300

## 2019-06-03 NOTE — PROGRESS NOTES
[address] Patient Status:  Inpatient    1945 MRN [id number]   [facility] 5NW-A Attending Adelaide Saeed MD   Hosp Day # 2 PCP Quinten Cuadra MD     SUBJECTIVE:  Pt states that she slept better overnight.   B mg, Oral, Daily  •  FLUoxetine HCl (PROZAC) cap 60 mg, 60 mg, Oral, Daily  •  losartan Potassium (COZAAR) tab 50 mg, 50 mg, Oral, Daily  •  pregabalin (LYRICA) cap 100 mg, 100 mg, Oral, TID  •  Pravastatin Sodium (PRAVACHOL) tab 20 mg, 20 mg, Oral, Nightly

## 2019-06-03 NOTE — OCCUPATIONAL THERAPY NOTE
IP OT attempted, pt politely refusing stating fatigue. Pt states she just returned to bed. Will re-attempt as schedule permits.

## 2019-06-03 NOTE — PROGRESS NOTES
Multidisciplinary Discharge Rounds held 6/3/2019. Treatment team members present today include , , Charge Nurse, Nurse, RT, PT and Pharmacy caring for [employer]      Other care providers present:    Mobility Goal: Stand an

## 2019-06-03 NOTE — PLAN OF CARE
Problem: pneumonia, fevers  Data: Patient alert and oriented overnight, report mild generalized pain. Patient on 3L O2 per NC to maintain saturation >90%. Patient voiding per bed pan, occasional stress incontinence. Vital signs stable, afebrile.    Action: - FALL  Goal: Free from fall injury  Description  INTERVENTIONS:  - Assess pt frequently for physical needs  - Identify cognitive and physical deficits and behaviors that affect risk of falls. - [place] fall precautions as indicated by assessment.   - Ed

## 2019-06-03 NOTE — PLAN OF CARE
Patient saturating 95% on 2L Nasal Cannula. Improved aeration after nebulizer. Will continue to assess and monitor.

## 2019-06-03 NOTE — RESPIRATORY THERAPY NOTE
Assessment and Objective  \"Progressing\"  INTERVENTIONS:  - Assess for changes in respiratory status  - Assess for changes in mentation and behavior  - Position to facilitate oxygenation and minimize respiratory effort  - Oxygen supplementation based on o

## 2019-06-03 NOTE — RESPIRATORY THERAPY NOTE
ABDIAZIZ : EQUIPMENT USE: DAILY SUMMARY                                            SET MODE: AUTO CPAP WITH CFLEX                                          USAGE IN HOURS: 8:43                                          90

## 2019-06-03 NOTE — PHYSICAL THERAPY NOTE
IP PT attempted, pt politely refusing stating fatigue. Pt states she just returned to bed. Will re-attempt as schedule permits.

## 2019-06-03 NOTE — PROGRESS NOTES
06/03/19 1303   Clinical Encounter Type   Visited With Patient   Patient's Supportive Strategies/Resources Father Mayra Wilson provided prayer, Scripture, support and Sacrament of the Sick.     Sacramental Encounters   Sacrament of Sick-Anointing Anointed

## 2019-06-04 PROCEDURE — 99232 SBSQ HOSP IP/OBS MODERATE 35: CPT | Performed by: INTERNAL MEDICINE

## 2019-06-04 RX ORDER — GUAIFENESIN 600 MG
600 TABLET, EXTENDED RELEASE 12 HR ORAL 2 TIMES DAILY
Status: DISCONTINUED | OUTPATIENT
Start: 2019-06-04 | End: 2019-06-06

## 2019-06-04 RX ORDER — CLOTRIMAZOLE 1 %
CREAM (GRAM) TOPICAL 2 TIMES DAILY
Status: DISCONTINUED | OUTPATIENT
Start: 2019-06-04 | End: 2019-06-06

## 2019-06-04 NOTE — PLAN OF CARE
Pt is A+Ox4. VSS on 1LNC. Baseline is RA. Seems to be more wheezy today, pt states she seems more SOB. ABDIAZIZ/CPAP. . IS. NEBS. PO prednisone. Tele- NSR/ EP. Heparin. Refusing SCDs. Voids via bedpan. Up with total lift. Pt is WC at baseline.  Has ROBBIE SOTO and/or relaxation techniques  - Monitor for opioid side effects  - Notify MD/LIP if interventions unsuccessful or patient reports new pain  - Anticipate increased pain with activity and pre-medicate as appropriate  Outcome: Progressing     Problem: RISK FO cognitive ability or social support system  Outcome: Progressing     Problem: RESPIRATORY - ADULT  Goal: Achieves optimal ventilation and oxygenation  Description  INTERVENTIONS:  - Assess for changes in respiratory status  - Assess for changes in Levine, Susan. \Hospital Has a New Name and Outlook.\"" handling equipment as needed  - Ensure adequate protection for wounds/incisions during mobilization  - Obtain PT/OT consults as needed  - Advance activity as appropriate  - Communicate ordered activity level and limitations with patient/family  Outcome: Pr

## 2019-06-04 NOTE — RESPIRATORY THERAPY NOTE
ABDIAZIZ Equipment Usage Summary :            Set Mode :AUTO CPAP W FLEX          Usage in Hours:3;48          90% Pressure (EPAP) : 9.3           90% Insp Pressure (IPAP);           AHI : 1.4          Supplemental Oxygen :      LPM

## 2019-06-04 NOTE — PLAN OF CARE
Assumed care for patient at 2300. Patient is A/Ox4, complains of pain to RT foot. Tylenol given and pain is reassessed. CPAP overnight. LT BKA dressing re-applied to abrasions. Vitals stable. Will continue to monitor.      Problem: [address] assistance with activity based on assessment  - Modify environment to reduce risk of injury  - Provide assistive devices as appropriate  - Consider OT/PT consult to assist with strengthening/mobility  - Encourage toileting schedule  Outcome: Progressing

## 2019-06-04 NOTE — PROGRESS NOTES
Multidisciplinary Discharge Rounds held 6/4/2019. Treatment team members present today include , , Charge Nurse, Nurse, RT, PT and Pharmacy caring for [employer]      Other care providers present:    Mobility Goal: WC    Re

## 2019-06-04 NOTE — PROGRESS NOTES
[facility] HOSPITALIST  Progress Note     Diane Stephenson Patient Status:  Inpatient    1945 MRN [id number]   [facility] 5NW-A Attending Fabienne Goodwin MD   Hosp Day # 3 PCP Petr Gregory MD     Chief Complaint: cough, PNA    S: Den Anthony Imaging: Imaging data reviewed in Epic.     Medications:   • ipratropium-albuterol  3 mL Nebulization QID   • piperacillin-tazobactam  4.5 g Intravenous Q8H   • Heparin Sodium (Porcine)  7,500 Units Subcutaneous Q8H [address]   • Insulin Aspart Pen  1-5 Units

## 2019-06-04 NOTE — PROGRESS NOTES
[address] Patient Status:  Inpatient    1945 MRN [id number]   [facility] 5NW-A Attending Valerio Melissa MD   Hosp Day # 3 PCP Jose Murray MD     SUBJECTIVE:  Pt complains of panic attacks.   Continues to tab 81 mg, 81 mg, Oral, Daily  •  FLUoxetine HCl (PROZAC) cap 60 mg, 60 mg, Oral, Daily  •  losartan Potassium (COZAAR) tab 50 mg, 50 mg, Oral, Daily  •  pregabalin (LYRICA) cap 100 mg, 100 mg, Oral, TID  •  Pravastatin Sodium (PRAVACHOL) tab 20 mg, 20 mg, oxygen prior to discharge, anticipate discharge in next 24-48 hours if continues to improve

## 2019-06-05 PROCEDURE — 99232 SBSQ HOSP IP/OBS MODERATE 35: CPT | Performed by: INTERNAL MEDICINE

## 2019-06-05 RX ORDER — PREDNISONE 20 MG/1
20 TABLET ORAL DAILY
Qty: 5 TABLET | Refills: 0 | Status: SHIPPED | OUTPATIENT
Start: 2019-06-05 | End: 2019-06-10

## 2019-06-05 RX ORDER — IPRATROPIUM BROMIDE AND ALBUTEROL SULFATE 2.5; .5 MG/3ML; MG/3ML
3 SOLUTION RESPIRATORY (INHALATION) EVERY 4 HOURS PRN
Qty: 50 VIAL | Refills: 0 | Status: SHIPPED | OUTPATIENT
Start: 2019-06-05 | End: 2019-12-10

## 2019-06-05 RX ORDER — AMOXICILLIN AND CLAVULANATE POTASSIUM 500; 125 MG/1; MG/1
1 TABLET, FILM COATED ORAL 3 TIMES DAILY
Qty: 6 TABLET | Refills: 0 | Status: SHIPPED | OUTPATIENT
Start: 2019-06-05 | End: 2019-06-07

## 2019-06-05 NOTE — PLAN OF CARE
Problem: Diabetes/Glucose Control  Goal: Glucose maintained within prescribed range  Description  INTERVENTIONS:  - Monitor Blood Glucose as ordered  - Assess for signs and symptoms of hyperglycemia and hypoglycemia  - Administer ordered medications to m anticipated neutropenic period  Description  INTERVENTIONS  - Monitor WBC  - Administer growth factors as ordered  - Implement neutropenic guidelines  Outcome: Progressing     Problem: SAFETY ADULT - FALL  Goal: Free from fall injury  Description  INTERVEN minimize respiratory effort  - Oxygen supplementation based on oxygen saturation or ABGs  - Provide Smoking Cessation handout, if applicable  - Encourage broncho-pulmonary hygiene including cough, deep breathe, Incentive Spirometry  - Assess the need for s Achieve highest/safest level of mobility/gait  Description  Interventions:  - Assess patient's functional ability and stability  - Promote increasing activity/tolerance for mobility and gait  - Educate and engage patient/family in tolerated activity level

## 2019-06-05 NOTE — DISCHARGE SUMMARY
[facility] [place] HOSPITALIST  DISCHARGE SUMMARY     Beti Manriquez Patient Status:  Inpatient    1945 MRN [id number]   [facility] 5NW-A Attending Tae Moody MD   Hosp Day # 5 PCP Clelia Soulier, MD     Date of Admission: 2019  Date o 5 tablet  Refills:  0        CONTINUE taking these medications      Instructions Prescription details   Albuterol Sulfate  (90 Base) MCG/ACT Aers  Commonly known as:  PROAIR HFA      Inhale 2 puffs into the lungs every 4 (four) hours as needed for W hydrocortisone 2.5 % Crea      APPLY TO AFFECTED AREA 2 TIMES A DAY - USE TOGETHER WITH LOTRIMIN CREAM (CLOTRIMAZOLE)   Quantity:  28 g  Refills:  0     JANUVIA 50 MG Tabs  Generic drug:  SITagliptin Phosphate      TAKE ONE TABLET BY MOUTH EVERY DAY   Chirag Rizvi 0     Zolpidem Tartrate 5 MG Tabs  Commonly known as:  AMBIEN       Refills:  0        ASK your doctor about these medications      Instructions Prescription details   Amoxicillin-Pot Clavulanate 500-125 MG Tabs  Commonly known as:  AUGMENTIN  Ask about: S

## 2019-06-05 NOTE — PROGRESS NOTES
[address] Patient Status:  Inpatient    1945 MRN [id number]   [facility] 5NW-A Attending Anna Fine MD   Hosp Day # 4 PCP Trey Wheat MD     SUBJECTIVE:   Pt feels well this morning.   Still on her CP 300 mg, 300 mg, Oral, Daily  •  busPIRone HCl (BUSPAR) tab 20 mg, 20 mg, Oral, BID  •  aspirin EC tab 81 mg, 81 mg, Oral, Daily  •  FLUoxetine HCl (PROZAC) cap 60 mg, 60 mg, Oral, Daily  •  losartan Potassium (COZAAR) tab 50 mg, 50 mg, Oral, Daily  •  preg 5)  -mucinex  3. ABDIAZIZ:  -continue CPAP  4. Anxiety attacks: suspect this is exacerbated by steroid use  -will wean steroids as soon as clinically able, plan for one additional day of 40mg then 20mg x 5 days then off  -treatment per primary  5.  Dispo:  -will

## 2019-06-05 NOTE — PLAN OF CARE
Patient is A/Ox4, no complaints of pain. 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 Patient has stress incontinence. Briefed. CPAP, tele NSR with PVCs. Receiving IV ABX. Will continue to monitor.      Problem: Diabetes/Glucose Control  Goal: Glucos AND ELECTROLYTES - ADULT  Goal: Glucose maintained within prescribed range  Description  INTERVENTIONS:  - Monitor Blood Glucose as ordered  - Assess for signs and symptoms of hyperglycemia and hypoglycemia  - Administer ordered medications to maintain glu

## 2019-06-05 NOTE — OCCUPATIONAL THERAPY NOTE
OCCUPATIONAL THERAPY EVALUATION - INPATIENT     Room Number: 303/310-I  Evaluation Date: 6/4/2019  Type of Evaluation: Initial  Presenting Problem: PNA    Physician Order: IP Consult to Occupational Therapy  Reason for Therapy: ADL/IADL Dysfunction and Dis at [facility] SITUATION  Type of Home: Other (Comment)([place] [address] [facility])  Home Layout: One level  Lives With: Alone(staff present)    Toilet and Equipment: Toilet riser  Shower/Tub and Equ rinsing, drying)?: A Lot  -   Toileting, which includes using toilet, bedpan or urinal? : A Lot  -   Putting on and taking off regular upper body clothing?: A Lot  -   Taking care of personal grooming such as brushing teeth?: A Little  -   Eating meals?: N work, leisure and social participation. Pt is highly motivated to return to her [facility] apt. Recommend HHOT to assess for equipment needs once D/Ok home.      The patient is functioning below her previous functional level and would benefit from skilled inpatie Exercise Program Goal  Patient will be modified independent with bilateral AROM HEP (home exercise program).

## 2019-06-05 NOTE — RESPIRATORY THERAPY NOTE
ABDIAZIZ - Equipment Use Daily Summary:                  . Set Mode:AUTO CPAP WITH C-FLEX                . Usage in hours: 5:06                . 90% Pressure (EPAP) level: 7.0                . 90% Insp. Pressure (IPAP): Surjit Petra AHI: 1.8                .

## 2019-06-05 NOTE — PHYSICAL THERAPY NOTE
PHYSICAL THERAPY TREATMENT NOTE - INPATIENT    Room Number: 190/710-U     Session: 1   Number of Visits to Meet Established Goals: 3    Presenting Problem: pneumonia    Problem List  Principal Problem:    Nosocomial pneumonia  Active Problems:    Type 2 d ASSESSMENT   Ratin          BALANCE                                                                                                                     Static Sitting: Fair  Dynamic Sitting: Fair -           Static Standing: Not tested  Dynamic Standin met;Call light within reach;RN aware of session/findings; All patient questions and concerns addressed(in w/c)    ASSESSMENT   Pt continues to present with impaired strength , endurance and balance below PLOF and will continue to benefit from ongoing IP PT

## 2019-06-05 NOTE — PROGRESS NOTES
[facility] HOSPITALIST  Progress Note     Tito Swann Patient Status:  Inpatient    1945 MRN [id number]   [facility] 5NW-A Attending Esmer Alvares MD   Hosp Day # 4 PCP Michelle Garza MD     Chief Complaint: cough, PNA    S: Betha Valerie • guaiFENesin ER  600 mg Oral BID   • clotrimazole   Topical BID   • hydrocortisone   Topical BID   • ipratropium-albuterol  3 mL Nebulization QID   • piperacillin-tazobactam  4.5 g Intravenous Q8H   • Heparin Sodium (Porcine)  7,500 Units Subcutaneous Q

## 2019-06-05 NOTE — PLAN OF CARE
Multidisciplinary Discharge Rounds held 6/5/2019. Treatment team members present today include , , Charge Nurse, Nurse, RT, PT and Pharmacy caring for [employer]      Other care providers present: [facility] AT [facility] RN Liaison    Mobility

## 2019-06-06 VITALS
SYSTOLIC BLOOD PRESSURE: 159 MMHG | RESPIRATION RATE: 18 BRPM | OXYGEN SATURATION: 97 % | HEIGHT: 69.02 IN | WEIGHT: 293 LBS | DIASTOLIC BLOOD PRESSURE: 66 MMHG | HEART RATE: 64 BPM | TEMPERATURE: 97 F | BODY MASS INDEX: 43.4 KG/M2

## 2019-06-06 PROCEDURE — 99239 HOSP IP/OBS DSCHRG MGMT >30: CPT | Performed by: INTERNAL MEDICINE

## 2019-06-06 RX ORDER — PREDNISONE 20 MG/1
20 TABLET ORAL
Status: DISCONTINUED | OUTPATIENT
Start: 2019-06-07 | End: 2019-06-06

## 2019-06-06 RX ORDER — MORPHINE SULFATE 4 MG/ML
2 INJECTION, SOLUTION INTRAMUSCULAR; INTRAVENOUS ONCE
Status: COMPLETED | OUTPATIENT
Start: 2019-06-06 | End: 2019-06-06

## 2019-06-06 RX ORDER — TRAMADOL HYDROCHLORIDE 50 MG/1
50 TABLET ORAL ONCE
Status: COMPLETED | OUTPATIENT
Start: 2019-06-06 | End: 2019-06-06

## 2019-06-06 NOTE — OCCUPATIONAL THERAPY NOTE
OCCUPATIONAL THERAPY TREATMENT NOTE - INPATIENT     Room Number: 413/214-W  Session: 1   Number of Visits to Meet Established Goals: 5    Presenting Problem: PNA    History related to current admission: Pt is 74year old female admitted on 6/1/2019 from  there is more than one way to skin a cat. \" re: opening toothpaste tube     Patient self-stated goal is none identified     OBJECTIVE  Precautions:  Other (Comment)(R BRITTANY)    WEIGHT BEARING RESTRICTION  Weight Bearing Restriction: None                PAIN A concerns addressed    ASSESSMENT      Patient is a 74year old female admitted on 6/1/2019 for PNA. Complete medical history and occupational profile noted above. Functional outcome measures completed include AM-PAC.  In this OT evaluation patient presents modified independent     UE Exercise Program Goal  Patient will be modified independent with bilateral AROM HEP (home exercise program).

## 2019-06-06 NOTE — PROGRESS NOTES
[facility] H[facility]PITAL[facility]  Progress Note     Bharathi Ewing Patient Status:  Inpatient    1945 MRN [id number]   [facility] 5NW-A Attending Pinky Blunt MD   Hosp Day # 5 PCP Willam Warren MD     Chief Complaint: cough, PNA    S: Terra Rula INR 1.21*       No results for input(s): TROP, CK in the last 168 hours. Imaging: Imaging data reviewed in Epic.     Medications:   • guaiFENesin ER  600 mg Oral BID   • clotrimazole   Topical BID   • hydrocortisone   Topical BID   • ipratropium-a state of illness, I anticipate that, after discharge, patient will require TBD.

## 2019-06-06 NOTE — CM/SW NOTE
Patient to dc to [address] [place] [address] Rd [address]. [facility] requested for 5 pm . PCS form on chart.      Puneet Mccarthy RN   [address]  [phone]

## 2019-06-06 NOTE — PLAN OF CARE
Problem: Diabetes/Glucose Control  Goal: Glucose maintained within prescribed range  Description  INTERVENTIONS:  - Monitor Blood Glucose as ordered  - Assess for signs and symptoms of hyperglycemia and hypoglycemia  - Administer ordered medications to m Discharge     Problem: RISK FOR INFECTION - ADULT  Goal: Absence of fever/infection during anticipated neutropenic period  Description  INTERVENTIONS  - Monitor WBC  - Administer growth factors as ordered  - Implement neutropenic guidelines  Outcome: Adequ changes in respiratory status  - Assess for changes in mentation and behavior  - Position to facilitate oxygenation and minimize respiratory effort  - Oxygen supplementation based on oxygen saturation or ABGs  - Provide Smoking Cessation handout, if applic appropriate  - Communicate ordered activity level and limitations with patient/family  Outcome: Adequate for Discharge     Problem: Impaired Functional Mobility  Goal: Achieve highest/safest level of mobility/gait  Description  Interventions:  - Assess pat

## 2019-06-06 NOTE — PLAN OF CARE
Patient is A/Ox4. Complaints of pain to RT BKA. Patient given PRN medication and pain is reassessed. Patient had continual pain and additional medication ordered and given. Vitals are stable. Dressing to BKA changed, elevated on pillow.  IV ABX given per or Implement non-pharmacological measures as appropriate and evaluate response  - Consider cultural and social influences on pain and pain management  - Manage/alleviate anxiety  - Utilize distraction and/or relaxation techniques  - Monitor for opioid side ef

## 2019-06-06 NOTE — PLAN OF CARE
NURSING DISCHARGE NOTE    Discharged [address] home via Ambulance. Accompanied by Support staff  Belongings Taken by patient/family. PIV removed. Discharge navigator complete. Discharge instructions reviewed with patient.  Report given to

## 2019-06-06 NOTE — RESPIRATORY THERAPY NOTE
ABDIAZIZ Equipment Usage Summary :            Set Mode :AUTO CPAP W FLEX          Usage in Hours:7;26          90% Pressure (EPAP) : 7.10          90% Insp Pressure (IPAP);           AHI : 0.5          Supplemental Oxygen :    2  LPM

## 2019-06-06 NOTE — PROGRESS NOTES
[address] Patient Status:  Inpatient    1945 MRN [id number]   [facility] 5NW-A Attending Allyson Wallace MD   [facility] Day # 5 PCP Alec Dotson MD     Pulm / Critical Care Progress Note     S: Pt states she is cooperative. No respiratory distress. Head: Normocephalic, without obvious abnormality, atraumatic. Lungs: ctab   Chest wall: No tenderness or deformity. Heart: Regular rate and rhythm, normal S1S2, no murmur.    Abdomen: soft, non-tender, non-disten

## 2019-06-06 NOTE — PLAN OF CARE
Multidisciplinary Discharge Rounds held 6/6/2019. Treatment team members present today include , , Charge Nurse, Nurse, RT, PT and Pharmacy caring for [employer]      Other care providers present: [facility] AT [facility] RN Liaison    Mobility

## 2019-06-07 ENCOUNTER — PATIENT OUTREACH (OUTPATIENT)
Dept: CASE MANAGEMENT | Age: 74
End: 2019-06-07

## 2019-06-07 DIAGNOSIS — Z02.9 ENCOUNTERS FOR UNSPECIFIED ADMINISTRATIVE PURPOSE: ICD-10-CM

## 2019-06-07 NOTE — CM/SW NOTE
Patient discharged on 6/6/19 as previously planned.        06/07/19 0800   Discharge disposition   Expected discharge disposition Home-Health   Name of Facillity/Home Care/Hospice Residential   Additional Home Care/Hospice Provider Teodora beckett

## 2019-06-07 NOTE — PROGRESS NOTES
ISSA LMTCB for TCM. Provided [facility] CB ph#[phone].   TCM HFU at [facility] 6/11/19 3:00pm.

## 2019-06-17 ENCOUNTER — OFFICE VISIT (OUTPATIENT)
Dept: INTERNAL MEDICINE CLINIC | Facility: CLINIC | Age: 74
End: 2019-06-17
Payer: MEDICARE

## 2019-06-17 VITALS
HEART RATE: 76 BPM | TEMPERATURE: 99 F | RESPIRATION RATE: 18 BRPM | BODY MASS INDEX: 44 KG/M2 | SYSTOLIC BLOOD PRESSURE: 128 MMHG | OXYGEN SATURATION: 94 % | WEIGHT: 293 LBS | DIASTOLIC BLOOD PRESSURE: 66 MMHG

## 2019-06-17 DIAGNOSIS — J98.01 ACUTE BRONCHOSPASM: ICD-10-CM

## 2019-06-17 DIAGNOSIS — J18.9 COMMUNITY ACQUIRED PNEUMONIA OF LEFT LOWER LOBE OF LUNG: Primary | ICD-10-CM

## 2019-06-17 DIAGNOSIS — E11.42 CONTROLLED TYPE 2 DIABETES MELLITUS WITH DIABETIC POLYNEUROPATHY, WITHOUT LONG-TERM CURRENT USE OF INSULIN (HCC): ICD-10-CM

## 2019-06-17 PROCEDURE — 1111F DSCHRG MED/CURRENT MED MERGE: CPT | Performed by: HOSPITALIST

## 2019-06-17 PROCEDURE — 99495 TRANSJ CARE MGMT MOD F2F 14D: CPT | Performed by: HOSPITALIST

## 2019-06-17 NOTE — PROGRESS NOTES
TCM VISIT  [facility] CLINIC      HISTORY     CHIEF COMPLAINT: Pneumonia    HPI: Clorinda Burkitt is a 74year old female here today for hospital follow up for pneumonia and bronchospasm.   Clorinda Burkitt was discharged from [facility] Rfl: 4   CLOTRIMAZOLE 1 % External Cream APPLY TO AFFECTED AREA 2 TIMES A DAY - USE WITH HYDROCORTISONE CREAM Disp: 60 g Rfl: 10   FLUoxetine HCl 10 MG Oral Cap  Disp:  Rfl:    ALLERGY RELIEF 10 MG Oral Tab TAKE ONE TABLET BY MOUTH DAILY Disp: 30 tablet Rf Take 60 mg by mouth daily. Pt takes 40mg with 20mg = 60mg once daily Disp:  Rfl:    FLUoxetine HCl 40 MG Oral Cap Take 60 mg by mouth daily.  Pt takes 40mg with 20mg = 60mg once daily Disp:  Rfl:    Acetaminophen (MAPAP) 500 MG Oral Cap Take 500-1,000 mg by • Other (cadiac arrest) Paternal Grandmother    • Other (cardiac arrest) Paternal Grandfather    • Cancer Father    • Arthritis Father    • Heart Disease Father    • Other (cardiac arrest) Father    • Cancer Mother    • Arthritis Mother    • Heart Diseas palpitations   GI: denies abdominal pain, melena, hematochezia, constipation, diarrhea, dysphagia, heartburn, hemorrhoids, nausea, vomiting   MUSCULOSKELETAL: denies pain, numbness or tingling of extremeties, states normal range of motion of extremities  N PRAVASTATIN SODIUM 20 MG Oral Tab TAKE ONE TABLET BY MOUTH AT BEDTIME Disp: 30 tablet Rfl: 11   ALLOPURINOL 300 MG Oral Tab TAKE ONE TABLET BY MOUTH EVERY MORNING Disp: 90 tablet Rfl: 1   TRAMADOL HCL 50 MG Oral Tab TAKE ONE TABLET BY MOUTH EVERY 6 HOUR every 8 (eight) hours as needed for Fever. Disp:  Rfl:    Prazosin HCl 5 MG Oral Cap Take 5 mg by mouth nightly. Disp:  Rfl:    TraZODone HCl 50 MG Oral Tab Take 50 mg by mouth nightly.    Disp:  Rfl:      cyanocobalamin (VITAMIN B12) 1000 MCG/ML injection of discharge to 30 days:   · Number of Possible Diagnoses and/or Management Options: moderate  · Amount and/or Complexity of Data to Be Reviewed: moderate  · Risk of Significant Complications, Morbidity, and/or Mortality: moderate    Overall Risk:   modera

## 2019-06-17 NOTE — TELEPHONE ENCOUNTER
LFV: 3/22/19 with TB  Future Appt: 6/17/19 TCC   Last Rx: 4/9/19 for 180 tablets  Last Labs:6/2/19 bmp and cbc  Per protocol to provider

## 2019-06-17 NOTE — PROGRESS NOTES
[facility] PHARMACIST MEDICATION RECONCILIATION        Carolina Smith MRN [id number]    1945 PCP Trey Wheat MD       Comments: Medication history completed in [address] [place] [address] by pharmacist.     The following medication WITH HYDROCORTISONE CREAM   • BusPIRone HCl 10 MG Oral Tab Take 20 mg by mouth 2 (two) times daily. • Calcium Carbonate-Vitamin D (OYSTER SHELL CALCIUM/D) 500-200 MG-UNIT Oral Tab Take 1 tablet by mouth 2 (two) times daily.      • FLUoxetine HCl 20 MG Ora

## 2019-06-18 RX ORDER — ACETAMINOPHEN 500 MG
TABLET ORAL
Qty: 180 TABLET | Refills: 0 | Status: SHIPPED | OUTPATIENT
Start: 2019-06-18 | End: 2019-07-08

## 2019-06-25 ENCOUNTER — TELEPHONE (OUTPATIENT)
Dept: INTERNAL MEDICINE CLINIC | Facility: CLINIC | Age: 74
End: 2019-06-25

## 2019-06-25 NOTE — TELEPHONE ENCOUNTER
[facility] F/U appt for the pt but she is unable to make that   Future Appointments                 7/2/2019  3:15 PM Rosetta Stallings MD [facility] [address] [facility]       [address] [place]     Please call and r/s

## 2019-06-28 ENCOUNTER — APPOINTMENT (OUTPATIENT)
Dept: CT IMAGING | Facility: HOSPITAL | Age: 74
End: 2019-06-28
Attending: EMERGENCY MEDICINE
Payer: MEDICARE

## 2019-06-28 ENCOUNTER — HOSPITAL ENCOUNTER (EMERGENCY)
Facility: HOSPITAL | Age: 74
Discharge: HOME | End: 2019-06-28
Attending: EMERGENCY MEDICINE
Payer: MEDICARE

## 2019-06-28 VITALS
BODY MASS INDEX: 43.4 KG/M2 | OXYGEN SATURATION: 97 % | HEART RATE: 81 BPM | HEIGHT: 69 IN | SYSTOLIC BLOOD PRESSURE: 145 MMHG | TEMPERATURE: 97 F | RESPIRATION RATE: 18 BRPM | WEIGHT: 293 LBS | DIASTOLIC BLOOD PRESSURE: 60 MMHG

## 2019-06-28 DIAGNOSIS — N30.00 ACUTE CYSTITIS WITHOUT HEMATURIA: ICD-10-CM

## 2019-06-28 DIAGNOSIS — S39.012A STRAIN OF LUMBAR REGION, INITIAL ENCOUNTER: Primary | ICD-10-CM

## 2019-06-28 PROCEDURE — 80053 COMPREHEN METABOLIC PANEL: CPT | Performed by: EMERGENCY MEDICINE

## 2019-06-28 PROCEDURE — 87186 SC STD MICRODIL/AGAR DIL: CPT | Performed by: EMERGENCY MEDICINE

## 2019-06-28 PROCEDURE — 85730 THROMBOPLASTIN TIME PARTIAL: CPT | Performed by: EMERGENCY MEDICINE

## 2019-06-28 PROCEDURE — 99284 EMERGENCY DEPT VISIT MOD MDM: CPT

## 2019-06-28 PROCEDURE — 74176 CT ABD & PELVIS W/O CONTRAST: CPT | Performed by: EMERGENCY MEDICINE

## 2019-06-28 PROCEDURE — 87086 URINE CULTURE/COLONY COUNT: CPT | Performed by: EMERGENCY MEDICINE

## 2019-06-28 PROCEDURE — 96374 THER/PROPH/DIAG INJ IV PUSH: CPT

## 2019-06-28 PROCEDURE — 87088 URINE BACTERIA CULTURE: CPT | Performed by: EMERGENCY MEDICINE

## 2019-06-28 PROCEDURE — 85025 COMPLETE CBC W/AUTO DIFF WBC: CPT | Performed by: EMERGENCY MEDICINE

## 2019-06-28 PROCEDURE — 81001 URINALYSIS AUTO W/SCOPE: CPT | Performed by: EMERGENCY MEDICINE

## 2019-06-28 PROCEDURE — 85610 PROTHROMBIN TIME: CPT | Performed by: EMERGENCY MEDICINE

## 2019-06-28 RX ORDER — MORPHINE SULFATE 4 MG/ML
1 INJECTION, SOLUTION INTRAMUSCULAR; INTRAVENOUS ONCE
Status: COMPLETED | OUTPATIENT
Start: 2019-06-28 | End: 2019-06-28

## 2019-06-28 RX ORDER — NITROFURANTOIN 25; 75 MG/1; MG/1
100 CAPSULE ORAL 2 TIMES DAILY
Qty: 20 CAPSULE | Refills: 0 | Status: SHIPPED | OUTPATIENT
Start: 2019-06-28 | End: 2019-07-08

## 2019-06-28 NOTE — ED PROVIDER NOTES
Patient Seen in: [facility] Emergency Department    History   Patient presents with:  Back Pain (musculoskeletal)    Stated Complaint: Back Pain    HPI    Patient is 68-year-old female who presents emergency room with a history of going from [employer] finger   • AMPUTATE METACARPAL+FINGER Right     tip of right middle finger    • AMPUTATION LOW LEG THRU TIB/FIB  7/2009    R leg below knee   • AMPUTATION METATARSAL+TOE,SINGLE Left     left great toe    • APPENDECTOMY     • CHOLECYSTECTOMY     • FINGER AM spine with no evidence of any focal spinous process tenderness to palpation appreciated. There is no rash or ecchymosis in the low back appreciated. EXTREMITIES: There is no cyanosis, clubbing, or edema appreciated.  Pulses are 1+ but equal in all 4 extre DRAW BLUE   RAINBOW DRAW LAVENDER   RAINBOW DRAW LIGHT GREEN   RAINBOW DRAW GOLD   URINE CULTURE, ROUTINE          Ct Abdomen+pelvis Kidneystone 2d Rndr(no Iv,no Oral)(cpt=74176)    Result Date: 6/28/2019  CONCLUSION:  1.  No renal, ureteral, or bladder kit encounter diagnosis)  Acute cystitis without hematuria    Disposition:  Discharge  6/28/2019  6:40 pm    Follow-up:  Renate Watson MD   [facility] [address]  [phone]    Call in 2 days  please call for follow up this week

## 2019-06-28 NOTE — ED INITIAL ASSESSMENT (HPI)
Pt presented from [facility] supportive living c/o lower back pain believes it's related to coughing from previous pnuemonia.

## 2019-07-01 RX ORDER — CIPROFLOXACIN 500 MG/1
500 TABLET, FILM COATED ORAL 2 TIMES DAILY
Qty: 14 TABLET | Refills: 0 | Status: ON HOLD | OUTPATIENT
Start: 2019-07-01 | End: 2019-07-10

## 2019-07-01 NOTE — TELEPHONE ENCOUNTER
Called pt to inform, per TB, sent alternative abx Ciprofloxacin HCl 500 MG to pharmacy listed as requested. Pt verbalized understanding and agreed with POC.

## 2019-07-01 NOTE — TELEPHONE ENCOUNTER
Spoke with pt stating doing ok at this time. Scheduled HFU on 7/12 as requested per pt's availability. Pt indicating was seen in ER on 6/28/19- dx with UTI. Given Macrobid, however, this med puts pt at increased severe to moderate risk of CMT.    Pt request

## 2019-07-08 ENCOUNTER — APPOINTMENT (OUTPATIENT)
Dept: GENERAL RADIOLOGY | Facility: HOSPITAL | Age: 74
DRG: 603 | End: 2019-07-08
Attending: EMERGENCY MEDICINE
Payer: MEDICARE

## 2019-07-08 ENCOUNTER — HOSPITAL ENCOUNTER (INPATIENT)
Facility: HOSPITAL | Age: 74
LOS: 1 days | Discharge: HOME HEALTH | DRG: 603 | End: 2019-07-10
Attending: EMERGENCY MEDICINE | Admitting: HOSPITALIST
Payer: MEDICARE

## 2019-07-08 DIAGNOSIS — L03.116 CELLULITIS OF LEFT LOWER EXTREMITY: ICD-10-CM

## 2019-07-08 DIAGNOSIS — N30.00 ACUTE CYSTITIS WITHOUT HEMATURIA: Primary | ICD-10-CM

## 2019-07-08 LAB
ALBUMIN SERPL-MCNC: 3.7 G/DL (ref 3.4–5)
ALBUMIN/GLOB SERPL: 0.9 {RATIO} (ref 1–2)
ALP LIVER SERPL-CCNC: 96 U/L (ref 55–142)
ALT SERPL-CCNC: 21 U/L (ref 13–56)
ANION GAP SERPL CALC-SCNC: 6 MMOL/L (ref 0–18)
AST SERPL-CCNC: 48 U/L (ref 15–37)
ATRIAL RATE: 90 BPM
BASOPHILS # BLD AUTO: 0.07 X10(3) UL (ref 0–0.2)
BASOPHILS NFR BLD AUTO: 0.9 %
BILIRUB SERPL-MCNC: 0.6 MG/DL (ref 0.1–2)
BILIRUB UR QL STRIP.AUTO: NEGATIVE
BUN BLD-MCNC: 18 MG/DL (ref 7–18)
BUN/CREAT SERPL: 18.9 (ref 10–20)
CALCIUM BLD-MCNC: 9.8 MG/DL (ref 8.5–10.1)
CHLORIDE SERPL-SCNC: 104 MMOL/L (ref 98–112)
CO2 SERPL-SCNC: 30 MMOL/L (ref 21–32)
COLOR UR AUTO: YELLOW
CREAT BLD-MCNC: 0.95 MG/DL (ref 0.55–1.02)
DEPRECATED RDW RBC AUTO: 53 FL (ref 35.1–46.3)
EOSINOPHIL # BLD AUTO: 0.09 X10(3) UL (ref 0–0.7)
EOSINOPHIL NFR BLD AUTO: 1.1 %
ERYTHROCYTE [DISTWIDTH] IN BLOOD BY AUTOMATED COUNT: 15.6 % (ref 11–15)
EST. AVERAGE GLUCOSE BLD GHB EST-MCNC: 143 MG/DL (ref 68–126)
GLOBULIN PLAS-MCNC: 4.2 G/DL (ref 2.8–4.4)
GLUCOSE BLD-MCNC: 108 MG/DL (ref 70–99)
GLUCOSE BLD-MCNC: 132 MG/DL (ref 70–99)
GLUCOSE BLD-MCNC: 133 MG/DL (ref 70–99)
GLUCOSE BLD-MCNC: 137 MG/DL (ref 70–99)
GLUCOSE BLD-MCNC: 188 MG/DL (ref 70–99)
GLUCOSE BLD-MCNC: 216 MG/DL (ref 70–99)
GLUCOSE UR STRIP.AUTO-MCNC: NEGATIVE MG/DL
HBA1C MFR BLD HPLC: 6.6 % (ref ?–5.7)
HCT VFR BLD AUTO: 49.2 % (ref 35–48)
HGB BLD-MCNC: 15.7 G/DL (ref 12–16)
IMM GRANULOCYTES # BLD AUTO: 0.05 X10(3) UL (ref 0–1)
IMM GRANULOCYTES NFR BLD: 0.6 %
LACTATE SERPL-SCNC: 1.3 MMOL/L (ref 0.4–2)
LYMPHOCYTES # BLD AUTO: 0.95 X10(3) UL (ref 1–4)
LYMPHOCYTES NFR BLD AUTO: 12 %
M PROTEIN MFR SERPL ELPH: 7.9 G/DL (ref 6.4–8.2)
MCH RBC QN AUTO: 29.7 PG (ref 26–34)
MCHC RBC AUTO-ENTMCNC: 31.9 G/DL (ref 31–37)
MCV RBC AUTO: 93.2 FL (ref 80–100)
MONOCYTES # BLD AUTO: 0.68 X10(3) UL (ref 0.1–1)
MONOCYTES NFR BLD AUTO: 8.6 %
NEUTROPHILS # BLD AUTO: 6.08 X10 (3) UL (ref 1.5–7.7)
NEUTROPHILS # BLD AUTO: 6.08 X10(3) UL (ref 1.5–7.7)
NEUTROPHILS NFR BLD AUTO: 76.8 %
NITRITE UR QL STRIP.AUTO: NEGATIVE
OSMOLALITY SERPL CALC.SUM OF ELEC: 294 MOSM/KG (ref 275–295)
P AXIS: 36 DEGREES
P-R INTERVAL: 146 MS
PH UR STRIP.AUTO: 5 [PH] (ref 4.5–8)
PLATELET # BLD AUTO: 176 10(3)UL (ref 150–450)
POTASSIUM SERPL-SCNC: 3.9 MMOL/L (ref 3.5–5.1)
PROT UR STRIP.AUTO-MCNC: 30 MG/DL
Q-T INTERVAL: 364 MS
QRS DURATION: 76 MS
QTC CALCULATION (BEZET): 445 MS
R AXIS: 4 DEGREES
RBC # BLD AUTO: 5.28 X10(6)UL (ref 3.8–5.3)
SODIUM SERPL-SCNC: 140 MMOL/L (ref 136–145)
SP GR UR STRIP.AUTO: 1.03 (ref 1–1.03)
T AXIS: 56 DEGREES
UROBILINOGEN UR STRIP.AUTO-MCNC: <2 MG/DL
VENTRICULAR RATE: 90 BPM
WBC # BLD AUTO: 7.9 X10(3) UL (ref 4–11)
WBC CLUMPS UR QL AUTO: PRESENT

## 2019-07-08 PROCEDURE — 99220 INITIAL OBSERVATION CARE,LEVL III: CPT | Performed by: HOSPITALIST

## 2019-07-08 PROCEDURE — 5A09357 ASSISTANCE WITH RESPIRATORY VENTILATION, LESS THAN 24 CONSECUTIVE HOURS, CONTINUOUS POSITIVE AIRWAY PRESSURE: ICD-10-PCS | Performed by: HOSPITALIST

## 2019-07-08 PROCEDURE — 71045 X-RAY EXAM CHEST 1 VIEW: CPT | Performed by: EMERGENCY MEDICINE

## 2019-07-08 RX ORDER — PRAVASTATIN SODIUM 20 MG
20 TABLET ORAL NIGHTLY
COMMUNITY
End: 2020-02-28

## 2019-07-08 RX ORDER — ONDANSETRON 2 MG/ML
4 INJECTION INTRAMUSCULAR; INTRAVENOUS EVERY 6 HOURS PRN
Status: DISCONTINUED | OUTPATIENT
Start: 2019-07-08 | End: 2019-07-10

## 2019-07-08 RX ORDER — ASPIRIN 81 MG/1
81 TABLET ORAL NIGHTLY
Status: DISCONTINUED | OUTPATIENT
Start: 2019-07-08 | End: 2019-07-10

## 2019-07-08 RX ORDER — FLUOXETINE HYDROCHLORIDE 40 MG/1
40 CAPSULE ORAL NIGHTLY
COMMUNITY

## 2019-07-08 RX ORDER — LOSARTAN POTASSIUM 50 MG/1
50 TABLET ORAL EVERY MORNING
Status: DISCONTINUED | OUTPATIENT
Start: 2019-07-08 | End: 2019-07-10

## 2019-07-08 RX ORDER — DEXTROSE MONOHYDRATE 25 G/50ML
50 INJECTION, SOLUTION INTRAVENOUS
Status: DISCONTINUED | OUTPATIENT
Start: 2019-07-08 | End: 2019-07-10

## 2019-07-08 RX ORDER — ALLOPURINOL 300 MG/1
300 TABLET ORAL DAILY
Status: DISCONTINUED | OUTPATIENT
Start: 2019-07-08 | End: 2019-07-10

## 2019-07-08 RX ORDER — CYCLOBENZAPRINE HCL 10 MG
10 TABLET ORAL NIGHTLY PRN
Status: DISCONTINUED | OUTPATIENT
Start: 2019-07-08 | End: 2019-07-10

## 2019-07-08 RX ORDER — FLUOXETINE 10 MG/1
10 CAPSULE ORAL EVERY MORNING
COMMUNITY

## 2019-07-08 RX ORDER — TRAMADOL HYDROCHLORIDE 50 MG/1
50 TABLET ORAL EVERY 6 HOURS PRN
COMMUNITY
End: 2019-10-15

## 2019-07-08 RX ORDER — CLOTRIMAZOLE 1 %
CREAM (GRAM) TOPICAL EVERY 12 HOURS PRN
COMMUNITY
End: 2020-01-06

## 2019-07-08 RX ORDER — FLUOXETINE 10 MG/1
30 TABLET, FILM COATED ORAL EVERY MORNING
Status: DISCONTINUED | OUTPATIENT
Start: 2019-07-08 | End: 2019-07-10

## 2019-07-08 RX ORDER — PREGABALIN 100 MG/1
100 CAPSULE ORAL 3 TIMES DAILY
Status: DISCONTINUED | OUTPATIENT
Start: 2019-07-08 | End: 2019-07-10

## 2019-07-08 RX ORDER — LOSARTAN POTASSIUM 50 MG/1
50 TABLET ORAL EVERY MORNING
Status: ON HOLD | COMMUNITY
End: 2020-01-02

## 2019-07-08 RX ORDER — OMEPRAZOLE 20 MG/1
20 CAPSULE, DELAYED RELEASE ORAL
COMMUNITY
End: 2019-09-03

## 2019-07-08 RX ORDER — PANTOPRAZOLE SODIUM 20 MG/1
20 TABLET, DELAYED RELEASE ORAL
Status: DISCONTINUED | OUTPATIENT
Start: 2019-07-09 | End: 2019-07-10

## 2019-07-08 RX ORDER — FLUOXETINE 20 MG/1
20 TABLET, FILM COATED ORAL DAILY
Status: ON HOLD | COMMUNITY
End: 2019-07-08

## 2019-07-08 RX ORDER — ALLOPURINOL 300 MG/1
300 TABLET ORAL DAILY
COMMUNITY
End: 2019-10-01

## 2019-07-08 RX ORDER — PRAVASTATIN SODIUM 20 MG
20 TABLET ORAL NIGHTLY
Status: DISCONTINUED | OUTPATIENT
Start: 2019-07-08 | End: 2019-07-10

## 2019-07-08 RX ORDER — IPRATROPIUM BROMIDE AND ALBUTEROL SULFATE 2.5; .5 MG/3ML; MG/3ML
3 SOLUTION RESPIRATORY (INHALATION) EVERY 4 HOURS PRN
Status: DISCONTINUED | OUTPATIENT
Start: 2019-07-08 | End: 2019-07-10

## 2019-07-08 RX ORDER — ASPIRIN 81 MG/1
81 TABLET ORAL NIGHTLY
COMMUNITY
End: 2019-10-01

## 2019-07-08 RX ORDER — GLIPIZIDE 10 MG/1
10 TABLET, FILM COATED, EXTENDED RELEASE ORAL DAILY
COMMUNITY
End: 2019-07-29

## 2019-07-08 RX ORDER — ENOXAPARIN SODIUM 100 MG/ML
0.5 INJECTION SUBCUTANEOUS DAILY
Status: DISCONTINUED | OUTPATIENT
Start: 2019-07-08 | End: 2019-07-10

## 2019-07-08 RX ORDER — FLUOXETINE HYDROCHLORIDE 20 MG/1
20 CAPSULE ORAL EVERY MORNING
COMMUNITY

## 2019-07-08 RX ORDER — LORATADINE 10 MG/1
10 TABLET ORAL DAILY
COMMUNITY
End: 2020-02-04

## 2019-07-08 RX ORDER — ACETAMINOPHEN 325 MG/1
650 TABLET ORAL EVERY 6 HOURS PRN
Status: DISCONTINUED | OUTPATIENT
Start: 2019-07-08 | End: 2019-07-10

## 2019-07-08 RX ORDER — ERGOCALCIFEROL (VITAMIN D2) 1250 MCG
50000 CAPSULE ORAL
COMMUNITY
Start: 2019-07-09 | End: 2019-08-12 | Stop reason: ALTCHOICE

## 2019-07-08 RX ORDER — TRAZODONE HYDROCHLORIDE 50 MG/1
50 TABLET ORAL NIGHTLY
Status: DISCONTINUED | OUTPATIENT
Start: 2019-07-08 | End: 2019-07-10

## 2019-07-08 RX ORDER — PREGABALIN 100 MG/1
100 CAPSULE ORAL 3 TIMES DAILY
COMMUNITY
End: 2019-08-19

## 2019-07-08 RX ORDER — CLOTRIMAZOLE 1 %
CREAM (GRAM) TOPICAL EVERY 12 HOURS PRN
Status: DISCONTINUED | OUTPATIENT
Start: 2019-07-08 | End: 2019-07-10

## 2019-07-08 RX ORDER — SODIUM CHLORIDE 9 MG/ML
INJECTION, SOLUTION INTRAVENOUS CONTINUOUS
Status: ACTIVE | OUTPATIENT
Start: 2019-07-08 | End: 2019-07-08

## 2019-07-08 RX ORDER — METOCLOPRAMIDE HYDROCHLORIDE 5 MG/ML
10 INJECTION INTRAMUSCULAR; INTRAVENOUS EVERY 8 HOURS PRN
Status: DISCONTINUED | OUTPATIENT
Start: 2019-07-08 | End: 2019-07-10

## 2019-07-08 RX ORDER — FLUOXETINE HYDROCHLORIDE 20 MG/1
40 CAPSULE ORAL NIGHTLY
Status: DISCONTINUED | OUTPATIENT
Start: 2019-07-08 | End: 2019-07-10

## 2019-07-08 RX ORDER — FLUOXETINE HYDROCHLORIDE 60 MG/1
60 TABLET, FILM COATED ORAL; ORAL EVERY MORNING
Status: ON HOLD | COMMUNITY
End: 2019-07-08

## 2019-07-08 RX ORDER — TRAMADOL HYDROCHLORIDE 50 MG/1
50 TABLET ORAL EVERY 6 HOURS PRN
Status: DISCONTINUED | OUTPATIENT
Start: 2019-07-08 | End: 2019-07-10

## 2019-07-08 RX ORDER — BUSPIRONE HYDROCHLORIDE 5 MG/1
20 TABLET ORAL 2 TIMES DAILY
Status: DISCONTINUED | OUTPATIENT
Start: 2019-07-08 | End: 2019-07-10

## 2019-07-08 NOTE — PROGRESS NOTES
[facility] Pharmacy Note: Antimicrobial Weight Dose Adjustment for: Pieter Prince is a 74year old female who has been prescribed piperacillin/tazobactam (ZOSYN) 3.375 g for one time dose.   CrCl on 6/28/19 was approximately 104 ml/min and pt has a weig

## 2019-07-08 NOTE — DIETARY NOTE
[address] [facility]     Admitting diagnosis:  Acute cystitis without hematuria [N30.00]  Cellulitis of left lower extremity [G82.866]    Ht:  5'9\"  Wt: 136 kg (299 lb 13.2 oz).  This is 206% of IBW  Body mass index is 44

## 2019-07-08 NOTE — PROGRESS NOTES
[address] [facility] Progress Note:  Anticoagulation Weight Dose Adjustment for enoxaparin (Molinda Brass)    Ishan Plaza is a 74year old female who has been prescribed enoxaparin (LOVENOX) for VTE prophylaxis.       Estimated Creatinine Clearance: 53.5 mL/min (base

## 2019-07-08 NOTE — PROGRESS NOTES
[facility] Pharmacy Note: Antimicrobial Weight Dose Adjustment for: piperacillin/tazobactam (Shaun Hedge)    Laith Byrne is a 74year old female who has been prescribed piperacillin/tazobactam (ZOSYN) 3.375 g every 8 hours.   CrCl is estimated creatinine clearance i

## 2019-07-08 NOTE — H&P
[facility] HOSPITALIST  History and Physical     Merdis Leaver Patient Status:  Observation    1945 MRN [id number]   [facility] 4NW-A Attending Delfin Rodriguez, [facility] Day # 0 PCP Kalyn Badillo MD     Chief Complaint: Weakness    [address] [facility] OR   • TONSILLECTOMY         Social History:  reports that she has never smoked. She has never used smokeless tobacco. She reports that she does not drink alcohol or use drugs.     Family History:   Family History   Problem Relation Age of Onset   • (MYRBETRIQ) 50 MG Oral Tablet 24 Hr Take 50 mg by mouth every morning.  Disp:  Rfl:    omeprazole 20 MG Oral Capsule Delayed Release Take 20 mg by mouth every morning before breakfast. Disp:  Rfl:    Pravastatin Sodium 20 MG Oral Tab Take 20 mg by mouth nig needed for Wheezing. Disp: 1 Inhaler Rfl: 0       Review of Systems:   A comprehensive 14 point review of systems was completed. Pertinent positives and negatives noted in the HPI.     Physical Exam:    /52   Pulse 86   Temp 98.4 °F (36.9 °C) (Oral

## 2019-07-08 NOTE — ED INITIAL ASSESSMENT (HPI)
Medics called to NH for altered mental status. Hx of recent UTI on cipro should finish today. Hx of LLE cellulitis with leg now red and warm to touch.

## 2019-07-08 NOTE — PLAN OF CARE
NURSING ADMISSION NOTE      Patient admitted via cart  Oriented to room. Safety precautions initiated. Bed in low position. Call light in reach. Report given and database completed.

## 2019-07-08 NOTE — ED PROVIDER NOTES
Patient Seen in: [facility] Emergency Department    History   Patient presents with:  Altered Mental Status (neurologic)    Stated Complaint: ams    HPI    47-year-old female with history of diabetes was brought to the emergency room for evaluation METATARSAL+TOE,SINGLE Left     left great toe    • APPENDECTOMY     • CHOLECYSTECTOMY     • FINGER AMPUTATION/ EXTRA DIGIT REMOVAL Right 6/2/2016    Performed by Elysa Hamman, MD at [address]   • TONSILLECTOMY             Social History    Tobacco Use Esterase Urine Moderate (*)     WBC Urine 11-20 (*)     Squamous Epi.  Cells Large (*)     Mucous Urine 3+ (*)     WBC Clump Present (*)     All other components within normal limits   POCT GLUCOSE - Abnormal; Notable for the following components:    POC Gl transcribed by Technologist)  Patient complains of back pain and weakness. FINDINGS:  KIDNEYS:  No mass, obstruction, or calcification. ADRENALS:  No mass or enlargement. LIVER:  No enlargement, atrophy, abnormal density, or significant focal lesion. effusions. No pneumothorax. CONCLUSION:  1. Stable cardiomegaly with interstitial opacities likely representing edema.  2. Right basilar rounded opacity which may represent area of atelectasis or infiltrates but CT of the chest is recommended to excl

## 2019-07-08 NOTE — PROGRESS NOTES
07/08/19 1308   Clinical Encounter Type   Routine Visit   (Request for [address] communion)   Referral From Nurse   Referral To   (Eucharistic )   Quaker Encounters   Spiritual Requests During Visit / Hospitalization Gnosticism Communion   Refer

## 2019-07-09 LAB
ANION GAP SERPL CALC-SCNC: 5 MMOL/L (ref 0–18)
BUN BLD-MCNC: 19 MG/DL (ref 7–18)
BUN/CREAT SERPL: 19.8 (ref 10–20)
CALCIUM BLD-MCNC: 8.1 MG/DL (ref 8.5–10.1)
CHLORIDE SERPL-SCNC: 108 MMOL/L (ref 98–112)
CO2 SERPL-SCNC: 30 MMOL/L (ref 21–32)
CREAT BLD-MCNC: 0.96 MG/DL (ref 0.55–1.02)
GLUCOSE BLD-MCNC: 125 MG/DL (ref 70–99)
GLUCOSE BLD-MCNC: 133 MG/DL (ref 70–99)
GLUCOSE BLD-MCNC: 145 MG/DL (ref 70–99)
GLUCOSE BLD-MCNC: 146 MG/DL (ref 70–99)
GLUCOSE BLD-MCNC: 166 MG/DL (ref 70–99)
OSMOLALITY SERPL CALC.SUM OF ELEC: 301 MOSM/KG (ref 275–295)
POTASSIUM SERPL-SCNC: 3.6 MMOL/L (ref 3.5–5.1)
SODIUM SERPL-SCNC: 143 MMOL/L (ref 136–145)

## 2019-07-09 PROCEDURE — 99225 SUBSEQUENT OBSERVATION CARE: CPT | Performed by: HOSPITALIST

## 2019-07-09 RX ORDER — SODIUM CHLORIDE 9 MG/ML
INJECTION, SOLUTION INTRAVENOUS ONCE
Status: COMPLETED | OUTPATIENT
Start: 2019-07-09 | End: 2019-07-09

## 2019-07-09 NOTE — RESPIRATORY THERAPY NOTE
ABDIAZIZ : EQUIPMENT USE: DAILY SUMMARY                                            SET MODE:                                          USAGE IN HOURS:                                          90% EXP. PRESSURE(EPAP):

## 2019-07-09 NOTE — PROGRESS NOTES
[facility] HOSPITALIST  Progress Note     Kip Blanchard Patient Status:  Observation    1945 MRN [id number]   [facility] 4NW-A Attending Emerita Moy, [address] [facility] Day # 0 PCP Daniel Merritt MD     Chief Complaint: Weakness    S: Patient the last 168 hours. Imaging: Imaging data reviewed in Epic.     Medications:   • enoxaparin  0.5 mg/kg Subcutaneous Daily   • piperacillin-tazobactam  4.5 g Intravenous Q8H   • allopurinol  300 mg Oral Daily   • aspirin  81 mg Oral Nightly   • busPI

## 2019-07-09 NOTE — PROGRESS NOTES
07/09/19 1417   Clinical Encounter Type   Visited With Patient   Sacramental Encounters   Sacrament of Sick-Anointing Anointed  (Kyle Connolly provided prayer, Scripture, support, and Sacrament of the Sick.)

## 2019-07-09 NOTE — PLAN OF CARE
Pt alert and oriented, forgetful at times. VSS, afebrile. C/o phantom pain, left leg pain and back pain. PRN and scheduled medications given per [facility]. Denies SOB or nausea. Purewick external catheter in place. Bed pan used for bowel movements.  IV abx given

## 2019-07-09 NOTE — CM/SW NOTE
CM confirmed patient is from [address] [place] [address] x 10 years and plans on returning when medically stable for discharge.     Collinsnayely Laureano, 07/09/19, 5:40 [phone]

## 2019-07-09 NOTE — CONSULTS
[facility]  Report of Inpatient Wound Care Consultation     Joleen Howekaran Patient Status:  Observation    1945 MRN [id number]   [facility] 4NW-A Attending Eric Garza, [address] [facility] Day # 0 PCP Bharath Lopez MD     REASON FOR great toe    • APPENDECTOMY     • CHOLECYSTECTOMY     • FINGER AMPUTATION/ EXTRA DIGIT REMOVAL Right 6/2/2016    Performed by Rodolfo Swift MD at [facility] MAIN OR   • TONSILLECTOMY       Social History    Tobacco Use      Smoking status: Never Smoker      Smoke at [phone] or page me at [phone] if you have any questions about this consultation and plan of care. If unable to reach me at these, please call the Inpatient Wound Care pager at #[phone]. Time Spent 30 Minutes. Thank you,    Duong Lopez.  Lucrecia Hartley PT, [facility]  THE [facility]

## 2019-07-10 ENCOUNTER — TELEPHONE (OUTPATIENT)
Dept: INTERNAL MEDICINE CLINIC | Facility: CLINIC | Age: 74
End: 2019-07-10

## 2019-07-10 VITALS
TEMPERATURE: 98 F | BODY MASS INDEX: 46 KG/M2 | SYSTOLIC BLOOD PRESSURE: 153 MMHG | WEIGHT: 293 LBS | DIASTOLIC BLOOD PRESSURE: 49 MMHG | RESPIRATION RATE: 18 BRPM | HEART RATE: 73 BPM | OXYGEN SATURATION: 92 %

## 2019-07-10 LAB
GLUCOSE BLD-MCNC: 118 MG/DL (ref 70–99)
GLUCOSE BLD-MCNC: 142 MG/DL (ref 70–99)
GLUCOSE BLD-MCNC: 197 MG/DL (ref 70–99)
POTASSIUM SERPL-SCNC: 4.1 MMOL/L (ref 3.5–5.1)

## 2019-07-10 PROCEDURE — 99232 SBSQ HOSP IP/OBS MODERATE 35: CPT | Performed by: HOSPITALIST

## 2019-07-10 RX ORDER — POTASSIUM CHLORIDE 20 MEQ/1
40 TABLET, EXTENDED RELEASE ORAL EVERY 4 HOURS
Status: DISCONTINUED | OUTPATIENT
Start: 2019-07-10 | End: 2019-07-10

## 2019-07-10 RX ORDER — CEPHALEXIN 500 MG/1
500 CAPSULE ORAL 2 TIMES DAILY
Qty: 10 CAPSULE | Refills: 0 | Status: SHIPPED | OUTPATIENT
Start: 2019-07-10 | End: 2019-07-15

## 2019-07-10 NOTE — CM/SW NOTE
07/10/19 1600   Discharge disposition   Expected discharge disposition Skilled Nurs   Name of Facillity/Home Care/Hospice [address] [place] [address] Provider   (Deo Porter)   Home services after discharge Skilled home care   E provid

## 2019-07-10 NOTE — TELEPHONE ENCOUNTER
LOV 3/22/19  Called and spoke with [facility] to inform ok for [facility] orders. Cortez Machuca verbalized understanding and agreed with POC.

## 2019-07-10 NOTE — OCCUPATIONAL THERAPY NOTE
OCCUPATIONAL THERAPY EVALUATION - INPATIENT    Room Number: 418/418-A  Evaluation Date: 7/10/2019     Type of Evaluation: Initial  Presenting Problem: LLE cellulitis    Physician Order: IP Consult to Occupational Therapy  Reason for Therapy:  ADL/IADL Dysf • FINGER AMPUTATION/ EXTRA DIGIT REMOVAL Right 6/2/2016    Performed by Juarez Murphy MD at [facility]  Type of Home: [facility] supportive living)  Home Layout: One level  Lives With: Score:   Score: 17  Approx Degree of Impairment: 50.11%  Standardized Score (AM-PAC Scale): 37.26  CMS Modifier (G-Code): CK    FUNCTIONAL TRANSFER ASSESSMENT  Supine to Sit : Moderate assistance  Sit to Stand: Minimum assistance    Skilled Therapy Provided therapy records    Specific performance deficits impacting engagement in ADL/IADL  LOW  1 - 3 performance deficits    Client Assessment/Performance Deficits  LOW - No comorbidities nor modifications of tasks    Clinical Decision Making  LOW - Analysis of o

## 2019-07-10 NOTE — PHYSICAL THERAPY NOTE
PHYSICAL THERAPY EVALUATION - INPATIENT     Room Number: 418/418-A  Evaluation Date: 7/10/2019  Type of Evaluation: Initial  Physician Order: PT Eval and Treat    Presenting Problem: acute cystitis, s/p fall  Reason for Therapy: Mobility Dysfunction • CHOLECYSTECTOMY     • FINGER AMPUTATION/ EXTRA DIGIT REMOVAL Right 6/2/2016    Performed by Maryan Sawant MD at [address]  Type of Home: [facility] supportive living)   Home Layout: One level  Stairs to [place] in hospital room?: Total   -   Climbing 3-5 steps with a railing?: Total       AM-PAC Score:  Raw Score: 12   Approx Degree of Impairment: 68.66%   Standardized Score (AM-PAC Scale): 35.33   CMS Modifier (G-Code): CL    FUNCTIONAL ABILITY STATUS  Gait Asse supports that patients with this level of impairment may benefit from ROSLYN. Laboy Claude Based on this evaluation, patient's clinical presentation is evolving and overall the evaluation complexity is considered moderate.   These impairments and comorbidities manifest

## 2019-07-10 NOTE — HOME CARE LIAISON
Received referral from RENATO Rizzo. Spoke with patient via phone regarding home health. Patient is agreeable to [facility]. Residential brochure provided with contact information. All questions addressed and answered.

## 2019-07-10 NOTE — RESPIRATORY THERAPY NOTE
ABDIAZIZ - Equipment Use Daily Summary:                  . Set Mode: AUTO CPAP WITH C-FLEX                . Usage in hours: 8:06                . 90% Pressure (EPAP) level: 9.0                . 90% Insp. Pressure (IPAP): Oneal Claude  AHI: 2.0

## 2019-07-10 NOTE — PLAN OF CARE
Assumed care for this patient at 0730: patient alert and oriented. Denies pain. Admitted with left leg cellulitis and generalized weakness, possible UTI. Plan for daily dressing changes. IV abx. PT/OT eval. Vitals stable. Will continue to monitor.

## 2019-07-10 NOTE — PROGRESS NOTES
[facility] HOSPITALIST  Progress Note     Oscar Farmer Patient Status:  Observation    1945 MRN [id number]   [facility] 4NW-A Attending Guido Pederson, [address] [facility] Day # 0 PCP Alize Pickard MD     Chief Complaint: Weakness    S: Patient the last 168 hours. Imaging: Imaging data reviewed in Epic.     Medications:   • enoxaparin  0.5 mg/kg Subcutaneous Daily   • piperacillin-tazobactam  4.5 g Intravenous Q8H   • allopurinol  300 mg Oral Daily   • aspirin  81 mg Oral Nightly   • busPI

## 2019-07-10 NOTE — PLAN OF CARE
Pt alert and oriented. VSS, afebrile. SpO2 within normal limits on CPAP. Denies SOB or nausea. C/o left foot pain and phantom pain. Pt repositioned and pain medication given per MAR.  Creams applied to groin area and beneath right breast. Fall precautions i

## 2019-07-10 NOTE — DISCHARGE SUMMARY
[facility] HOSPITALIST  DISCHARGE SUMMARY     Noah Arzate Patient Status:  Inpatient    1945 MRN [id number]   [facility] 4NW-A Attending No att. providers found   Hosp Day # 1 PCP Liane Martel MD     Date of Admission: 2019  Dashawn 5 days.    Stop taking on:  7/15/2019  Quantity:  10 capsule  Refills:  0        CONTINUE taking these medications      Instructions Prescription details   Albuterol Sulfate  (90 Base) MCG/ACT Aers  Commonly known as:  PROAIR HFA      Inhale 2 puffs mouth daily. Refills:  0     losartan Potassium 50 MG Tabs  Commonly known as:  COZAAR      Take 50 mg by mouth every morning. Refills:  0     LYRICA 100 MG Caps  Generic drug:  pregabalin      Take 100 mg by mouth 3 (three) times daily.    Refills:  0 Phone:  [phone]   · cephALEXin 500 MG Caps         [place]    Follow-up appointment:   Giovana Trevizo MD  [address] [place], [place] [id number] [phone]    In 1 week      Appointments for Next 30 Days 7/14/2019 - 8/13

## 2019-07-11 ENCOUNTER — PATIENT OUTREACH (OUTPATIENT)
Dept: CASE MANAGEMENT | Age: 74
End: 2019-07-11

## 2019-07-11 NOTE — PLAN OF CARE
IV discontinued. Discharge paperwork on chart. Waiting for ambulance for discharge back to [facility]. . Patient updated and all questions answered.

## 2019-07-11 NOTE — PROGRESS NOTES
Attempted to reach pt for TCM. Pt answered, I introduced myself, then pt hung up on me. Will try again to reach pt.

## 2019-07-16 ENCOUNTER — TELEPHONE (OUTPATIENT)
Dept: INTERNAL MEDICINE CLINIC | Facility: CLINIC | Age: 74
End: 2019-07-16

## 2019-07-16 NOTE — TELEPHONE ENCOUNTER
Nat from residential home health calling to let  know the nursing, PT and OT started yesterday. There was a previous order placed in June, which patient never answered the phone, so care was never started at that time,  Patient was recently hospitalized for a UTI and agreed to receive home health at this time.     Patient does not always answer the phone but they will keep trying

## 2019-07-18 ENCOUNTER — TELEPHONE (OUTPATIENT)
Dept: INTERNAL MEDICINE CLINIC | Facility: CLINIC | Age: 74
End: 2019-07-18

## 2019-07-18 ENCOUNTER — APPOINTMENT (OUTPATIENT)
Dept: LAB | Age: 74
End: 2019-07-18
Attending: INTERNAL MEDICINE
Payer: MEDICARE

## 2019-07-18 DIAGNOSIS — E53.8 B12 DEFICIENCY: ICD-10-CM

## 2019-07-18 DIAGNOSIS — I10 BENIGN ESSENTIAL HTN: ICD-10-CM

## 2019-07-18 DIAGNOSIS — E78.5 DYSLIPIDEMIA: ICD-10-CM

## 2019-07-18 DIAGNOSIS — E11.69 TYPE 2 DIABETES MELLITUS WITH OTHER SPECIFIED COMPLICATION, WITHOUT LONG-TERM CURRENT USE OF INSULIN (HCC): ICD-10-CM

## 2019-07-18 LAB
ALBUMIN SERPL-MCNC: 2.7 G/DL (ref 3.4–5)
ALBUMIN/GLOB SERPL: 0.8 {RATIO} (ref 1–2)
ALP LIVER SERPL-CCNC: 94 U/L (ref 55–142)
ALT SERPL-CCNC: 14 U/L (ref 13–56)
ANION GAP SERPL CALC-SCNC: 5 MMOL/L (ref 0–18)
AST SERPL-CCNC: 12 U/L (ref 15–37)
BILIRUB SERPL-MCNC: 0.3 MG/DL (ref 0.1–2)
BUN BLD-MCNC: 16 MG/DL (ref 7–18)
BUN/CREAT SERPL: 19 (ref 10–20)
CALCIUM BLD-MCNC: 8.3 MG/DL (ref 8.5–10.1)
CHLORIDE SERPL-SCNC: 105 MMOL/L (ref 98–112)
CHOLEST SMN-MCNC: 97 MG/DL (ref ?–200)
CO2 SERPL-SCNC: 31 MMOL/L (ref 21–32)
CREAT BLD-MCNC: 0.84 MG/DL (ref 0.55–1.02)
EST. AVERAGE GLUCOSE BLD GHB EST-MCNC: 140 MG/DL (ref 68–126)
GLOBULIN PLAS-MCNC: 3.4 G/DL (ref 2.8–4.4)
GLUCOSE BLD-MCNC: 82 MG/DL (ref 70–99)
HBA1C MFR BLD HPLC: 6.5 % (ref ?–5.7)
HDLC SERPL-MCNC: 40 MG/DL (ref 40–59)
LDLC SERPL CALC-MCNC: 22 MG/DL (ref ?–100)
M PROTEIN MFR SERPL ELPH: 6.1 G/DL (ref 6.4–8.2)
NONHDLC SERPL-MCNC: 57 MG/DL (ref ?–130)
OSMOLALITY SERPL CALC.SUM OF ELEC: 292 MOSM/KG (ref 275–295)
POTASSIUM SERPL-SCNC: 3.8 MMOL/L (ref 3.5–5.1)
SODIUM SERPL-SCNC: 141 MMOL/L (ref 136–145)
TRIGL SERPL-MCNC: 176 MG/DL (ref 30–149)
VIT B12 SERPL-MCNC: 417 PG/ML (ref 193–986)
VLDLC SERPL CALC-MCNC: 35 MG/DL (ref 0–30)

## 2019-07-18 PROCEDURE — 83036 HEMOGLOBIN GLYCOSYLATED A1C: CPT

## 2019-07-18 PROCEDURE — 82607 VITAMIN B-12: CPT

## 2019-07-18 PROCEDURE — 36415 COLL VENOUS BLD VENIPUNCTURE: CPT

## 2019-07-18 PROCEDURE — 80061 LIPID PANEL: CPT

## 2019-07-18 PROCEDURE — 80053 COMPREHEN METABOLIC PANEL: CPT

## 2019-07-18 NOTE — TELEPHONE ENCOUNTER
[phone]   Called pt stating is doing well at this time. Cellulitis of LLE improved with abx. No growth on ucx. No further urinary issues. No chest pain. No SOB. Scheduled HFU on 7/30 as requested.  Pt verbalized understanding and agreed with POC

## 2019-07-19 ENCOUNTER — TELEPHONE (OUTPATIENT)
Dept: INTERNAL MEDICINE CLINIC | Facility: CLINIC | Age: 74
End: 2019-07-19

## 2019-07-19 NOTE — TELEPHONE ENCOUNTER
Xenia Hatchet from [facility] needs a verbal that this is [employer] [phone]

## 2019-07-19 NOTE — TELEPHONE ENCOUNTER
Need  Verbal ok for PT for  1x per week for 1 week and then  2x per week 2 weeks for strengthening and endurance and safety-please call with verbal

## 2019-07-19 NOTE — TELEPHONE ENCOUNTER
Manuelito Gordon PT [facility] notified ok for PT orders and Pulse ox at each PT visit.

## 2019-07-22 RX ORDER — ACETAMINOPHEN 500 MG
TABLET ORAL
Qty: 180 TABLET | Refills: 11 | Status: SHIPPED | OUTPATIENT
Start: 2019-07-22 | End: 2019-12-10

## 2019-07-22 NOTE — TELEPHONE ENCOUNTER
Spoke with Francis Moreno re: OT orders: Will see patient 1 x week for 1 week, 2 x week the 2nd week and 1 x per week the 3rd week.   She will see patient for OT for upper extremities strength deficits, active range of motor deficits, impaired balance and impaired

## 2019-07-22 NOTE — TELEPHONE ENCOUNTER
Called and spoke with [facility] to inform, per TB, ok for OT 1 x week for 1 week, 2 x week the 2nd week and 1 x per week the 3rd week. Ramon Regan verbalized understanding and agreed with POC.

## 2019-07-22 NOTE — TELEPHONE ENCOUNTER
JML:8/45/12 TB  FOV:7/30/19   LAST RX:500 mg take 1-2 tabs by mouth three times day as needed 180 tabs 0 refills   LAST LABS:7/18/19 cmp,lipids,a1c,vit b12  PER PROTOCOL: to provider

## 2019-07-26 ENCOUNTER — TELEPHONE (OUTPATIENT)
Dept: INTERNAL MEDICINE CLINIC | Facility: CLINIC | Age: 74
End: 2019-07-26

## 2019-07-26 NOTE — TELEPHONE ENCOUNTER
Gloria Rodriguez has been seeing patient for 2 weeks and she is doing really well. She wants to continue PT 2 x per week for 2 weeks starting 8/5.   Please call

## 2019-07-26 NOTE — TELEPHONE ENCOUNTER
LOV 3/22/19  Called pt and spoke with Marti Walker stating pt is doing really well, needs just bit more strengthening. Informed ok to continue PT 2x per week for 2 weeks starting 8/5. Yue Martinez verbalized understanding and agreed with POC.   DOM

## 2019-07-29 ENCOUNTER — TELEPHONE (OUTPATIENT)
Dept: INTERNAL MEDICINE CLINIC | Facility: CLINIC | Age: 74
End: 2019-07-29

## 2019-07-29 RX ORDER — GLIPIZIDE 10 MG/1
TABLET, FILM COATED, EXTENDED RELEASE ORAL
Qty: 90 TABLET | Refills: 1 | Status: SHIPPED | OUTPATIENT
Start: 2019-07-29 | End: 2020-01-27

## 2019-07-29 NOTE — TELEPHONE ENCOUNTER
Received fax from [facility] foot physicians indicating on physical exam revealed pt has condition listed qualifies pt for footwear and inserts under Medicare therapeutic shoe bill.  Medicare guidelines require that the physician who is managing pt's DM condi

## 2019-07-29 NOTE — TELEPHONE ENCOUNTER
Last Office Visit: 3-22-19 with TB for cpe  Last Rx Filled: historical  Last Labs: 7-18-19 cmp/lipid/hga1c/vitamin B12  Future Appointment: 7-30-19    Per protocol to provider

## 2019-07-30 ENCOUNTER — OFFICE VISIT (OUTPATIENT)
Dept: INTERNAL MEDICINE CLINIC | Facility: CLINIC | Age: 74
End: 2019-07-30
Payer: MEDICARE

## 2019-07-30 VITALS
TEMPERATURE: 98 F | DIASTOLIC BLOOD PRESSURE: 70 MMHG | SYSTOLIC BLOOD PRESSURE: 130 MMHG | HEART RATE: 76 BPM | RESPIRATION RATE: 20 BRPM

## 2019-07-30 DIAGNOSIS — L03.116 CELLULITIS OF LEFT LEG: ICD-10-CM

## 2019-07-30 DIAGNOSIS — N32.81 OAB (OVERACTIVE BLADDER): Primary | ICD-10-CM

## 2019-07-30 DIAGNOSIS — E11.8 TYPE 2 DIABETES MELLITUS WITH COMPLICATION, WITHOUT LONG-TERM CURRENT USE OF INSULIN (HCC): ICD-10-CM

## 2019-07-30 DIAGNOSIS — I10 ESSENTIAL HYPERTENSION: ICD-10-CM

## 2019-07-30 PROCEDURE — 99214 OFFICE O/P EST MOD 30 MIN: CPT | Performed by: INTERNAL MEDICINE

## 2019-07-30 NOTE — PROGRESS NOTES
Beti Manriquez is a 74year old female.   Patient presents with:  TCM (Transition Of Care Management): TN Exam Room 3: Multiple post hospital follow up: Pneumonia, cellulitis of left leg      HPI:     Patient here for HFU, admitted 2 times recently to [facility] left lower lobe of lung (HCC)     Azotemia     Hyperglycemia     Blood infection (HCC)     Elevated troponin     B12 deficiency     Nosocomial pneumonia     Hypoxia     Acute bronchospasm     Sepsis, due to unspecified organism ([facility] [address]     Acute cystitis wit mouth nightly. Disp:  Rfl:    traMADol HCl 50 MG Oral Tab Take 50 mg by mouth every 6 (six) hours as needed for Pain. Disp:  Rfl:    aspirin 81 MG Oral Tab EC Take 81 mg by mouth nightly.  Disp:  Rfl:    ipratropium-albuterol 0.5-2.5 (3) MG/3ML Inhalation S finger ([facility] 2/25/2015   • Pneumonia due to organism    • Sleep apnea    • Ulcer of finger ([facility]       Social History:  Social History    Tobacco Use      Smoking status: Never Smoker      Smokeless tobacco: Never Used    Alcohol use: No    Drug use:  No diabetes mellitus with complication, without long-term current use of insulin (hcc)- +neuropathy in left foot, hgba1c to goal. DM shoe forms filled out, eye exam report requested from Dr. Rodolfo Bunn hypertension- controlled, CPM  Oab (overactive

## 2019-07-31 NOTE — TELEPHONE ENCOUNTER
Order and medical necessity along with office visit notes from yesterday faxed back to Juan M Freeman at [phone] confirmation received and order sent to scanning.

## 2019-08-02 ENCOUNTER — OFFICE VISIT (OUTPATIENT)
Dept: INTERNAL MEDICINE CLINIC | Facility: CLINIC | Age: 74
End: 2019-08-02
Payer: MEDICARE

## 2019-08-02 VITALS — SYSTOLIC BLOOD PRESSURE: 134 MMHG | HEART RATE: 72 BPM | RESPIRATION RATE: 18 BRPM | DIASTOLIC BLOOD PRESSURE: 68 MMHG

## 2019-08-02 DIAGNOSIS — S81.812A LACERATION OF LEFT LOWER EXTREMITY, INITIAL ENCOUNTER: Primary | ICD-10-CM

## 2019-08-02 PROCEDURE — 99213 OFFICE O/P EST LOW 20 MIN: CPT | Performed by: INTERNAL MEDICINE

## 2019-08-02 NOTE — PATIENT INSTRUCTIONS
Daily dressing change to left lower leg  Apply triple antibiotic ointment with gauze  Monitor for discharge, redness, fevers or chills  After 7 days, leave leg open to air so that scab can form

## 2019-08-02 NOTE — PROGRESS NOTES
Erik Sesay is a 74year old female. Patient presents with:  Cut: cn room 4: patient is here for a cut on her leg       HPI:     C/o small cut on her left lower leg that occurred yesterday.  She thinks she scraped it against a chair in her room at [facility] hours as needed. To affected areas, use with hydrocortisone cream. Disp:  Rfl:    hydrocortisone 2.5 % External Cream Apply topically every 12 (twelve) hours as needed.  To affected areas, use with clotrimazole cream. Disp:  Rfl:    SITagliptin Phosphate 50 Inhaler Rfl: 0   BusPIRone HCl 10 MG Oral Tab Take 20 mg by mouth 2 (two) times daily. Disp:  Rfl:    Calcium Carbonate-Vitamin D (OYSTER SHELL CALCIUM/D) 500-200 MG-UNIT Oral Tab Take 1 tablet by mouth 2 (two) times daily.    Disp:  Rfl:    Cyclobenzaprine Aspartame                 Clindamycin             DIARRHEA  Lisinopril              Coughing  Other                       Comment:Please see extensive Neurotoxic Medication List in             Media before prescribing medications  [facility]

## 2019-08-09 ENCOUNTER — TELEPHONE (OUTPATIENT)
Dept: INTERNAL MEDICINE CLINIC | Facility: CLINIC | Age: 74
End: 2019-08-09

## 2019-08-09 NOTE — TELEPHONE ENCOUNTER
[facility] calling to report patient slid from the side of the bed onto the floor at her facility. Patient has no injuries; did not hit her head nor has any abrasions. They are working to install a grab bar on her bed.

## 2019-08-13 ENCOUNTER — TELEPHONE (OUTPATIENT)
Dept: INTERNAL MEDICINE CLINIC | Facility: CLINIC | Age: 74
End: 2019-08-13

## 2019-08-14 ENCOUNTER — TELEPHONE (OUTPATIENT)
Dept: INTERNAL MEDICINE CLINIC | Facility: CLINIC | Age: 74
End: 2019-08-14

## 2019-08-14 NOTE — TELEPHONE ENCOUNTER
I'm a little unclear about what the question is but yes, she should take the z-tania prescribed to her by Puldeep yesterday at her visit. Was there some other orders I'm missing that she is asking if she should follow?

## 2019-08-14 NOTE — TELEPHONE ENCOUNTER
LM on VM providing verbal ok to Correlor PT [facility] to extend PT. [employer] to call back with any further questions.

## 2019-08-14 NOTE — TELEPHONE ENCOUNTER
Spoke with Kevin Sánchez was asking if it is ok with TB for home health to take orders from other physicians, since [facility] the [facility].  Patient was recently seen by Dr. Nicanor Collazo, imaging was completed, patient still has pneumonia so she wa

## 2019-08-14 NOTE — TELEPHONE ENCOUNTER
She saw pt yesterday was given a new med by  Dr Al Garcia ok with TB to take orders from other doctors? Pt still has pneumonia and is being treated with Azithromycin 2 tablets yesterday and 1 tablet for the next 4 days. Is this ok?  Please a

## 2019-08-14 NOTE — TELEPHONE ENCOUNTER
Spoke with Flaco Balderas PT [facility] informed ok to take orders form other providers per CB. Flaco Balderas verbalized understanding and agreeable to POC.

## 2019-08-16 ENCOUNTER — TELEPHONE (OUTPATIENT)
Dept: INTERNAL MEDICINE CLINIC | Facility: CLINIC | Age: 74
End: 2019-08-16

## 2019-08-16 NOTE — TELEPHONE ENCOUNTER
Swapna GARCIA [facility] wants mucinex to help with phlegm removal and wants to recert pt for [place] weekly starting 09/11/19    Please advise

## 2019-08-16 NOTE — TELEPHONE ENCOUNTER
Called and spoke to Eliane Villalta at [facility]. Provided verbal orders for Mucinex and to recert for home care starting 9/11/19.     She is asking for Rx to be sent as pt does not have transportation to get otc Mucinex and if she has Rx sent, it will be delivered to p

## 2019-08-17 RX ORDER — GUAIFENESIN 600 MG
600 TABLET, EXTENDED RELEASE 12 HR ORAL 2 TIMES DAILY PRN
Qty: 60 TABLET | Refills: 0 | Status: SHIPPED | OUTPATIENT
Start: 2019-08-17 | End: 2019-12-10

## 2019-08-19 ENCOUNTER — TELEPHONE (OUTPATIENT)
Dept: INTERNAL MEDICINE CLINIC | Facility: CLINIC | Age: 74
End: 2019-08-19

## 2019-08-19 NOTE — TELEPHONE ENCOUNTER
Ok to give robitussin, both are otc so insurance may not cover either. Some insurances cover otc meds and some do not seem to cover them.

## 2019-08-19 NOTE — TELEPHONE ENCOUNTER
Ins will not cover this medication  guaiFENesin ER (MUCINEX) 600 MG Oral Tablet 12 Hr 60 tablet 0 8/17/2019    Sig:   Take 1 tablet (600 mg total) by mouth 2 (two) times daily as needed for congestion.         Pt would like the liquid form possibly Robituss

## 2019-08-19 NOTE — TELEPHONE ENCOUNTER
LOV: 8/2/19 w/ TB  FOV: 10/29/19  Last labs: 7/18/18 CMP,LIPID,A1C,VIT B12  Last Refill: 12/17/18 qt:90 2 refills    Per protocol routed to provider

## 2019-08-19 NOTE — TELEPHONE ENCOUNTER
Called and spoke with pharmacist @[facility] to inform, per TB, ok for Robitussin, plain as this is what insurance will cover. Pharmacist verbalized understanding and agreed with POC.

## 2019-09-03 RX ORDER — OMEPRAZOLE 20 MG/1
CAPSULE, DELAYED RELEASE ORAL
Qty: 30 CAPSULE | Refills: 10 | Status: SHIPPED | OUTPATIENT
Start: 2019-09-03 | End: 2020-07-13

## 2019-09-03 NOTE — TELEPHONE ENCOUNTER
Last Ov: 8/2/19, TB, acute  Upcoming appt: 10/29/19  Last labs: Vit B12, A1c, Lipid, CMP 7/18/19  Last Rx: omeprazole 20mg last refilled historically    Per Protocol, not on protocol. Rx pending.

## 2019-09-16 RX ORDER — LOSARTAN POTASSIUM 50 MG/1
TABLET ORAL
Qty: 90 TABLET | Refills: 1 | Status: SHIPPED | OUTPATIENT
Start: 2019-09-16 | End: 2019-12-10

## 2019-09-17 ENCOUNTER — TELEPHONE (OUTPATIENT)
Dept: INTERNAL MEDICINE CLINIC | Facility: CLINIC | Age: 74
End: 2019-09-17

## 2019-09-17 RX ORDER — B-COMPLEX WITH VITAMIN C
1 TABLET ORAL 2 TIMES DAILY
Qty: 60 TABLET | Refills: 11 | Status: SHIPPED | OUTPATIENT
Start: 2019-09-17 | End: 2020-08-17

## 2019-09-17 NOTE — TELEPHONE ENCOUNTER
rx never filled in office     Please advise,    LOV:8/2/19 TB  FOV:10/29/19   LAST RX:  LAST LABS:7/18/19 cmp,lipids,a1c,vit b12  PER PROTOCOL: to provider

## 2019-09-17 NOTE — TELEPHONE ENCOUNTER
Gt Toth From [facility] called and needs an RX faxed to them for the pts Calcium Carbonate-Vitamin D (OYSTER SHELL CALCIUM/D) 500-200 MG-UNIT Oral Tab      Fax- [phone]

## 2019-09-23 RX ORDER — PREGABALIN 100 MG/1
CAPSULE ORAL
Qty: 90 CAPSULE | Refills: 3 | Status: SHIPPED | OUTPATIENT
Start: 2019-09-23 | End: 2019-12-24

## 2019-09-23 NOTE — TELEPHONE ENCOUNTER
Last Ov: 8/2/19, TB, acute  Upcoming appt: 10/29/19, TB  Last labs: Vit B12, A1c, Lipid, CMP 7/18/19  Last Rx: lyrica 100mg, #90, 4R 8/29/19    Per Protocol, not on protocol. Rx pending. Spoke with Ninoska Peters at [employer], states no Rx was received.

## 2019-10-01 RX ORDER — ALLOPURINOL 300 MG/1
TABLET ORAL
Qty: 30 TABLET | Refills: 10 | Status: SHIPPED | OUTPATIENT
Start: 2019-10-01 | End: 2020-08-31

## 2019-10-01 RX ORDER — ASPIRIN 81 MG
TABLET, DELAYED RELEASE (ENTERIC COATED) ORAL
Qty: 30 TABLET | Refills: 10 | Status: SHIPPED | OUTPATIENT
Start: 2019-10-01 | End: 2020-08-10

## 2019-10-01 NOTE — TELEPHONE ENCOUNTER
Last Ov: 8/2/19, TB, acute  Upcoming appt: 10/29/19, TB  Last labs: Vit B12, A1c, Lipid, CMP 7/18/19  Last Rx:   Allopurinol 300mg, #90, 1R 4/1/19  Aspir-low 81mg, #90, 1R 3/11/19    Per Protocol, neither Rx on protocol. Pending to pharmacy.

## 2019-10-15 RX ORDER — TRAMADOL HYDROCHLORIDE 50 MG/1
50 TABLET ORAL EVERY 6 HOURS PRN
Qty: 120 TABLET | Refills: 0 | Status: SHIPPED | OUTPATIENT
Start: 2019-10-15 | End: 2019-11-25

## 2019-10-15 NOTE — TELEPHONE ENCOUNTER
Last VISIT 08/02/19  Last REFILL Not on file  Last LABS 07/18/19 CMP, Lipid, A1c vit B done    Per PROTOCOL? Not on protocol    Please Approve or Deny.

## 2019-11-06 NOTE — TELEPHONE ENCOUNTER
LOV:8/2/19 TB  FOV:11/25/19   LAST RX: 12/11/18 2.5 %apply to effected area twice a day with lotrimin 28 g 11 refills  LAST LABS:7/18/19 vit b12,a1c,lipids,cmp  PER PROTOCOL: to provider

## 2019-11-18 NOTE — TELEPHONE ENCOUNTER
Last Office Visit: 8-2-19 with TB for cut  Last Rx Filled: Historical   Last Labs: 7-18-19 vitamin B12/hga1c/lipid/cmp  Future Appointment: 11-25-19    Per protocol to provider

## 2019-11-19 ENCOUNTER — TELEPHONE (OUTPATIENT)
Dept: INTERNAL MEDICINE CLINIC | Facility: CLINIC | Age: 74
End: 2019-11-19

## 2019-11-19 RX ORDER — CYCLOBENZAPRINE HCL 10 MG
TABLET ORAL
Qty: 30 TABLET | Refills: 10 | Status: SHIPPED | OUTPATIENT
Start: 2019-11-19 | End: 2019-12-10

## 2019-11-19 NOTE — TELEPHONE ENCOUNTER
Per TB note pt had DM eye exam with Miriam. I cannot find a copy in her record. Please call to obtain.

## 2019-11-21 NOTE — TELEPHONE ENCOUNTER
Records received from [facility]. Abstracted and placed in MD bin for review.

## 2019-11-26 RX ORDER — TRAMADOL HYDROCHLORIDE 50 MG/1
TABLET ORAL
Qty: 120 TABLET | Refills: 0 | Status: ON HOLD | OUTPATIENT
Start: 2019-11-26 | End: 2019-12-31

## 2019-12-02 NOTE — TELEPHONE ENCOUNTER
Last Ov: 8/2/19, TB, acute  Upcoming appt: 12/10/19, TB  Last labs: CMP, Lipid, A1c, Vit B12 7/18/19  Last Rx: hydrocortisone 2/4%, 28g, 1R 11/6/19    Per Protocol, not on protocol. Rx pending.

## 2019-12-09 RX ORDER — MIRABEGRON 50 MG/1
TABLET, FILM COATED, EXTENDED RELEASE ORAL
Qty: 90 TABLET | Refills: 3 | Status: SHIPPED | OUTPATIENT
Start: 2019-12-09 | End: 2020-12-07

## 2019-12-09 NOTE — TELEPHONE ENCOUNTER
LOV:8/2/19 TB  FOV:12/10/19   LAST RX:1/7/19 50 mg take 1 tab daily 90 tabs 3 refills   LAST LABS: 7/18/19 vit b12,a1c,lipids,cmp  PER PROTOCOL: to provider

## 2019-12-10 ENCOUNTER — OFFICE VISIT (OUTPATIENT)
Dept: INTERNAL MEDICINE CLINIC | Facility: CLINIC | Age: 74
End: 2019-12-10
Payer: MEDICARE

## 2019-12-10 VITALS — SYSTOLIC BLOOD PRESSURE: 110 MMHG | RESPIRATION RATE: 18 BRPM | HEART RATE: 64 BPM | DIASTOLIC BLOOD PRESSURE: 66 MMHG

## 2019-12-10 DIAGNOSIS — E55.9 VITAMIN D DEFICIENCY: ICD-10-CM

## 2019-12-10 DIAGNOSIS — G60.0 CHARCOT-MARIE-TOOTH DISEASE: Primary | ICD-10-CM

## 2019-12-10 DIAGNOSIS — M62.830 BACK MUSCLE SPASM: ICD-10-CM

## 2019-12-10 DIAGNOSIS — E11.8 TYPE 2 DIABETES MELLITUS WITH COMPLICATION, WITHOUT LONG-TERM CURRENT USE OF INSULIN (HCC): ICD-10-CM

## 2019-12-10 DIAGNOSIS — Z12.11 SCREEN FOR COLON CANCER: ICD-10-CM

## 2019-12-10 PROCEDURE — 99214 OFFICE O/P EST MOD 30 MIN: CPT | Performed by: INTERNAL MEDICINE

## 2019-12-10 RX ORDER — TRAZODONE HYDROCHLORIDE 100 MG/1
TABLET ORAL
Status: ON HOLD | COMMUNITY
Start: 2019-12-09 | End: 2019-12-31

## 2019-12-10 RX ORDER — CYCLOBENZAPRINE HCL 10 MG
10 TABLET ORAL 2 TIMES DAILY PRN
Qty: 60 TABLET | Refills: 2 | Status: SHIPPED | OUTPATIENT
Start: 2019-12-10 | End: 2020-07-13

## 2019-12-10 NOTE — PROGRESS NOTES
Cindy Mills is a 74year old female. 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  60 tablet 2   • MYRBETRIQ 50 MG Oral Tablet 24 Hr TAKE ONE TABLET BY MOUTH DAILY 90 tablet 3   • TRAMADOL HCL 50 MG Oral Tab TAKE ONE TABLET BY MOUTH EVERY 6 HOUR AS NEEDED FOR • Acetaminophen (MAPAP) 500 MG Oral Cap Take 1,000 mg by mouth 3 (three) times daily. • Prazosin HCl 5 MG Oral Cap Take 5 mg by mouth nightly.         Past Medical History:   Diagnosis Date   • Acute osteomyelitis of right hand ([facility] [address]            GLOB pain  GI: denies abdominal pain  : no dysuria  NEURO: denies headaches  MS: as above  HEME: No adenopathy      EXAM:   /66 (BP Location: Right arm, Patient Position: Sitting, Cuff Size: large)   Pulse 64   Resp 18   GENERAL: well developed, well no

## 2019-12-17 ENCOUNTER — TELEPHONE (OUTPATIENT)
Dept: INTERNAL MEDICINE CLINIC | Facility: CLINIC | Age: 74
End: 2019-12-17

## 2019-12-17 ENCOUNTER — APPOINTMENT (OUTPATIENT)
Dept: LAB | Age: 74
End: 2019-12-17
Attending: INTERNAL MEDICINE
Payer: MEDICARE

## 2019-12-17 DIAGNOSIS — E11.8 TYPE 2 DIABETES MELLITUS WITH COMPLICATION, WITHOUT LONG-TERM CURRENT USE OF INSULIN (HCC): ICD-10-CM

## 2019-12-17 DIAGNOSIS — E55.9 VITAMIN D DEFICIENCY: ICD-10-CM

## 2019-12-17 PROCEDURE — 80053 COMPREHEN METABOLIC PANEL: CPT

## 2019-12-17 PROCEDURE — 82306 VITAMIN D 25 HYDROXY: CPT

## 2019-12-17 PROCEDURE — 83036 HEMOGLOBIN GLYCOSYLATED A1C: CPT

## 2019-12-17 PROCEDURE — 36415 COLL VENOUS BLD VENIPUNCTURE: CPT

## 2019-12-17 NOTE — TELEPHONE ENCOUNTER
Paperwork received from [employer] for wheelchair repair signed by TB and faxed back confirmation received and sent to scan

## 2019-12-23 NOTE — TELEPHONE ENCOUNTER
Last VISIT 12/10/19  Last REFILL 09/23/19 qty 90 w/3 refills  Last LABS 12/17/19 CMP, A1c, Vit D done. Future Appointments   Date Time Provider Prashant Solis   3/13/2020  3:40 PM Michelle Burrell MD [facility]       Per PROTOCOL?  Not on felix

## 2019-12-24 RX ORDER — PREGABALIN 100 MG/1
CAPSULE ORAL
Qty: 90 CAPSULE | Refills: 0 | Status: SHIPPED | OUTPATIENT
Start: 2019-12-24 | End: 2020-02-24

## 2019-12-27 ENCOUNTER — APPOINTMENT (OUTPATIENT)
Dept: GENERAL RADIOLOGY | Facility: HOSPITAL | Age: 74
End: 2019-12-27
Attending: EMERGENCY MEDICINE
Payer: MEDICARE

## 2019-12-27 ENCOUNTER — HOSPITAL ENCOUNTER (OUTPATIENT)
Facility: HOSPITAL | Age: 74
Setting detail: OBSERVATION
Discharge: NURSING, REHAB OR LONG-TERM CARE | End: 2020-01-02
Attending: EMERGENCY MEDICINE | Admitting: HOSPITALIST
Payer: MEDICARE

## 2019-12-27 ENCOUNTER — HOSPITAL ENCOUNTER (EMERGENCY)
Facility: HOSPITAL | Age: 74
Discharge: NURSING, REHAB OR LONG-TERM CARE | End: 2019-12-27
Attending: EMERGENCY MEDICINE
Payer: MEDICARE

## 2019-12-27 ENCOUNTER — APPOINTMENT (OUTPATIENT)
Dept: CT IMAGING | Facility: HOSPITAL | Age: 74
End: 2019-12-27
Attending: EMERGENCY MEDICINE
Payer: MEDICARE

## 2019-12-27 VITALS
OXYGEN SATURATION: 99 % | TEMPERATURE: 97 F | HEART RATE: 78 BPM | RESPIRATION RATE: 25 BRPM | DIASTOLIC BLOOD PRESSURE: 80 MMHG | WEIGHT: 293 LBS | BODY MASS INDEX: 46 KG/M2 | SYSTOLIC BLOOD PRESSURE: 175 MMHG

## 2019-12-27 DIAGNOSIS — T07.XXXA MULTIPLE ABRASIONS: ICD-10-CM

## 2019-12-27 DIAGNOSIS — S80.02XA CONTUSION OF LEFT KNEE, INITIAL ENCOUNTER: ICD-10-CM
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
## 2019-12-27 DIAGNOSIS — E53.8 B12 DEFICIENCY: ICD-10-CM

## 2019-12-27 DIAGNOSIS — S00.83XA FOREHEAD CONTUSION, INITIAL ENCOUNTER: Primary | ICD-10-CM

## 2019-12-27 DIAGNOSIS — S00.83XD TRAUMATIC HEMATOMA OF FOREHEAD, SUBSEQUENT ENCOUNTER: ICD-10-CM

## 2019-12-27 DIAGNOSIS — B35.6 TINEA CRURIS: ICD-10-CM

## 2019-12-27 DIAGNOSIS — S06.0X0D CONCUSSION WITHOUT LOSS OF CONSCIOUSNESS, SUBSEQUENT ENCOUNTER: Primary | ICD-10-CM

## 2019-12-27 PROBLEM — S06.0X0A CONCUSSION WITH NO LOSS OF CONSCIOUSNESS: Status: ACTIVE | Noted: 2019-12-27

## 2019-12-27 LAB
ALBUMIN SERPL-MCNC: 3.5 G/DL (ref 3.4–5)
ALBUMIN/GLOB SERPL: 0.8 {RATIO} (ref 1–2)
ALP LIVER SERPL-CCNC: 98 U/L (ref 55–142)
ALT SERPL-CCNC: 18 U/L (ref 13–56)
ANION GAP SERPL CALC-SCNC: 2 MMOL/L (ref 0–18)
AST SERPL-CCNC: 22 U/L (ref 15–37)
BASOPHILS # BLD AUTO: 0.05 X10(3) UL (ref 0–0.2)
BASOPHILS NFR BLD AUTO: 0.4 %
BILIRUB SERPL-MCNC: 0.6 MG/DL (ref 0.1–2)
BUN BLD-MCNC: 18 MG/DL (ref 7–18)
BUN/CREAT SERPL: 20.7 (ref 10–20)
CALCIUM BLD-MCNC: 9.5 MG/DL (ref 8.5–10.1)
CHLORIDE SERPL-SCNC: 106 MMOL/L (ref 98–112)
CO2 SERPL-SCNC: 32 MMOL/L (ref 21–32)
CREAT BLD-MCNC: 0.87 MG/DL (ref 0.55–1.02)
DEPRECATED RDW RBC AUTO: 49.9 FL (ref 35.1–46.3)
EOSINOPHIL # BLD AUTO: 0.01 X10(3) UL (ref 0–0.7)
EOSINOPHIL NFR BLD AUTO: 0.1 %
ERYTHROCYTE [DISTWIDTH] IN BLOOD BY AUTOMATED COUNT: 14.7 % (ref 11–15)
GLOBULIN PLAS-MCNC: 4.4 G/DL (ref 2.8–4.4)
GLUCOSE BLD-MCNC: 121 MG/DL (ref 70–99)
HCT VFR BLD AUTO: 52.9 % (ref 35–48)
HGB BLD-MCNC: 16.8 G/DL (ref 12–16)
IMM GRANULOCYTES # BLD AUTO: 0.06 X10(3) UL (ref 0–1)
IMM GRANULOCYTES NFR BLD: 0.5 %
LYMPHOCYTES # BLD AUTO: 0.82 X10(3) UL (ref 1–4)
LYMPHOCYTES NFR BLD AUTO: 7.1 %
M PROTEIN MFR SERPL ELPH: 7.9 G/DL (ref 6.4–8.2)
MCH RBC QN AUTO: 29.6 PG (ref 26–34)
MCHC RBC AUTO-ENTMCNC: 31.8 G/DL (ref 31–37)
MCV RBC AUTO: 93.1 FL (ref 80–100)
MONOCYTES # BLD AUTO: 0.59 X10(3) UL (ref 0.1–1)
MONOCYTES NFR BLD AUTO: 5.1 %
NEUTROPHILS # BLD AUTO: 10.07 X10 (3) UL (ref 1.5–7.7)
NEUTROPHILS # BLD AUTO: 10.07 X10(3) UL (ref 1.5–7.7)
NEUTROPHILS NFR BLD AUTO: 86.8 %
OSMOLALITY SERPL CALC.SUM OF ELEC: 293 MOSM/KG (ref 275–295)
PLATELET # BLD AUTO: 110 10(3)UL (ref 150–450)
POTASSIUM SERPL-SCNC: 4.7 MMOL/L (ref 3.5–5.1)
RBC # BLD AUTO: 5.68 X10(6)UL (ref 3.8–5.3)
SODIUM SERPL-SCNC: 140 MMOL/L (ref 136–145)
WBC # BLD AUTO: 11.6 X10(3) UL (ref 4–11)

## 2019-12-27 PROCEDURE — 99220 INITIAL OBSERVATION CARE,LEVL III: CPT | Performed by: HOSPITALIST

## 2019-12-27 PROCEDURE — 70450 CT HEAD/BRAIN W/O DYE: CPT | Performed by: EMERGENCY MEDICINE

## 2019-12-27 PROCEDURE — 99285 EMERGENCY DEPT VISIT HI MDM: CPT

## 2019-12-27 PROCEDURE — 73560 X-RAY EXAM OF KNEE 1 OR 2: CPT | Performed by: EMERGENCY MEDICINE

## 2019-12-27 RX ORDER — NYSTATIN 100000 U/G
1 OINTMENT TOPICAL 2 TIMES DAILY
Qty: 60 G | Refills: 0 | Status: SHIPPED | OUTPATIENT
Start: 2019-12-27 | End: 2020-01-10

## 2019-12-27 RX ORDER — ACETAMINOPHEN 500 MG
1000 TABLET ORAL ONCE
Status: COMPLETED | OUTPATIENT
Start: 2019-12-27 | End: 2019-12-27

## 2019-12-27 RX ORDER — ONDANSETRON 2 MG/ML
4 INJECTION INTRAMUSCULAR; INTRAVENOUS
Status: DISCONTINUED | OUTPATIENT
Start: 2019-12-27 | End: 2019-12-28

## 2019-12-27 RX ORDER — TRAMADOL HYDROCHLORIDE 50 MG/1
100 TABLET ORAL ONCE
Status: COMPLETED | OUTPATIENT
Start: 2019-12-27 | End: 2019-12-27

## 2019-12-27 RX ORDER — SODIUM CHLORIDE 9 MG/ML
INJECTION, SOLUTION INTRAVENOUS CONTINUOUS
Status: DISCONTINUED | OUTPATIENT
Start: 2019-12-27 | End: 2019-12-28

## 2019-12-27 RX ORDER — METOCLOPRAMIDE HYDROCHLORIDE 5 MG/ML
10 INJECTION INTRAMUSCULAR; INTRAVENOUS ONCE
Status: DISCONTINUED | OUTPATIENT
Start: 2019-12-27 | End: 2019-12-27

## 2019-12-27 NOTE — ED NOTES
Ambulance arrived for patient. Patient departed in stable condition. Paperwork, patient clothing, and prescription handed to medics.

## 2019-12-27 NOTE — ED PROVIDER NOTES
Patient Seen in: [facility] Emergency Department      History   Patient presents with:  Fall    Stated Complaint: fall, hit head    HPI    49-year-old female who is wheelchair dependent and resides at [facility] assisted living reportedly fell her are as noted in HPI. Constitutional and vital signs reviewed. All other systems reviewed and negative except as noted above.     Physical Exam     ED Triage Vitals [12/27/19 1200]   BP (!) 168/85   Pulse 76   Resp 15   Temp 97.4 °F (36.3 °C)   Temp sr (41699)    Result Date: 12/27/2019  CONCLUSION:  1. Large left frontal scalp hematoma as described above. 2. No evidence of skull fracture. 3. No acute intracranial process.   Mild diffuse atrophy and white matter disease consistent with chronic small vesse

## 2019-12-27 NOTE — ED NOTES
[facility] called for patient transport back to [facility].  Eta 60 to 90 minutes (crew needs to retrieve bariatric cart)

## 2019-12-27 NOTE — ED NOTES
[facility] called by this RN to inform about patient returning to facility. Update given regarding patient's care in ED.

## 2019-12-27 NOTE — ED NOTES
Assumed care of patient at this time. Bedside report rcvd by RN Rosalia Varghese. Patient drowsy per norm. Requested to use restroom. Purewick placed by [employer].

## 2019-12-28 ENCOUNTER — APPOINTMENT (OUTPATIENT)
Dept: GENERAL RADIOLOGY | Facility: HOSPITAL | Age: 74
End: 2019-12-28
Attending: INTERNAL MEDICINE
Payer: MEDICARE

## 2019-12-28 PROBLEM — S00.83XD: Status: ACTIVE | Noted: 2019-12-28

## 2019-12-28 PROBLEM — S06.0X0D CONCUSSION WITHOUT LOSS OF CONSCIOUSNESS, SUBSEQUENT ENCOUNTER: Status: ACTIVE | Noted: 2019-12-28

## 2019-12-28 LAB
ATRIAL RATE: 78 BPM
GLUCOSE BLD-MCNC: 103 MG/DL (ref 70–99)
Q-T INTERVAL: 362 MS
QRS DURATION: 64 MS
QTC CALCULATION (BEZET): 393 MS
R AXIS: 9 DEGREES
T AXIS: 69 DEGREES
VENTRICULAR RATE: 71 BPM

## 2019-12-28 PROCEDURE — 73590 X-RAY EXAM OF LOWER LEG: CPT | Performed by: INTERNAL MEDICINE

## 2019-12-28 PROCEDURE — 73630 X-RAY EXAM OF FOOT: CPT | Performed by: INTERNAL MEDICINE

## 2019-12-28 PROCEDURE — 99225 SUBSEQUENT OBSERVATION CARE: CPT | Performed by: INTERNAL MEDICINE

## 2019-12-28 RX ORDER — BUSPIRONE HYDROCHLORIDE 5 MG/1
20 TABLET ORAL 2 TIMES DAILY
Status: DISCONTINUED | OUTPATIENT
Start: 2019-12-28 | End: 2020-01-02

## 2019-12-28 RX ORDER — CALCIUM CARBONATE/VITAMIN D3 250-3.125
2 TABLET ORAL 2 TIMES DAILY
Status: DISCONTINUED | OUTPATIENT
Start: 2019-12-28 | End: 2020-01-02

## 2019-12-28 RX ORDER — FLUOXETINE 10 MG/1
10 TABLET, FILM COATED ORAL EVERY MORNING
Status: DISCONTINUED | OUTPATIENT
Start: 2019-12-28 | End: 2020-01-02

## 2019-12-28 RX ORDER — IBUPROFEN 400 MG/1
400 TABLET ORAL EVERY 4 HOURS PRN
Status: DISCONTINUED | OUTPATIENT
Start: 2019-12-28 | End: 2020-01-02

## 2019-12-28 RX ORDER — SODIUM CHLORIDE, SODIUM LACTATE, POTASSIUM CHLORIDE, CALCIUM CHLORIDE 600; 310; 30; 20 MG/100ML; MG/100ML; MG/100ML; MG/100ML
INJECTION, SOLUTION INTRAVENOUS ONCE
Status: COMPLETED | OUTPATIENT
Start: 2019-12-28 | End: 2019-12-28

## 2019-12-28 RX ORDER — PREGABALIN 100 MG/1
100 CAPSULE ORAL 3 TIMES DAILY
Status: DISCONTINUED | OUTPATIENT
Start: 2019-12-28 | End: 2020-01-02

## 2019-12-28 RX ORDER — ASPIRIN 81 MG/1
81 TABLET ORAL DAILY
Status: DISCONTINUED | OUTPATIENT
Start: 2019-12-28 | End: 2020-01-02

## 2019-12-28 RX ORDER — ONDANSETRON 2 MG/ML
4 INJECTION INTRAMUSCULAR; INTRAVENOUS EVERY 6 HOURS PRN
Status: DISCONTINUED | OUTPATIENT
Start: 2019-12-28 | End: 2020-01-02

## 2019-12-28 RX ORDER — ALLOPURINOL 300 MG/1
300 TABLET ORAL DAILY
Status: DISCONTINUED | OUTPATIENT
Start: 2019-12-28 | End: 2020-01-02

## 2019-12-28 RX ORDER — FLUOXETINE HYDROCHLORIDE 20 MG/1
20 CAPSULE ORAL EVERY MORNING
Status: DISCONTINUED | OUTPATIENT
Start: 2019-12-28 | End: 2020-01-02

## 2019-12-28 RX ORDER — DEXTROSE MONOHYDRATE 25 G/50ML
50 INJECTION, SOLUTION INTRAVENOUS
Status: DISCONTINUED | OUTPATIENT
Start: 2019-12-28 | End: 2020-01-02

## 2019-12-28 RX ORDER — TRAZODONE HYDROCHLORIDE 100 MG/1
100 TABLET ORAL NIGHTLY PRN
Status: DISCONTINUED | OUTPATIENT
Start: 2019-12-28 | End: 2020-01-02

## 2019-12-28 RX ORDER — PRAZOSIN HYDROCHLORIDE 1 MG/1
5 CAPSULE ORAL NIGHTLY
Status: DISCONTINUED | OUTPATIENT
Start: 2019-12-28 | End: 2020-01-02

## 2019-12-28 RX ORDER — ACETAMINOPHEN 325 MG/1
650 TABLET ORAL EVERY 6 HOURS PRN
Status: DISCONTINUED | OUTPATIENT
Start: 2019-12-28 | End: 2020-01-02

## 2019-12-28 RX ORDER — IBUPROFEN 200 MG
200 TABLET ORAL EVERY 4 HOURS PRN
Status: DISCONTINUED | OUTPATIENT
Start: 2019-12-28 | End: 2020-01-02

## 2019-12-28 RX ORDER — PRAVASTATIN SODIUM 20 MG
20 TABLET ORAL NIGHTLY
Status: DISCONTINUED | OUTPATIENT
Start: 2019-12-28 | End: 2020-01-02

## 2019-12-28 RX ORDER — CYANOCOBALAMIN 1000 UG/ML
1000 INJECTION INTRAMUSCULAR; SUBCUTANEOUS ONCE
Status: DISCONTINUED | OUTPATIENT
Start: 2019-12-28 | End: 2019-12-28

## 2019-12-28 RX ORDER — METOCLOPRAMIDE HYDROCHLORIDE 5 MG/ML
5 INJECTION INTRAMUSCULAR; INTRAVENOUS EVERY 8 HOURS PRN
Status: DISCONTINUED | OUTPATIENT
Start: 2019-12-28 | End: 2020-01-02

## 2019-12-28 RX ORDER — LOSARTAN POTASSIUM 50 MG/1
50 TABLET ORAL EVERY MORNING
Status: DISCONTINUED | OUTPATIENT
Start: 2019-12-28 | End: 2020-01-02

## 2019-12-28 RX ORDER — TRAMADOL HYDROCHLORIDE 50 MG/1
50 TABLET ORAL EVERY 6 HOURS PRN
Status: DISCONTINUED | OUTPATIENT
Start: 2019-12-28 | End: 2020-01-02

## 2019-12-28 RX ORDER — ZOLPIDEM TARTRATE 5 MG/1
5 TABLET ORAL NIGHTLY
Status: DISCONTINUED | OUTPATIENT
Start: 2019-12-28 | End: 2020-01-02

## 2019-12-28 RX ORDER — PANTOPRAZOLE SODIUM 20 MG/1
20 TABLET, DELAYED RELEASE ORAL
Status: DISCONTINUED | OUTPATIENT
Start: 2019-12-28 | End: 2020-01-02

## 2019-12-28 RX ORDER — IBUPROFEN 600 MG/1
600 TABLET ORAL EVERY 4 HOURS PRN
Status: DISCONTINUED | OUTPATIENT
Start: 2019-12-28 | End: 2020-01-02

## 2019-12-28 RX ORDER — CYCLOBENZAPRINE HCL 10 MG
10 TABLET ORAL 2 TIMES DAILY PRN
Status: DISCONTINUED | OUTPATIENT
Start: 2019-12-28 | End: 2019-12-28

## 2019-12-28 RX ORDER — FLUOXETINE HYDROCHLORIDE 20 MG/1
40 CAPSULE ORAL NIGHTLY
Status: DISCONTINUED | OUTPATIENT
Start: 2019-12-28 | End: 2020-01-02

## 2019-12-28 RX ORDER — NYSTATIN 100000 U/G
1 OINTMENT TOPICAL 2 TIMES DAILY
Status: DISCONTINUED | OUTPATIENT
Start: 2019-12-28 | End: 2020-01-02

## 2019-12-28 NOTE — PROGRESS NOTES
PROBLEM: FALL    ASSESSMENT: PT IS ALERT TIMES 4, ANXIOUS AT TIMES, HAS A LEFT SIDED HEMATOMA TO HER FOREHEAD AND LEFT EYE, ON RA, ABDIAZIZ WITH CPAP, SR ON TELE, EXCORIATED UNDER ABDOMINAL FOLDS AND BURTON AREA, INCONTINENT, HAS R BKA AND SEVERAL FINGER AMPUTATI

## 2019-12-28 NOTE — ED PROVIDER NOTES
Patient Seen in: [facility] Emergency Department      History   Patient presents with:  Pain    Stated Complaint: Patient was here earlier for fall, back for pain    HPI    Patient was to the emergency department earlier today.   She is wheelchair de METATARSAL+TOE,SINGLE Left     left great toe    • APPENDECTOMY     • CHOLECYSTECTOMY     • FINGER AMPUTATION/ EXTRA DIGIT REMOVAL Right 6/2/2016    Performed by Hugo Ziegler MD at [address]   • TONSILLECTOMY                      Social History    Tobac Value    Glucose 121 (*)     BUN/CREA Ratio 20.7 (*)     A/G Ratio 0.8 (*)     All other components within normal limits   CBC W/ DIFFERENTIAL - Abnormal; Notable for the following components:    WBC 11.6 (*)     RBC 5.68 (*)     HGB 16.8 (*)     HCT 52.9 diagnosis)  Traumatic hematoma of forehead, subsequent encounter    Disposition:  Admit  12/27/2019 10:16 pm    Follow-up:  No follow-up provider specified.       Medications Prescribed:  Current Discharge Medication List                   Present on Admiss

## 2019-12-28 NOTE — RESPIRATORY THERAPY NOTE
ABDIAZIZ - Equipment Use Daily Summary:  · Set Mode CFLEX  · Usage in hours: 0:40  · 90% Pressure (EPAP) level: 12.5  · 90% Insp Pressure (IPAP):   · AHI: 13.0  · Supplemental Oxygen:   · Comments:

## 2019-12-28 NOTE — PROGRESS NOTES
[facility]  Progress Note    Bharathi Ewing Patient Status:  Observation    1945 MRN [id number]   [facility] 3NE-A Attending Stanley Fleischer, MD   Hosp Day # 0 PCP Willam Warren MD     CC: Fall at assisted living and close head i Has had prior toe amputations on the left foot great toe and left index finger and right tip of middle finger  Pulses: 2+ and symmetric  Neurologic: Awake,alert,nonfocal  Psych: Mood and affect appears normal      Labs:   Lab Results   Component Value Date 1. Large left frontal scalp hematoma as described above. 2. No evidence of skull fracture. 3. No acute intracranial process.   Mild diffuse atrophy and white matter disease consistent with chronic small vessel ischemic changes that are similar to the pr hypertension  1. Continue home medication  2. Follow blood pressure  3. Non insulin-dependent diabetes mellitus type 2  1. Hyperglycemia protocol  4. Hyperlipidemia  1. Statin  5. Anxiety  1. Continue home medication  6.  Morbid obesity with a BMI of 40.02

## 2019-12-28 NOTE — H&P
[facility] HOSPITALIST  History and Physical     [facility] Patient Status:  Observation    1945 MRN [id number]   [facility] 3NE-A Attending Kevan Varma MD   Hosp Day # 0 PCP Italia Pizano MD     Chief Complaint: concussion by Mariya Fournier MD at [facility] MAIN OR   • TONSILLECTOMY         Social History:  reports that she has never smoked. She has never used smokeless tobacco. She reports that she does not drink alcohol or use drugs.     Family History:   Family History   Problem R 11  OMEPRAZOLE 20 MG Oral Capsule Delayed Release, TAKE ONE CAPSULE BY MOUTH EVERY MORNING, Disp: 30 capsule, Rfl: 10  GLIPIZIDE ER 10 MG Oral Tablet 24 Hr, TAKE ONE TABLET BY MOUTH EVERY DAY, Disp: 90 tablet, Rfl: 1  clotrimazole 1 % External Cream, Apply Alert and oriented x 3. HEENT: left frontal scalp ecchymosis with hematoma, left eye ecchymosis and swelling. Moist mucous membranes. Neck: No lymphadenopathy. No JVD. No carotid bruits. Respiratory: Clear to auscultation bilaterally. No wheezes.  No rh

## 2019-12-28 NOTE — ED INITIAL ASSESSMENT (HPI)
Patient presents to ED with complaints of head pain after fall that occurred earlier today. Patient was seen here for fall earlier today. Patient states that she came for pain control and because she feels dizzy when she sits up.  Patient is from [facility]

## 2019-12-29 ENCOUNTER — APPOINTMENT (OUTPATIENT)
Dept: ULTRASOUND IMAGING | Facility: HOSPITAL | Age: 74
End: 2019-12-29
Attending: INTERNAL MEDICINE
Payer: MEDICARE

## 2019-12-29 LAB
GLUCOSE BLD-MCNC: 108 MG/DL (ref 70–99)
GLUCOSE BLD-MCNC: 121 MG/DL (ref 70–99)
GLUCOSE BLD-MCNC: 129 MG/DL (ref 70–99)
GLUCOSE BLD-MCNC: 134 MG/DL (ref 70–99)
GLUCOSE BLD-MCNC: 164 MG/DL (ref 70–99)

## 2019-12-29 PROCEDURE — 93925 LOWER EXTREMITY STUDY: CPT | Performed by: INTERNAL MEDICINE

## 2019-12-29 PROCEDURE — 99225 SUBSEQUENT OBSERVATION CARE: CPT | Performed by: HOSPITALIST

## 2019-12-29 NOTE — PROGRESS NOTES
[facility]  Progress Note    Clorinda Burkitt Patient Status:  Observation    1945 MRN [id number]   [facility] 3NE-A Attending Carlos Cortez MD   Hosp Day # 0 PCP Jose Murray MD     CC: Fall at assisted living and close head i TECHNIQUE:  Noncontrast CT scanning is performed through the brain. Dose reduction techniques were used.  Dose information is transmitted to the  [facility]) NRDR ([address]) which includes the Dose Index   Re mg, Oral, QAM AC  Calcium Carbonate-Vitamin D 250-125 MG-UNIT per tab TABS 2 tablet, 2 tablet, Oral, BID  aspirin EC tab 81 mg, 81 mg, Oral, Daily  allopurinol (ZYLOPRIM) tab 300 mg, 300 mg, Oral, Daily  traMADol HCl (ULTRAM) tab 50 mg, 50 mg, Oral, Q6H [place] left leg pain  1. Xray without acute findings.   11. Fall precautions, PT OT eval      Quality:  · DVT Prophylaxis: SCD  · CODE status: full  · Beth: no  · Central line: no    Will the patient be referred to [facility] on discharge?:  To be decided  Estimated ori

## 2019-12-29 NOTE — OCCUPATIONAL THERAPY NOTE
OCCUPATIONAL THERAPY EVALUATION - INPATIENT     Room Number: 5427/9345-P  Evaluation Date: 12/29/2019  Type of Evaluation: Initial  Presenting Problem: concussion    Physician Order: IP Consult to Occupational Therapy  Reason for Therapy: ADL/IADL Dysfunct 10/26/2014   • Hyperlipidemia    • MRSA infection 6/2/2016   • Osteoarthritis    • Osteomyelitis of finger ([facility] [address] 2/25/2015   • Pneumonia due to organism    • Sleep apnea    • Ulcer of finger [facility])        Past Surgical History  Past Surgical History:   Proc difficulty  Initiation: appears intact  Safety Judgement:  decreased awareness of need for assistance and decreased awareness of need for safety  Awareness of Errors:  assistance required to identify errors made  Awareness of Deficits:  decreased awareness simulate her set up at home  By having WC placed directly in front of her and pulling up; OT educated patient that she is at risk for skin tears using WC b/c of legrests .  Patient agreed to try drip arm recliner, she was unable to pull herself up enough to that patients with this level of impairment often benefit from SNF. Subacute rehab is recommended for 7-10  days. After this period of rehabilitation patient should achieve mod I level in ADLs at [facility] level.       Patient Complexity  Occupational Profile/M

## 2019-12-29 NOTE — PLAN OF CARE
Patient states head pain is better today, but has c/o pain in legs. VSS this shift. PRN meds given for pain control.  updated, she was paged with xray with result.

## 2019-12-29 NOTE — PROGRESS NOTES
12/29/19 1219   Clinical Encounter Type   Visited With Patient   Routine Visit Introduction   Continue Visiting No   Oriental orthodox Encounters   Spiritual Requests During Visit / Hospitalization [address]

## 2019-12-29 NOTE — RESPIRATORY THERAPY NOTE
ABDIAZIZ - Equipment Use Daily Summary:  · Set Mode CFLEX  · Usage in hours: 9:35  · 90% Pressure (EPAP) level: 9.5  · 90% Insp Pressure (IPAP):   · AHI: 0.6  · Supplemental Oxygen:   · Comments:

## 2019-12-29 NOTE — PLAN OF CARE
Assumed care of patient at [address]. Monitor on tele-NSR,  on CPAP at . C/o pain prn tylenol and ultram provided. Fall precautions in place. Plan for US LE, PT/OT.  Will continue to monitor

## 2019-12-29 NOTE — PHYSICAL THERAPY NOTE
PHYSICAL THERAPY EVALUATION - INPATIENT     Room Number: 3182/6391-H  Evaluation Date: 12/29/2019  Type of Evaluation: Initial  Physician Order: PT Eval and Treat    Presenting Problem: fell out of w/c /loss of consciousness   Reason for Therapy:  Mob by Alize Tiwari MD at [address]  Type of Home: [facility] supporitive living )   Home Layout: One level                Lives With: Alone             Prior Level of [place]: patient states prior to admit I hospital room?: Total   -   Climbing 3-5 steps with a railing?: Total       AM-PAC Score:  Raw Score: 9   Approx Degree of Impairment: 81.38%   Standardized Score (AM-PAC Scale): 30.55   CMS Modifier (G-Code): CM    FUNCTIONAL ABILITY STATUS  Gait Assessme below baseline and would benefit from skilled inpatient PT to address the above deficits to assist patient in returning to prior to level of function.   DISCHARGE RECOMMENDATIONS  PT Discharge Recommendations: Sub-acute rehabilitation(ELOS 7-10)    PLAN  PT

## 2019-12-29 NOTE — PLAN OF CARE
Face still edematous, swollen, weeping. Pain controlled. Ibuprofen given x1 today  Pt RA during day. 2L with naps and CPAP at night  Nystatin added to folds with bath.    No insulin needed, BS controlled  Pt worked with PT and is recommending ROSLYN  Pt tearfu

## 2019-12-30 LAB
GLUCOSE BLD-MCNC: 117 MG/DL (ref 70–99)
GLUCOSE BLD-MCNC: 121 MG/DL (ref 70–99)
GLUCOSE BLD-MCNC: 124 MG/DL (ref 70–99)
GLUCOSE BLD-MCNC: 160 MG/DL (ref 70–99)

## 2019-12-30 PROCEDURE — 99225 SUBSEQUENT OBSERVATION CARE: CPT | Performed by: HOSPITALIST

## 2019-12-30 NOTE — RESPIRATORY THERAPY NOTE
ABDIAZIZ Equipment Usage Summary :            Set Mode :AUTO CPAP W FLEX          Usage in Hours:8;29          90% Pressure (EPAP) : 9           90% Insp Pressure (IPAP);           AHI : 0.5          Supplemental Oxygen :      LPM

## 2019-12-30 NOTE — CM/SW NOTE
4:52pm  MSW spoke to Jo from [facility] who states admin will be out on Tuesday to eval patient and talk to patient about her going to eva Rios states that patient will not lose her apartment).

## 2019-12-30 NOTE — CM/SW NOTE
4:12pm  TYREE met with patient, she does not want to go to [facility] because she is in Obs status and she does not want to lose her apartment at [facility] since that is paid by [facility] and medicaid will not pay for two housing options at once.      TYREE doe

## 2019-12-30 NOTE — PROGRESS NOTES
[facility]  Progress Note    Bharathi Ewing Patient Status:  Observation    1945 MRN [id number]   [facility] 3NE-A Attending Stanley Fleischer, MD   Hosp Day # 0 PCP Willam Warren MD     CC: Fall at assisted living and close head i hematoma. TECHNIQUE:  Noncontrast CT scanning is performed through the brain. Dose reduction techniques were used.  Dose information is transmitted to the [facility] ([facility]) Merly Thomas 35 ([address]) which includes the [facility] mg, Oral, Nightly  Pantoprazole Sodium (PROTONIX) EC tab 20 mg, 20 mg, Oral, QAM AC  Calcium Carbonate-Vitamin D 250-125 MG-UNIT per tab TABS 2 tablet, 2 tablet, Oral, BID  aspirin EC tab 81 mg, 81 mg, Oral, Daily  allopurinol (ZYLOPRIM) tab 300 mg, 300 mg protocol  8. GERD–PPI  9. Charcot-Rima-Tooth syndrome  10. Left foot pain, left leg pain  1. Xray without acute findings, US negative  11.  Fall precautions, PT OT eval      Quality:  · DVT Prophylaxis: SCD  · CODE status: full  · Beth: no  · Central line

## 2019-12-30 NOTE — H&P
[facility] HOSPITALIST  History and Physical     Grzegorz Carrier Patient Status:  Emergency    1945 MRN [id number]   Location [address] [facility] Attending Lona Brown MD   Hosp Day # 0 PCP Roger Jalloh MD     Chief Complaint: APPENDECTOMY  No date: CHOLECYSTECTOMY  No date: TONSILLECTOMY    Social History:  reports that she has never smoked. She has never used smokeless tobacco. She reports that she does not drink alcohol or use drugs.     Family History:   Family History   Prob DAY FOR PAIN Disp: 180 tablet Rfl: 10   pregabalin (LYRICA) 100 MG Oral Cap Take 1 capsule (100 mg total) by mouth 3 (three) times daily.  Disp: 90 capsule Rfl: 0   aspirin (ASPIR-LOW) 81 MG Oral Tab EC TAKE ONE TABLET BY MOUTH EVERY MORNING Disp: 30 tablet Disp:  Rfl:    Cholecalciferol (VITAMIN D3) 05693 units Oral Cap Take by mouth.  Disp:  Rfl:    Losartan Potassium 50 MG Oral Tab TAKE ONE TABLET BY MOUTH EVERY MORNING Disp: 30 tablet Rfl: 9       Review of Systems:   A comprehensive 14 point review of sy 1. Sepsis  1. IVF  2. BCX  2. PNA  1. Rocpehin/azithro  3. Trop elev  1. Likely NSTEMI Type II  2. Echo  3. Cards eval  4. Trend trop  5. CTA pending  4. ? Fluid OL  1. BNP elevated  2. Echo  3. Lasix given in ED  5. Hypertension  6. Diabetes  1.  Insul 97

## 2019-12-30 NOTE — PLAN OF CARE
Assumed pt care this morning. Aa/ox4. Breathing unlabored. Discharge planning in progress. Social work/case management to assist with discharge plan.

## 2019-12-31 LAB
GLUCOSE BLD-MCNC: 116 MG/DL (ref 70–99)
GLUCOSE BLD-MCNC: 123 MG/DL (ref 70–99)
GLUCOSE BLD-MCNC: 146 MG/DL (ref 70–99)
GLUCOSE BLD-MCNC: 154 MG/DL (ref 70–99)

## 2019-12-31 PROCEDURE — 99225 SUBSEQUENT OBSERVATION CARE: CPT | Performed by: HOSPITALIST

## 2019-12-31 NOTE — RESPIRATORY THERAPY NOTE
ABDIAZIZ Equipment Usage Summary :            Set Mode :AUTO CPAP W FLEX          Usage in Hours:3;16          90% Pressure (EPAP) : 9           90% Insp Pressure (IPAP);           AHI : 2.1          Supplemental Oxygen :      LPM

## 2019-12-31 NOTE — PLAN OF CARE
Patient is a&ox4.  and RA. Tele and NSR. Pending insurance auth to [facility]. Hopeful for discharge this afternoon. Complaints of R knee pain, Motrin PO PRN per MAR. Denies any other complaints. Repositioned in bed.  Staff will continue to monitor briana

## 2019-12-31 NOTE — CM/SW NOTE
Carollee Boast pendingPerrin Res states they do not cover pt for [employer] registration called to update facesheet.     3:56pm  MSW spoke to Lesley Ames from [facility]-he will talk to his admin staff.  If he can accept he will call MSW or RN and alyssa

## 2019-12-31 NOTE — CM/SW NOTE
9:39am  MSW called [facility] at [facility] [phone] and left message with Emory Thayer to discuss patient coming to [facility] and not losing her apartment.  Message left at 9:40am.    DON req, updates in ECIN to [facility] AT [facility]    11:15am  MSW spoke to [employer]

## 2019-12-31 NOTE — DISCHARGE SUMMARY
[facility] HOSPITAL[facility]  DISCHARGE SUMMARY     Katie Hernandez Patient Status:  Observation    1945 MRN [id number]   [facility] 3NE-A Attending Inocente Homans, MD   Hosp Day # 0 PCP Manda Schwarz MD     Date of Admission: 2019  Date 10     ASPIR-LOW 81 MG Tbec  Generic drug:  aspirin      TAKE ONE TABLET BY MOUTH EVERY MORNING   Quantity:  30 tablet  Refills:  10     busPIRone HCl 10 MG Tabs  Commonly known as:  BUSPAR      Take 20 mg by mouth 2 (two) times daily.    Refills:  0     cl times daily.    Quantity:  60 g  Refills:  0     omeprazole 20 MG Cpdr  Commonly known as:  PRILOSEC      TAKE ONE CAPSULE BY MOUTH EVERY MORNING   Quantity:  30 capsule  Refills:  10     Oyster Shell Calcium/D 500-200 MG-UNIT Tabs      Take 1 tablet by renaldo edema.  -----------------------------------------------------------------------------------------------  PATIENT DISCHARGE INSTRUCTIONS: See electronic chart    Mariana Norwood MD    Time spent:  > 30 minutes

## 2019-12-31 NOTE — PLAN OF CARE
Patient is A&Ox 4. Calm and cooperative. Facial swelling L>R. Hematoma to L face. L eye draining. VSS. On tele-NSR . On RA. CPAP with 2L O2 at night. IV-SL. C/o R extremity pain, motrin prn given. Electrolyte protocol. Accuchecks.   Kathi Cortez in relaxation techniques  - Monitor for opioid side effects  - Notify MD/LIP if interventions unsuccessful or patient reports new pain  - Anticipate increased pain with activity and pre-medicate as appropriate  Outcome: Progressing     Problem: SAFETY ADULT -

## 2020-01-01 LAB
GLUCOSE BLD-MCNC: 111 MG/DL (ref 70–99)
GLUCOSE BLD-MCNC: 134 MG/DL (ref 70–99)
GLUCOSE BLD-MCNC: 160 MG/DL (ref 70–99)
GLUCOSE BLD-MCNC: 162 MG/DL (ref 70–99)

## 2020-01-01 PROCEDURE — 99225 SUBSEQUENT OBSERVATION CARE: CPT | Performed by: INTERNAL MEDICINE

## 2020-01-01 NOTE — DISCHARGE SUMMARY
[facility] [place] HOSPITALIST  DISCHARGE SUMMARY     Merdis Leaver Patient Status:  Observation    1945 MRN [id number]   [facility] 3NE-A Attending Rox Marroquin MD   Hosp Day # 0 PCP Kalyn Badillo MD     Date of Admission: 2019  Date changed:    · medication strength  · how much to take      Take 1 tablet (100 mg total) by mouth every morning.    Stop taking on:  February 1, 2020  Quantity:  30 tablet  Refills:  0        CONTINUE taking these medications      Instructions Prescription d needed.  To open areas on legs   Refills:  0     MYRBETRIQ 50 MG Tb24  Generic drug:  Mirabegron ER      TAKE ONE TABLET BY MOUTH DAILY   Quantity:  90 tablet  Refills:  3     nystatin 637927 UNIT/GM Oint  Commonly known as:  MYCOSTATIN      Apply 1 Applica [69-87] 87  Resp:  [16-20] 16  BP: (437-191)/(62-94) 169/94      -----------------------------------------------------------------------------------------------  PATIENT DISCHARGE INSTRUCTIONS: See electronic chart    Sherryle Linea MD    Time spent:  > 3

## 2020-01-01 NOTE — RESPIRATORY THERAPY NOTE
ABDIAZIZ - Equipment Use Daily Summary:  · Set Mode   · Usage in hours:   · 90% Pressure (EPAP) level:   · 90% Insp Pressure (IPAP):   · AHI:   · Supplemental Oxygen:  · Comments: DID NOT USE

## 2020-01-01 NOTE — PLAN OF CARE
Patient alert and oriented x4. Tejon. NSR on tele. Pt complains of mild headache. PRN tylenol. Swelling and bruising to face from fall. Baseline numbness and tingling to all extremities. R BKA. Blood sugar monitored as per EMELY Rodas.   Resting co

## 2020-01-01 NOTE — PROGRESS NOTES
[facility]  Progress Note    Laith Byrne Patient Status:  Observation    1945 MRN [id number]   [facility] 3NE-A Attending Christofer Perez MD   Hosp Day # 0 PCP Gabriel Lanier MD     CC: Fall at assisted living and close head i hit head with left-sided scalp hematoma. TECHNIQUE:  Noncontrast CT scanning is performed through the brain. Dose reduction techniques were used.  Dose information is transmitted to the [facility] ([facility] [employer]) Merly Thomas [address] [facility] Data R QAM  FLUoxetine HCl (PROZAC) cap 40 mg, 40 mg, Oral, Nightly  Pantoprazole Sodium (PROTONIX) EC tab 20 mg, 20 mg, Oral, QAM AC  Calcium Carbonate-Vitamin D 250-125 MG-UNIT per tab TABS 2 tablet, 2 tablet, Oral, BID  aspirin EC tab 81 mg, 81 mg, Oral, Daily apnea–ABDIAZIZ protocol  8. GERD–PPI  9. Charcot-Rima-Tooth syndrome  10. Left foot pain, left leg pain  1. Xray without acute findings, US negative  11.  Fall precautions, PT OT eval      Quality:  · DVT Prophylaxis: SCD  · CODE status: full  · Beth: no  · Ce

## 2020-01-01 NOTE — PLAN OF CARE
Assumed pt care at [address] [facility]. A&Ox4, [place]. VSS. Room air. NSR on tele. Baseline numbness/tingling to extremities. Severe swelling and bruising noted to left side of face and rt eye. Pt reports headache, addressed with tylenol. Ismael N/V.  L AC PIV SL.   Ca

## 2020-01-02 VITALS
DIASTOLIC BLOOD PRESSURE: 94 MMHG | HEART RATE: 87 BPM | TEMPERATURE: 99 F | BODY MASS INDEX: 43 KG/M2 | RESPIRATION RATE: 16 BRPM | SYSTOLIC BLOOD PRESSURE: 169 MMHG | WEIGHT: 291.31 LBS | OXYGEN SATURATION: 92 %

## 2020-01-02 LAB
GLUCOSE BLD-MCNC: 120 MG/DL (ref 70–99)
GLUCOSE BLD-MCNC: 189 MG/DL (ref 70–99)

## 2020-01-02 PROCEDURE — 99217 OBSERVATION CARE DISCHARGE: CPT | Performed by: INTERNAL MEDICINE

## 2020-01-02 RX ORDER — LOSARTAN POTASSIUM 100 MG/1
100 TABLET ORAL EVERY MORNING
Qty: 30 TABLET | Refills: 0 | Status: SHIPPED | OUTPATIENT
Start: 2020-01-02 | End: 2020-01-10 | Stop reason: DRUGHIGH

## 2020-01-02 RX ORDER — LOSARTAN POTASSIUM 100 MG/1
100 TABLET ORAL EVERY MORNING
Status: DISCONTINUED | OUTPATIENT
Start: 2020-01-03 | End: 2020-01-02

## 2020-01-02 NOTE — PLAN OF CARE
Assumed patient care @9123. Patient is a&ox4. On RA.  NSR on tele. C/o of back pain 5/10, given po motrin prn. VSS. QID accucheck. Electrolyte protocol. PIV left a/c- saline locked. PT/OT worked with patient today, recommending home health.   Rodger

## 2020-01-02 NOTE — OCCUPATIONAL THERAPY NOTE
OCCUPATIONAL THERAPY TREATMENT NOTE - INPATIENT     Room Number: 8732/9700-I  Session: 1   Number of Visits to Meet Established Goals: 3    Presenting Problem: concussion    History related to current admission: Admitted 12/27 from [facility] History:   Procedure Laterality Date   • AMPUTATE METACARPAL+FINGER Left     left index finger   • AMPUTATE METACARPAL+FINGER Right     tip of right middle finger    • AMPUTATION LOW LEG THRU TIB/FIB  7/2009    R leg below knee   • AMPUTATION METATARSAL+TO wheelchair. Transfer wheelchair to commode over toilet CGA. Min (A) for peter care with toileting; pt uses toileting aide at home for improved reach and able to simulate with mod (I). Transfer to wheelchair CGA. transfer wheelchair <> EOB CGA.  Pt with no qu with good judgement/safety-met 1/2     UE Exercise Program Goal  Patient will be supervision with bilateral AROM HEP (home exercise program). -ongoing

## 2020-01-02 NOTE — PROGRESS NOTES
[facility]  Progress Note    Radha Arevalo Patient Status:  Observation    1945 MRN [id number]   [facility] 3NE-A Attending Allyn Ziegler MD   Hosp Day # 0 PCP Nila Owen MD     CC: Fall at assisted living and close head i (MINIPRESS) cap 5 mg, 5 mg, Oral, Nightly  Zolpidem Tartrate (AMBIEN) tab 5 mg, 5 mg, Oral, Nightly  losartan Potassium (COZAAR) tab 50 mg, 50 mg, Oral, QAM  Pravastatin Sodium (PRAVACHOL) tab 20 mg, 20 mg, Oral, Nightly  FLUoxetine HCl (PROZAC) cap 20 mg, teresaal noted  2. Fall precautions  2. Essential hypertension  1. Continue home medication, increase losartan  2. Follow blood pressure  3. Non insulin-dependent diabetes mellitus type 2  1. Hyperglycemia protocol  4. Hyperlipidemia  1. Statin  5. Anxiety  1.

## 2020-01-02 NOTE — PHYSICAL THERAPY NOTE
PHYSICAL THERAPY TREATMENT NOTE - INPATIENT    Room Number: 5724/0239-G     Session: 1   Number of Visits to Meet Established Goals: 3    Presenting Problem: fell out of w/c /loss of consciousness     Problem List  Principal Problem:    Concussion without Bearing Restriction: (R BKA )        R Lower Extremity: Other (Comment)(R BKA)       PAIN ASSESSMENT   Ratin  Location: pt denies pain   Management Techniques:  Activity promotion;Repositioning    BALANCE back to room and performed w/c>bed t/f c supervision. Pt t/f back to w/c c supervision. Pt left in room, needs met, OT present. Extensive d/w pt regarding home set up and any requirements . Pt reports she feels close to baseline level of mobility.  D/c o

## 2020-01-02 NOTE — CM/SW NOTE
MSW spoke to OT after PT/OT eval. They state patient can return to her SLF at [facility]. MSW left message for stevan at [facility] to set up dc time. MSW got a VM from [address] [place] [address] staff that patient can return at any time.  Bedside Rn updated by

## 2020-01-02 NOTE — PLAN OF CARE
Patient is A&Ox 4. Calm and cooperative. Facial swelling. Hematoma to L face. VSS. On tele-NSR . On RA. Refused CPAP during night d/t pain with facial swelling. 2L O2 NC put on. IV-SL.  C/o headache, tylenol prn given. Electrolyte protocol.   A

## 2020-01-02 NOTE — CM/SW NOTE
MSW spoke to OT after PT/OT eval. They state patient can return to her SLF at [facility]. MSW left message for stevan at [facility] to set up dc time. Enedelia Colton will-[phone]  BLS on chart.

## 2020-01-02 NOTE — RESPIRATORY THERAPY NOTE
ABDIAZIZ - Equipment Use Daily Summary:                  . Set Mode:                . Usage in hours:                . 90% Pressure (EPAP) level:                . 90% Insp. Pressure (IPAP): Edward Paez AHI:                .  Supplemental Oxygen:    LPM

## 2020-01-03 NOTE — PLAN OF CARE
NURSING DISCHARGE NOTE    Discharged Other, (see nursing note) to [facility] via Ambulance. Accompanied by Support staff  Belongings Taken by patient/family.   Discharge paperwork discussed with patient upon discharge, patient verbalized understanding, al

## 2020-01-06 ENCOUNTER — TELEPHONE (OUTPATIENT)
Dept: INTERNAL MEDICINE CLINIC | Facility: CLINIC | Age: 75
End: 2020-01-06

## 2020-01-06 RX ORDER — CLOTRIMAZOLE 1 %
CREAM (GRAM) TOPICAL
Qty: 60 G | Refills: 1 | Status: SHIPPED | OUTPATIENT
Start: 2020-01-06 | End: 2020-02-21

## 2020-01-06 NOTE — TELEPHONE ENCOUNTER
Lm for pt (Tanner Lanier per HIPAA) to inform TB schedule filling up quickly this week. Scheduled HFU on 1/10 @3:00pm. To call back at the office to confirm received message/ HFU appt, or if any further questions.

## 2020-01-06 NOTE — TELEPHONE ENCOUNTER
LOV:12/10/19 TB  FOV:3/13/20  LAST RX:not filled in office   LAST LABS:1/2/20 poct glucose   PER PROTOCOL: to provider

## 2020-01-07 NOTE — TELEPHONE ENCOUNTER
Form received from TB from [employer] to be faxed back, faxed back confirmation received and sent to scan

## 2020-01-10 ENCOUNTER — OFFICE VISIT (OUTPATIENT)
Dept: INTERNAL MEDICINE CLINIC | Facility: CLINIC | Age: 75
End: 2020-01-10
Payer: MEDICARE

## 2020-01-10 VITALS — RESPIRATION RATE: 18 BRPM | SYSTOLIC BLOOD PRESSURE: 138 MMHG | HEART RATE: 68 BPM | DIASTOLIC BLOOD PRESSURE: 72 MMHG

## 2020-01-10 DIAGNOSIS — S00.83XD TRAUMATIC HEMATOMA OF FOREHEAD, SUBSEQUENT ENCOUNTER: Primary | ICD-10-CM

## 2020-01-10 DIAGNOSIS — E11.69 DIABETES MELLITUS TYPE 2 IN OBESE (HCC): ICD-10-CM

## 2020-01-10 DIAGNOSIS — G60.0 CHARCOT-MARIE-TOOTH DISEASE: ICD-10-CM

## 2020-01-10 DIAGNOSIS — E66.9 DIABETES MELLITUS TYPE 2 IN OBESE (HCC): ICD-10-CM

## 2020-01-10 DIAGNOSIS — I10 BENIGN ESSENTIAL HTN: ICD-10-CM

## 2020-01-10 PROCEDURE — 1111F DSCHRG MED/CURRENT MED MERGE: CPT | Performed by: INTERNAL MEDICINE

## 2020-01-10 PROCEDURE — 99214 OFFICE O/P EST MOD 30 MIN: CPT | Performed by: INTERNAL MEDICINE

## 2020-01-10 RX ORDER — LOSARTAN POTASSIUM 50 MG/1
50 TABLET ORAL DAILY
Qty: 90 TABLET | Refills: 0 | COMMUNITY
Start: 2020-01-10 | End: 2020-03-16

## 2020-01-10 RX ORDER — TRAMADOL HYDROCHLORIDE 50 MG/1
TABLET ORAL
Qty: 120 TABLET | Refills: 0 | COMMUNITY
Start: 2020-01-10 | End: 2020-02-04

## 2020-01-10 RX ORDER — TRAZODONE HYDROCHLORIDE 100 MG/1
100 TABLET ORAL NIGHTLY
Qty: 90 TABLET | Refills: 0 | COMMUNITY
Start: 2020-01-10

## 2020-01-27 RX ORDER — SITAGLIPTIN 50 MG/1
TABLET, FILM COATED ORAL
Qty: 90 TABLET | Refills: 0 | Status: SHIPPED | OUTPATIENT
Start: 2020-01-27 | End: 2020-04-29

## 2020-01-27 RX ORDER — GLIPIZIDE 10 MG/1
TABLET, FILM COATED, EXTENDED RELEASE ORAL
Qty: 90 TABLET | Refills: 0 | Status: SHIPPED | OUTPATIENT
Start: 2020-01-27 | End: 2020-06-01

## 2020-02-04 RX ORDER — LORATADINE 10 MG/1
TABLET ORAL
Qty: 30 TABLET | Refills: 3 | Status: SHIPPED | OUTPATIENT
Start: 2020-02-04 | End: 2020-06-01

## 2020-02-04 RX ORDER — TRAMADOL HYDROCHLORIDE 50 MG/1
TABLET ORAL
Qty: 120 TABLET | Refills: 2 | Status: SHIPPED | OUTPATIENT
Start: 2020-02-04 | End: 2020-05-26

## 2020-02-21 RX ORDER — CLOTRIMAZOLE 1 %
CREAM (GRAM) TOPICAL
Qty: 60 G | Refills: 0 | Status: SHIPPED | OUTPATIENT
Start: 2020-02-21 | End: 2020-04-07

## 2020-02-21 NOTE — TELEPHONE ENCOUNTER
Last Ov: 1/10/20, TB, hosp F/U  Upcoming appt: 3/13/20, TB  Last labs: CMP, CBC 12/27/19  Last Rx: clotrimazole 1%, 60g, 1R 1/6/20    Per Protocol, not on protocol. Rx pending.

## 2020-02-24 ENCOUNTER — TELEPHONE (OUTPATIENT)
Dept: INTERNAL MEDICINE CLINIC | Facility: CLINIC | Age: 75
End: 2020-02-24

## 2020-02-24 RX ORDER — PREGABALIN 100 MG/1
CAPSULE ORAL
Qty: 90 CAPSULE | Refills: 3 | Status: SHIPPED | OUTPATIENT
Start: 2020-02-24 | End: 2020-05-26

## 2020-02-24 NOTE — TELEPHONE ENCOUNTER
Fax received from [employer]  for repair of power mobility device requiring signature.  Placed in TB's bin to be signed and faxed back

## 2020-02-24 NOTE — TELEPHONE ENCOUNTER
LOV:1/10/20 TB  FOV:3/13/20  LAST RX:12/24/19 100 mg take 1 cap three times a day 90 caps 0 refills   LAST LABS:1/2/20 poct glucose   PER PROTOCOL: to provider

## 2020-02-28 RX ORDER — PRAVASTATIN SODIUM 20 MG
TABLET ORAL
Qty: 90 TABLET | Refills: 0 | Status: SHIPPED | OUTPATIENT
Start: 2020-02-28 | End: 2020-05-19

## 2020-02-28 NOTE — TELEPHONE ENCOUNTER
LOV:1/10/20 TB  FOV:3/13/20  LAST RX:not filled in office   LAST LABS:1/2/20 poct glucose   PER PROTOCOL: to provider

## 2020-03-12 ENCOUNTER — TELEPHONE (OUTPATIENT)
Dept: INTERNAL MEDICINE CLINIC | Facility: CLINIC | Age: 75
End: 2020-03-12

## 2020-03-12 DIAGNOSIS — I10 BENIGN ESSENTIAL HTN: ICD-10-CM

## 2020-03-12 DIAGNOSIS — Z00.00 ENCOUNTER FOR ANNUAL HEALTH EXAMINATION: ICD-10-CM

## 2020-03-12 DIAGNOSIS — I10 ESSENTIAL HYPERTENSION: Primary | ICD-10-CM

## 2020-03-12 DIAGNOSIS — E11.42 CONTROLLED TYPE 2 DIABETES MELLITUS WITH DIABETIC POLYNEUROPATHY, WITHOUT LONG-TERM CURRENT USE OF INSULIN (HCC): ICD-10-CM

## 2020-03-12 DIAGNOSIS — E11.8 TYPE 2 DIABETES MELLITUS WITH COMPLICATION, WITHOUT LONG-TERM CURRENT USE OF INSULIN (HCC): ICD-10-CM

## 2020-03-12 NOTE — TELEPHONE ENCOUNTER
Wellness   Future Appointments   Date Time Provider Prashant Hanleyi   4/17/2020  3:20 PM Irene Alpers, MD [facility]     Orders to   Andre Mckeono   aware must fast no call back required

## 2020-03-16 RX ORDER — LOSARTAN POTASSIUM 50 MG/1
TABLET ORAL
Qty: 30 TABLET | Refills: 0 | Status: SHIPPED | OUTPATIENT
Start: 2020-03-16 | End: 2020-05-19

## 2020-03-16 NOTE — TELEPHONE ENCOUNTER
Last VISIT 1.10.20 w/ TB    Last REFILL 1.10.20 Losartan 50mg 90T 0R    Last LABS 12.17.19 VitD, HgA1c, CMP    Future Appointments   Date Time Provider [facility] [place]   4/17/2020  3:20 PM Nallely Amaro MD [facility]                Per Day [place]

## 2020-03-25 ENCOUNTER — TELEPHONE (OUTPATIENT)
Dept: INTERNAL MEDICINE CLINIC | Facility: CLINIC | Age: 75
End: 2020-03-25

## 2020-03-25 RX ORDER — OSELTAMIVIR PHOSPHATE 75 MG/1
75 CAPSULE ORAL 2 TIMES DAILY
Qty: 10 CAPSULE | Refills: 0 | Status: SHIPPED | OUTPATIENT
Start: 2020-03-25 | End: 2020-03-25

## 2020-03-25 RX ORDER — OSELTAMIVIR PHOSPHATE 75 MG/1
75 CAPSULE ORAL DAILY
Qty: 10 CAPSULE | Refills: 0 | COMMUNITY
Start: 2020-03-25

## 2020-03-25 NOTE — TELEPHONE ENCOUNTER
Received fax from [facility] regarding influenza A exposure at their facility  It is recommended by the [facility] Department she recuve prophylactic Tamiflu. Will order to pharmacy requested.       Billie Estevez to inform them of the order

## 2020-04-02 ENCOUNTER — TELEPHONE (OUTPATIENT)
Dept: INTERNAL MEDICINE CLINIC | Facility: CLINIC | Age: 75
End: 2020-04-02

## 2020-04-02 NOTE — TELEPHONE ENCOUNTER
Pharmacy stated [facility] called and wanted the below medication filled for pt to apply on a wound. Please advise.     mupirocin 2 % External Ointment

## 2020-04-02 NOTE — TELEPHONE ENCOUNTER
Last Ov: 1/10/20, TB, hosp f/u  Upcoming appt: no upcoming appt  Last labs: CMP, CBC AST, Potassium 12/27/19  Last Rx: mupirocin 2% ointment entered historically     Per Protocol, not on protocol. Rx pending.

## 2020-04-07 RX ORDER — CLOTRIMAZOLE 1 %
CREAM (GRAM) TOPICAL
Qty: 60 G | Refills: 0 | Status: SHIPPED | OUTPATIENT
Start: 2020-04-07 | End: 2020-05-04

## 2020-04-07 NOTE — TELEPHONE ENCOUNTER
Last Ov: 1/10/20, TB, Hosp F/U  Upcoming appt: 4/17/20, TB  Last labs: CMP, CBC, AST, Potassium 12/27/19  Last Rx: clotrimazole 1% cream, 60g, 0R 2/21/20    Per Protocol, not on protocol. Rx pending.

## 2020-04-29 ENCOUNTER — TELEPHONE (OUTPATIENT)
Dept: INTERNAL MEDICINE CLINIC | Facility: CLINIC | Age: 75
End: 2020-04-29

## 2020-04-29 RX ORDER — SITAGLIPTIN 50 MG/1
TABLET, FILM COATED ORAL
Qty: 90 TABLET | Refills: 0 | Status: SHIPPED | OUTPATIENT
Start: 2020-04-29 | End: 2020-06-30

## 2020-04-29 NOTE — TELEPHONE ENCOUNTER
CB signed orders for COVID 19 testing per TB ok. Faxed orders to [facility] as requested. Confirmation received.

## 2020-04-29 NOTE — TELEPHONE ENCOUNTER
Received a fax from [facility] for orders for patient to be tested for Covid    Please advise

## 2020-04-29 NOTE — TELEPHONE ENCOUNTER
Received order request from [employer] for incontinence supplies. Placed in MD bin for review/signature.

## 2020-05-01 ENCOUNTER — TELEPHONE (OUTPATIENT)
Dept: INTERNAL MEDICINE CLINIC | Facility: CLINIC | Age: 75
End: 2020-05-01

## 2020-05-01 NOTE — TELEPHONE ENCOUNTER
Test results received from [facility]. Patient tested negative for covid-19. In TB bin for review.  Hold for scan

## 2020-05-04 RX ORDER — CLOTRIMAZOLE 1 %
CREAM (GRAM) TOPICAL
Qty: 60 G | Refills: 0 | Status: SHIPPED | OUTPATIENT
Start: 2020-05-04 | End: 2020-05-27

## 2020-05-04 NOTE — TELEPHONE ENCOUNTER
Last OV 1.10.20 w/ TB (hospital f/up)  Last PE 3.22.19-Overdue  Last REFILL 4.7.20 Clotrimazole 1% 60g 0R  Last LABS 12.17.19 VitD, HgA1c, CMP    Future Appointments   Date Time Provider Prashant Hanleyi   5/20/2020  2:40 PM ANJALI Ingram [facility]

## 2020-05-05 ENCOUNTER — TELEPHONE (OUTPATIENT)
Dept: INTERNAL MEDICINE CLINIC | Facility: CLINIC | Age: 75
End: 2020-05-05

## 2020-05-15 ENCOUNTER — TELEPHONE (OUTPATIENT)
Dept: INTERNAL MEDICINE CLINIC | Facility: CLINIC | Age: 75
End: 2020-05-15

## 2020-05-15 NOTE — TELEPHONE ENCOUNTER
Discussed case.  On keflex now for 14 days, she will follow her closely and let me know if any problems arise

## 2020-05-15 NOTE — TELEPHONE ENCOUNTER
Dr. Amanda Tai to let TB know she saw this patient in her home today and she has ulcerated toe which caused her to have cellulitis in her leg-she wanted to call and let you know she started the patient on antibiotics for this-FYI-you can call her if you like f

## 2020-05-19 RX ORDER — LOSARTAN POTASSIUM 50 MG/1
TABLET ORAL
Qty: 90 TABLET | Refills: 1 | Status: SHIPPED | OUTPATIENT
Start: 2020-05-19 | End: 2020-10-12

## 2020-05-19 RX ORDER — PRAVASTATIN SODIUM 20 MG
TABLET ORAL
Qty: 30 TABLET | Refills: 2 | Status: SHIPPED | OUTPATIENT
Start: 2020-05-19 | End: 2020-11-03

## 2020-05-21 NOTE — TELEPHONE ENCOUNTER
Future Appointments   Date Time Provider Prashant [place]   7/13/2020  2:20 PM Surya Calhoun MD [facility]   7/27/2020  4:20 PM Harris Webster APN [facility]  SPAL     Lab orders have been placed already

## 2020-05-26 RX ORDER — TRAMADOL HYDROCHLORIDE 50 MG/1
TABLET ORAL
Qty: 120 TABLET | Refills: 0 | Status: SHIPPED | OUTPATIENT
Start: 2020-05-26 | End: 2020-09-08

## 2020-05-26 RX ORDER — PREGABALIN 100 MG/1
CAPSULE ORAL
Qty: 90 CAPSULE | Refills: 0 | Status: SHIPPED | OUTPATIENT
Start: 2020-05-26 | End: 2020-07-30

## 2020-05-26 NOTE — TELEPHONE ENCOUNTER
Last OV 1.10.20 w/ TB (hospital f/up)  Last PE 3.22.19-Overdue   Last REFILL 2.4.20 Tramadol 50mg #120 2R    2.4.20 Pregablin 100mg #90 3R  Last LABS 12.17.19 VitD, HgA1c, CMP    Future Appointments   Date Time Provider Prashant Solis   7/13/2020  2:20

## 2020-05-27 RX ORDER — CLOTRIMAZOLE 1 %
CREAM (GRAM) TOPICAL
Qty: 60 G | Refills: 10 | Status: SHIPPED | OUTPATIENT
Start: 2020-05-27 | End: 2020-06-30

## 2020-05-27 NOTE — TELEPHONE ENCOUNTER
Last Ov: 1/10/20, TB, hosp f/u  Last labs: CMP, CBC, AST, Potassium 12/27/19  Last Rx: clotrimazole 1% cream, 60g, 0R 5/4/20    Future Appointments   Date Time Provider Prashant Solis   7/13/2020  2:20 PM Jonatan Perkins MD [facility]   7/27/2

## 2020-06-01 RX ORDER — LORATADINE 10 MG/1
TABLET ORAL
Qty: 30 TABLET | Refills: 0 | Status: SHIPPED | OUTPATIENT
Start: 2020-06-01 | End: 2020-08-06

## 2020-06-01 RX ORDER — GLIPIZIDE 10 MG/1
TABLET, FILM COATED, EXTENDED RELEASE ORAL
Qty: 30 TABLET | Refills: 0 | Status: SHIPPED | OUTPATIENT
Start: 2020-06-01 | End: 2020-06-30

## 2020-06-12 ENCOUNTER — TELEPHONE (OUTPATIENT)
Dept: INTERNAL MEDICINE CLINIC | Facility: CLINIC | Age: 75
End: 2020-06-12

## 2020-06-12 NOTE — TELEPHONE ENCOUNTER
Fax received from [facility]. Needs to be reviewed by TB and completed. Placed in box. Form sent to T:[phone] when completed.

## 2020-06-30 RX ORDER — CLOTRIMAZOLE 1 %
CREAM (GRAM) TOPICAL
Qty: 60 G | Refills: 10 | Status: SHIPPED | OUTPATIENT
Start: 2020-06-30 | End: 2021-06-14

## 2020-06-30 NOTE — TELEPHONE ENCOUNTER
Last OV: 1/10/20 hospital f/u    Future Appointments   Date Time Provider Prashant Solis   7/13/2020  2:20 PM Renate Watson MD [facility]   7/27/2020  4:20 PM ANJALI Vee [facility]  SPAL        Latest labs: 12/17/19 CMP, A1

## 2020-06-30 NOTE — TELEPHONE ENCOUNTER
Last OV: 1/10/20 hospital f/u    Future Appointments   Date Time Provider Prashant [place]   7/13/2020  2:20 PM Manjula Hartman MD [facility]   7/27/2020  4:20 PM ANJALI Melissa [facility]  SPAL        Latest labs: 12/17/19 CMP, A1

## 2020-07-01 RX ORDER — GLIPIZIDE 10 MG/1
TABLET, FILM COATED, EXTENDED RELEASE ORAL
Qty: 90 TABLET | Refills: 0 | Status: SHIPPED | OUTPATIENT
Start: 2020-07-01 | End: 2020-08-25

## 2020-07-01 RX ORDER — SITAGLIPTIN 50 MG/1
TABLET, FILM COATED ORAL
Qty: 90 TABLET | Refills: 0 | Status: SHIPPED | OUTPATIENT
Start: 2020-07-01 | End: 2020-09-22

## 2020-07-09 ENCOUNTER — NURSE ONLY (OUTPATIENT)
Dept: LAB | Age: 75
End: 2020-07-09
Attending: INTERNAL MEDICINE
Payer: MEDICARE

## 2020-07-09 DIAGNOSIS — I10 ESSENTIAL HYPERTENSION: ICD-10-CM

## 2020-07-09 DIAGNOSIS — E11.42 CONTROLLED TYPE 2 DIABETES MELLITUS WITH DIABETIC POLYNEUROPATHY, WITHOUT LONG-TERM CURRENT USE OF INSULIN (HCC): ICD-10-CM

## 2020-07-09 DIAGNOSIS — Z00.00 ENCOUNTER FOR ANNUAL HEALTH EXAMINATION: ICD-10-CM

## 2020-07-09 LAB
ALBUMIN SERPL-MCNC: 3 G/DL (ref 3.4–5)
ALBUMIN/GLOB SERPL: 0.8 {RATIO} (ref 1–2)
ALP LIVER SERPL-CCNC: 82 U/L (ref 55–142)
ALT SERPL-CCNC: 14 U/L (ref 13–56)
ANION GAP SERPL CALC-SCNC: 4 MMOL/L (ref 0–18)
AST SERPL-CCNC: 10 U/L (ref 15–37)
BASOPHILS # BLD AUTO: 0.06 X10(3) UL (ref 0–0.2)
BASOPHILS NFR BLD AUTO: 0.8 %
BILIRUB SERPL-MCNC: 0.4 MG/DL (ref 0.1–2)
BUN BLD-MCNC: 16 MG/DL (ref 7–18)
BUN/CREAT SERPL: 22.9 (ref 10–20)
CALCIUM BLD-MCNC: 9 MG/DL (ref 8.5–10.1)
CHLORIDE SERPL-SCNC: 105 MMOL/L (ref 98–112)
CHOLEST SMN-MCNC: 100 MG/DL (ref ?–200)
CO2 SERPL-SCNC: 32 MMOL/L (ref 21–32)
CREAT BLD-MCNC: 0.7 MG/DL (ref 0.55–1.02)
DEPRECATED RDW RBC AUTO: 50.2 FL (ref 35.1–46.3)
EOSINOPHIL # BLD AUTO: 0.37 X10(3) UL (ref 0–0.7)
EOSINOPHIL NFR BLD AUTO: 5 %
ERYTHROCYTE [DISTWIDTH] IN BLOOD BY AUTOMATED COUNT: 14.5 % (ref 11–15)
GLOBULIN PLAS-MCNC: 3.6 G/DL (ref 2.8–4.4)
GLUCOSE BLD-MCNC: 67 MG/DL (ref 70–99)
HCT VFR BLD AUTO: 47.9 % (ref 35–48)
HDLC SERPL-MCNC: 44 MG/DL (ref 40–59)
HGB BLD-MCNC: 14.9 G/DL (ref 12–16)
IMM GRANULOCYTES # BLD AUTO: 0.03 X10(3) UL (ref 0–1)
IMM GRANULOCYTES NFR BLD: 0.4 %
LDLC SERPL CALC-MCNC: 32 MG/DL (ref ?–100)
LYMPHOCYTES # BLD AUTO: 1.68 X10(3) UL (ref 1–4)
LYMPHOCYTES NFR BLD AUTO: 22.6 %
M PROTEIN MFR SERPL ELPH: 6.6 G/DL (ref 6.4–8.2)
MCH RBC QN AUTO: 29.6 PG (ref 26–34)
MCHC RBC AUTO-ENTMCNC: 31.1 G/DL (ref 31–37)
MCV RBC AUTO: 95 FL (ref 80–100)
MONOCYTES # BLD AUTO: 0.6 X10(3) UL (ref 0.1–1)
MONOCYTES NFR BLD AUTO: 8.1 %
NEUTROPHILS # BLD AUTO: 4.71 X10 (3) UL (ref 1.5–7.7)
NEUTROPHILS # BLD AUTO: 4.71 X10(3) UL (ref 1.5–7.7)
NEUTROPHILS NFR BLD AUTO: 63.1 %
NONHDLC SERPL-MCNC: 56 MG/DL (ref ?–130)
OSMOLALITY SERPL CALC.SUM OF ELEC: 291 MOSM/KG (ref 275–295)
PLATELET # BLD AUTO: 133 10(3)UL (ref 150–450)
POTASSIUM SERPL-SCNC: 3.5 MMOL/L (ref 3.5–5.1)
RBC # BLD AUTO: 5.04 X10(6)UL (ref 3.8–5.3)
SODIUM SERPL-SCNC: 141 MMOL/L (ref 136–145)
TRIGL SERPL-MCNC: 122 MG/DL (ref 30–149)
TSI SER-ACNC: 0.82 MIU/ML (ref 0.36–3.74)
VLDLC SERPL CALC-MCNC: 24 MG/DL (ref 0–30)
WBC # BLD AUTO: 7.5 X10(3) UL (ref 4–11)

## 2020-07-09 PROCEDURE — 84443 ASSAY THYROID STIM HORMONE: CPT

## 2020-07-09 PROCEDURE — 80061 LIPID PANEL: CPT

## 2020-07-09 PROCEDURE — 36415 COLL VENOUS BLD VENIPUNCTURE: CPT

## 2020-07-09 PROCEDURE — 80053 COMPREHEN METABOLIC PANEL: CPT

## 2020-07-09 PROCEDURE — 85025 COMPLETE CBC W/AUTO DIFF WBC: CPT

## 2020-07-10 ENCOUNTER — APPOINTMENT (OUTPATIENT)
Dept: LAB | Age: 75
End: 2020-07-10
Attending: INTERNAL MEDICINE
Payer: MEDICARE

## 2020-07-10 ENCOUNTER — TELEPHONE (OUTPATIENT)
Dept: INTERNAL MEDICINE CLINIC | Facility: CLINIC | Age: 75
End: 2020-07-10

## 2020-07-10 LAB
CREAT UR-SCNC: 68.1 MG/DL
MICROALBUMIN UR-MCNC: 2.2 MG/DL
MICROALBUMIN/CREAT 24H UR-RTO: 32.3 UG/MG (ref ?–30)

## 2020-07-10 PROCEDURE — 82043 UR ALBUMIN QUANTITATIVE: CPT

## 2020-07-10 PROCEDURE — 82570 ASSAY OF URINE CREATININE: CPT

## 2020-07-10 NOTE — TELEPHONE ENCOUNTER
Received fax from [address] [place] [address] with attached lab results. No abstracting needed due to labs already in epic. Placed in TB bin to review.

## 2020-07-13 ENCOUNTER — VIRTUAL PHONE E/M (OUTPATIENT)
Dept: INTERNAL MEDICINE CLINIC | Facility: CLINIC | Age: 75
End: 2020-07-13
Payer: MEDICARE

## 2020-07-13 VITALS
SYSTOLIC BLOOD PRESSURE: 156 MMHG | BODY MASS INDEX: 42 KG/M2 | DIASTOLIC BLOOD PRESSURE: 72 MMHG | WEIGHT: 285 LBS | HEART RATE: 84 BPM

## 2020-07-13 DIAGNOSIS — G60.0 CHARCOT-MARIE-TOOTH DISEASE: ICD-10-CM

## 2020-07-13 DIAGNOSIS — E66.9 DIABETES MELLITUS TYPE 2 IN OBESE (HCC): ICD-10-CM

## 2020-07-13 DIAGNOSIS — F41.1 ANXIETY STATE: ICD-10-CM

## 2020-07-13 DIAGNOSIS — G47.33 OSA (OBSTRUCTIVE SLEEP APNEA): ICD-10-CM

## 2020-07-13 DIAGNOSIS — E11.69 DIABETES MELLITUS TYPE 2 IN OBESE (HCC): ICD-10-CM

## 2020-07-13 DIAGNOSIS — Z00.00 ENCOUNTER FOR ANNUAL HEALTH EXAMINATION: Primary | ICD-10-CM

## 2020-07-13 DIAGNOSIS — I10 ESSENTIAL HYPERTENSION: ICD-10-CM

## 2020-07-13 PROCEDURE — G0439 PPPS, SUBSEQ VISIT: HCPCS | Performed by: INTERNAL MEDICINE

## 2020-07-13 RX ORDER — OMEPRAZOLE 20 MG/1
CAPSULE, DELAYED RELEASE ORAL
Qty: 90 CAPSULE | Refills: 3 | Status: SHIPPED | OUTPATIENT
Start: 2020-07-13 | End: 2021-07-14

## 2020-07-13 RX ORDER — CEPHALEXIN 500 MG/1
CAPSULE ORAL
COMMUNITY
Start: 2020-05-29

## 2020-07-13 RX ORDER — CYCLOBENZAPRINE HCL 10 MG
10 TABLET ORAL 2 TIMES DAILY PRN
Qty: 60 TABLET | Refills: 2 | Status: SHIPPED | OUTPATIENT
Start: 2020-07-13 | End: 2020-11-13

## 2020-07-13 NOTE — TELEPHONE ENCOUNTER
LOV:1/10/20 TB  FOV:none on file   LAST RX:9/3/19 20 mg take 1 cap daily 30 caps 10 refills   LAST LABS:7/10/20 microalb  PER PROTOCOL: to provider

## 2020-07-13 NOTE — PATIENT INSTRUCTIONS
Katrina Funez's SCREENING SCHEDULE   Tests on this list are recommended by your physician but may not be covered, or covered at this frequency, by your insurer. Please check with your insurance carrier before scheduling to verify coverage.    PREVENTATI more than 100 cigarettes in their lifetime   • Anyone with a family history    Colorectal Cancer Screening  Covered up to Age 76     Colonoscopy Screen   Covered every 10 years- more often if abnormal There are no preventive care reminders to display for t Immunizations      Influenza  Covered Annually No orders found for this or any previous visit.  Please get every year    Pneumococcal 13 (Prevnar)  Covered Once after 65 Orders placed or performed in visit on 10/24/16   • PNEUMOCOCCAL VACC, 13 ANTONI IM    P Directives.

## 2020-07-13 NOTE — PROGRESS NOTES
HPI:   Jodee Gutierrez is a 66year old female who presents for a Medicare Subsequent Annual Wellness visit (Pt already had Initial Annual Wellness).     Due to COVID-19 ACTION PLAN, the patient's office visit was converted to a phone visit with informed Correct  Recall \"Flag\": Correct  Recall \"Tree\": Correct       Functional Ability/Status   Sola Sanchez has some abnormal functions as listed below:  She has Dressing and/or Bathing issues based on screening of functional status.    Difficulty dressin Anxiety state     Diabetes mellitus (HCC)-controlled     Hypertension     Osteoarthritis     ABDIAZIZ (obstructive sleep apnea)     ABDIAZIZ on CPAP     Gout     Morbid obesity with BMI of 40.0-44.9, adult ([facility] [address]     Diabetes mellitus type 2 in obese ([facility]     Hypert and sulfa drugs cross reactors.     CURRENT MEDICATIONS:   OMEPRAZOLE 20 MG Oral Capsule Delayed Release, TAKE ONE CAPSULE BY MOUTH EVERY MORNING  cyclobenzaprine 10 MG Oral Tab, Take 1 tablet (10 mg total) by mouth 2 (two) times daily as needed for Muscle mouth 3 (three) times daily. Prazosin HCl 5 MG Oral Cap, Take 5 mg by mouth nightly.     cyanocobalamin (VITAMIN B12) 1000 MCG/ML injection 1,000 mcg       MEDICAL INFORMATION:   She  has a past medical history of Acute osteomyelitis of right hand ([address] rub could not be done via phone visit but she is able to hear me fine and responds appropriately over the phone        Visual Acuity cannot assess but she feels no changes in vision                           Alert and oriented  Speaking in full sentences General Health     In the past six months, have you lost more than 10 pounds without trying?: 2 - No  Has your appetite been poor?: No  How does the patient maintain a good energy level?: Other  How would you describe your daily physical activity?: Lig Maintenance if applicable    Chlamydia  Annually if high risk No results found for: CHLAMYDIA No flowsheet data found.     Screening Mammogram      Mammogram Annually to 76, then as discussed There are no preventive care reminders to display for this patien Diabetes      HgbA1C  Annually HGBA1C (%)   Date Value   06/24/2014 7.1 (H)     HgbA1C (%)   Date Value   12/17/2019 6.1 (H)       No flowsheet data found.     Creat/alb ratio  Annually Malb/Cre Calc (ug/mg)   Date Value   07/10/2020 32.3 (H)        LDL

## 2020-07-14 ENCOUNTER — TELEPHONE (OUTPATIENT)
Dept: INTERNAL MEDICINE CLINIC | Facility: CLINIC | Age: 75
End: 2020-07-14

## 2020-07-14 NOTE — TELEPHONE ENCOUNTER
DOM: Pt saw the pt at [facility] last Friday and she has the same cellulitis to her left lower leg, again probably from her third toe chronic ulcer. She placed her on Keflex 500mg TID for 2 weeks since that seemed to work last time. She will follow up with her this Friday and they will plan x-rays for the toe. No call back required.

## 2020-07-20 RX ORDER — ACETAMINOPHEN 500 MG
TABLET ORAL
Qty: 180 TABLET | Refills: 1 | Status: SHIPPED | OUTPATIENT
Start: 2020-07-20 | End: 2020-09-14

## 2020-07-20 NOTE — TELEPHONE ENCOUNTER
Last OV 1.10.20 w/ TB (hospital f/up)   Last PE 7.13.20 (televisit)   Last REFILL No recent refill on file   Last LABS 7.10.20 Microalb    7. 9.20 Microalb, TSH w/ reflex, Lipid, CMP, CBC    Future Appointments   Date Time Provider Prashant Solis   7/27/

## 2020-07-25 ENCOUNTER — MED REC SCAN ONLY (OUTPATIENT)
Dept: INTERNAL MEDICINE CLINIC | Facility: CLINIC | Age: 75
End: 2020-07-25

## 2020-07-30 RX ORDER — PREGABALIN 100 MG/1
100 CAPSULE ORAL 3 TIMES DAILY
Qty: 90 CAPSULE | Refills: 1 | Status: SHIPPED | OUTPATIENT
Start: 2020-07-30 | End: 2020-08-25

## 2020-07-30 NOTE — TELEPHONE ENCOUNTER
Last Ov: 7/13/20, TB, virtual visit (CPE)  Last labs: CBC, CMP, Lipid, TSH w Ref 7/9/20  Last Rx: Pregabalin 100mg, #90, 0R 5/26/20    No future appointments. Per Protocol - not on protocol. Rx pending.

## 2020-07-30 NOTE — TELEPHONE ENCOUNTER
Pts facility states that she is out of this medication and needs refill ASAP.  The delivery ragini will  the refill at 11    PREGABALIN 100 MG Oral Cap 90 capsule 0 5/26/2020    Sig:   TAKE ONE CAPSULE BY MOUTH 3 TIMES A DAY

## 2020-08-06 RX ORDER — LORATADINE 10 MG/1
TABLET ORAL
Qty: 90 TABLET | Refills: 1 | Status: SHIPPED | OUTPATIENT
Start: 2020-08-06 | End: 2020-12-28

## 2020-08-10 RX ORDER — ASPIRIN 81 MG
TABLET, DELAYED RELEASE (ENTERIC COATED) ORAL
Qty: 30 TABLET | Refills: 0 | Status: SHIPPED | OUTPATIENT
Start: 2020-08-10 | End: 2020-09-28

## 2020-08-10 NOTE — TELEPHONE ENCOUNTER
Last Ov: 7/13/20, TB, virtual visit (CPE)  Last labs: CBC, CMP, Lipid, TSH w Ref 7/9/20  Last Rx: aspirin 81mg, #30, 10R 10/1/19    No future appointments. Per Protocol - not on protocol, Rx pending.

## 2020-08-17 RX ORDER — B-COMPLEX WITH VITAMIN C
TABLET ORAL
Qty: 60 TABLET | Refills: 5 | Status: SHIPPED | OUTPATIENT
Start: 2020-08-17 | End: 2021-02-17

## 2020-08-17 NOTE — TELEPHONE ENCOUNTER
Last OV: 1/10/20 hospital f/u    No future appointments. Latest labs: 7/9/20 Microalb, TSH w/ref, Lipid, CMP and BC    Latest RX: OYSTER SHELL CA 500MG W/D TAB no refills on file for this medication    Per protocol, not on protocol. Rx pending.

## 2020-08-19 ENCOUNTER — TELEPHONE (OUTPATIENT)
Dept: INTERNAL MEDICINE CLINIC | Facility: CLINIC | Age: 75
End: 2020-08-19

## 2020-08-19 NOTE — TELEPHONE ENCOUNTER
Received fax from [facility] w/ request for new RX for artifical tears, placed in TB bin for review

## 2020-08-25 NOTE — TELEPHONE ENCOUNTER
Last VISIT  01/10/20    Last REFILL 07/01/20 Glipizide qty 90 w/0 refills, 07/30/20 Pregabalin qty 90 w/1 refill    Last LABS 07/10/20 Microalb/creat done    No future appointments. Per PROTOCOL? Failed/ not on protocol    Please Approve or Deny.

## 2020-08-26 RX ORDER — GLIPIZIDE 10 MG/1
TABLET, FILM COATED, EXTENDED RELEASE ORAL
Qty: 90 TABLET | Refills: 1 | Status: SHIPPED | OUTPATIENT
Start: 2020-08-26 | End: 2021-02-17

## 2020-08-26 RX ORDER — PREGABALIN 100 MG/1
CAPSULE ORAL
Qty: 90 CAPSULE | Refills: 3 | Status: SHIPPED | OUTPATIENT
Start: 2020-08-26 | End: 2020-12-28

## 2020-08-31 RX ORDER — ALLOPURINOL 300 MG/1
TABLET ORAL
Qty: 90 TABLET | Refills: 3 | Status: SHIPPED | OUTPATIENT
Start: 2020-08-31

## 2020-08-31 NOTE — TELEPHONE ENCOUNTER
Last Ov: 7/13/20, TB, CPE  Last labs: CBC, CMP, Lipid, TSH w Ref, microalb/creat 7/10/20  Last Rx: allpurinol 300mg, #30, 10R, 10/1/19    No future appointments. Per Protocol - not on protocol, pending.

## 2020-09-08 ENCOUNTER — TELEPHONE (OUTPATIENT)
Dept: INTERNAL MEDICINE CLINIC | Facility: CLINIC | Age: 75
End: 2020-09-08

## 2020-09-08 NOTE — TELEPHONE ENCOUNTER
Last Ov:7/13/20 phone  Upcoming appt:none  Last labs:7/9/20 tsh, lipid, cmp, cbc  Last Rx:5/26/20 tramadol HCL 50mg, patient reported hydrocortisone 2.5% external cream    Per Protocol sent for review

## 2020-09-09 RX ORDER — TRAMADOL HYDROCHLORIDE 50 MG/1
TABLET ORAL
Qty: 120 TABLET | Refills: 0 | Status: SHIPPED | OUTPATIENT
Start: 2020-09-09 | End: 2020-11-09

## 2020-09-09 RX ORDER — HYDROCORTISONE 25 MG/G
CREAM TOPICAL
Qty: 30 G | Refills: 11 | Status: SHIPPED | OUTPATIENT
Start: 2020-09-09

## 2020-09-14 ENCOUNTER — TELEPHONE (OUTPATIENT)
Dept: INTERNAL MEDICINE CLINIC | Facility: CLINIC | Age: 75
End: 2020-09-14

## 2020-09-14 RX ORDER — ACETAMINOPHEN 500 MG
TABLET ORAL
Qty: 180 TABLET | Refills: 0 | Status: SHIPPED | OUTPATIENT
Start: 2020-09-14 | End: 2020-11-16

## 2020-09-14 NOTE — TELEPHONE ENCOUNTER
Received dispense request from patients [address] [place] [address] for ok to dispense medication for patient. Placed on provider TB desk for review/signature.

## 2020-09-14 NOTE — TELEPHONE ENCOUNTER
Last OV 7.13.20 w/ TB (virtual/annual pe)   Last PE 7.13.20   Last REFILL 7.20.20 MAPAP 500mg #180 1R  Last LABS 7.10.20 Microalb    7. 9.20 TSH w/reflex, Lipid, CMP, CBC     No future appointments. Per PROTOCOL?  Not on protocol     Please Advise

## 2020-09-14 NOTE — TELEPHONE ENCOUNTER
Received orders from [facility] in regards to DM wheels. Placed on provider desk for signature.

## 2020-09-17 ENCOUNTER — TELEPHONE (OUTPATIENT)
Dept: INTERNAL MEDICINE CLINIC | Facility: CLINIC | Age: 75
End: 2020-09-17

## 2020-09-17 NOTE — TELEPHONE ENCOUNTER
Orders received from [place] [id number] in regards to footwear orders. Placed in TB bin for review.

## 2020-09-18 NOTE — TELEPHONE ENCOUNTER
Please reach out to have patient schedule televisit for end of October/early November for DM, htn, f/u

## 2020-09-22 RX ORDER — SITAGLIPTIN 50 MG/1
TABLET, FILM COATED ORAL
Qty: 90 TABLET | Refills: 0 | Status: SHIPPED | OUTPATIENT
Start: 2020-09-22 | End: 2021-01-30

## 2020-09-22 NOTE — TELEPHONE ENCOUNTER
Last Ov: 7/13/20, TB, virtual visit (CPE)  Last labs: 7/9/20 in media. Last Rx: Januvia 50mg, #90, 0R 7/1/20    No future appointments. Per Protocol - failed. Pt due for appt, A1c, and last A1c out of range. Rx pending.

## 2020-09-28 RX ORDER — ASPIRIN 81 MG/1
TABLET, COATED ORAL
Qty: 90 TABLET | Refills: 1 | Status: SHIPPED | OUTPATIENT
Start: 2020-09-28 | End: 2021-02-22

## 2020-09-28 NOTE — TELEPHONE ENCOUNTER
Last Ov: 7/13/20, TB, virtual (CPE)  Last labs: TSH w Ref, Lipid, CMP, CBC 7/9/20  Last Rx: aspirin 81mg, #30, 0R 8/10/20    No future appointments. Per Protocol - not on protocol, Rx pending.

## 2020-10-06 NOTE — TELEPHONE ENCOUNTER
Pt called back and scheduled Phone visit for   Future Appointments   Date Time Provider Prashant Solis   11/9/2020  2:40 PM Sumeet Ascencio MD [facility]

## 2020-10-12 RX ORDER — LOSARTAN POTASSIUM 50 MG/1
TABLET ORAL
Qty: 90 TABLET | Refills: 1 | Status: SHIPPED | OUTPATIENT
Start: 2020-10-12 | End: 2020-11-09

## 2020-11-03 RX ORDER — PRAVASTATIN SODIUM 20 MG
TABLET ORAL
Qty: 90 TABLET | Refills: 1 | Status: SHIPPED | OUTPATIENT
Start: 2020-11-03 | End: 2021-04-14

## 2020-11-05 ENCOUNTER — NURSE ONLY (OUTPATIENT)
Dept: LAB | Age: 75
End: 2020-11-05
Attending: INTERNAL MEDICINE
Payer: MEDICARE

## 2020-11-05 DIAGNOSIS — E11.69 DIABETES MELLITUS TYPE 2 IN OBESE (HCC): ICD-10-CM

## 2020-11-05 DIAGNOSIS — E66.9 DIABETES MELLITUS TYPE 2 IN OBESE (HCC): ICD-10-CM

## 2020-11-05 PROCEDURE — 83036 HEMOGLOBIN GLYCOSYLATED A1C: CPT

## 2020-11-05 PROCEDURE — 36415 COLL VENOUS BLD VENIPUNCTURE: CPT

## 2020-11-09 ENCOUNTER — VIRTUAL PHONE E/M (OUTPATIENT)
Dept: INTERNAL MEDICINE CLINIC | Facility: CLINIC | Age: 75
End: 2020-11-09
Payer: MEDICARE

## 2020-11-09 DIAGNOSIS — E11.69 DIABETES MELLITUS TYPE 2 IN OBESE (HCC): Primary | ICD-10-CM

## 2020-11-09 DIAGNOSIS — G60.0 CHARCOT-MARIE-TOOTH DISEASE: ICD-10-CM

## 2020-11-09 DIAGNOSIS — E66.9 DIABETES MELLITUS TYPE 2 IN OBESE (HCC): Primary | ICD-10-CM

## 2020-11-09 DIAGNOSIS — I10 BENIGN ESSENTIAL HTN: ICD-10-CM

## 2020-11-09 PROCEDURE — 99442 PHONE E/M BY PHYS 11-20 MIN: CPT | Performed by: INTERNAL MEDICINE

## 2020-11-09 RX ORDER — LOSARTAN POTASSIUM 50 MG/1
100 TABLET ORAL EVERY MORNING
Qty: 90 TABLET | Refills: 1 | COMMUNITY
Start: 2020-11-09 | End: 2021-04-14

## 2020-11-09 RX ORDER — TRAMADOL HYDROCHLORIDE 50 MG/1
TABLET ORAL
Qty: 120 TABLET | Refills: 0 | Status: SHIPPED | OUTPATIENT
Start: 2020-11-09 | End: 2021-02-17

## 2020-11-09 NOTE — PROGRESS NOTES
Due to COVID-19 ACTION PLAN, the patient's office visit was converted to a phone visit with informed patient consent.    Time Spent: 15 min    Subjective     HPI:   Quentin Chaidez is a 66year old female who presents for f/u-  DM2 - hgba1c of 6, not able t real-time interactive audio communication.   This has been done in good austyn to provide continuity of care in the best interest of the provider-patient relationship, due to the on-going public health crisis/national emergency and because of restrictions of

## 2020-11-10 ENCOUNTER — TELEPHONE (OUTPATIENT)
Dept: INTERNAL MEDICINE CLINIC | Facility: CLINIC | Age: 75
End: 2020-11-10

## 2020-11-10 NOTE — TELEPHONE ENCOUNTER
Pt calling back to give TB the fax number where Pt lives, [place], [phone]    Was discussed with TB at virtual visit, 11-

## 2020-11-11 NOTE — TELEPHONE ENCOUNTER
I am writing a letter to ask them to check her BP daily for 1 week. Please fax letter to [facility]. Thank you!

## 2020-11-13 DIAGNOSIS — G60.0 CHARCOT-MARIE-TOOTH DISEASE: ICD-10-CM

## 2020-11-13 RX ORDER — CYCLOBENZAPRINE HCL 10 MG
TABLET ORAL
Qty: 60 TABLET | Refills: 11 | Status: SHIPPED | OUTPATIENT
Start: 2020-11-13

## 2020-11-13 NOTE — TELEPHONE ENCOUNTER
LOV:1/10/20, Hospital F/U, TB  Last CPE:3/29/19, CPE, TB  Last refill:cyclobenzaprine 10 MG Oral Tab, 7/13/20  Quantity:60 tablet, 2 refills  Last labs that are related: cbc 7/9/20  Future appointment:No future appointments.   Protocol:pend for provider

## 2020-11-16 RX ORDER — ACETAMINOPHEN 500 MG
TABLET ORAL
Qty: 180 TABLET | Refills: 0 | Status: SHIPPED | OUTPATIENT
Start: 2020-11-16 | End: 2020-12-18

## 2020-11-16 NOTE — TELEPHONE ENCOUNTER
Last Ov: 7/13/20, TB, CPE (virtual)  Last labs: A1c 11/5/20  Last Rx: aspirin 81mg, #90, 1R 9/28/20    No future appointments. Per Protocol - not on protocol, Rx pending.

## 2020-12-07 RX ORDER — MIRABEGRON 50 MG/1
TABLET, FILM COATED, EXTENDED RELEASE ORAL
Qty: 90 TABLET | Refills: 1 | Status: SHIPPED | OUTPATIENT
Start: 2020-12-07 | End: 2021-06-07

## 2020-12-07 NOTE — TELEPHONE ENCOUNTER
Last Ov: 11/9/20, TB, Virtual visit (DM f/u). Last labs: A1c 11/5/20  Last Rx: Myrbetriq 50mg, #90, 3R 12/9/19    No future appointments. Per Protocol - not on protocol, Rx pending.

## 2020-12-18 RX ORDER — ACETAMINOPHEN 500 MG
TABLET ORAL
Qty: 180 TABLET | Refills: 0 | Status: SHIPPED | OUTPATIENT
Start: 2020-12-18 | End: 2021-01-18

## 2020-12-18 NOTE — TELEPHONE ENCOUNTER
LOV:11/09/10, DM/Virtual visit, TB  Last CPE:7/13/20, CPE/Virtual visit, TB  Last refill:MAPAP 500 MG Oral Tab, 11/16/20   Quantity:180 tablet, 0 refills  Last labs that are related:cbc, cmp 7/9/20  Future appointment:No future appointments.   Protocol: no

## 2020-12-28 RX ORDER — PREGABALIN 100 MG/1
CAPSULE ORAL
Qty: 90 CAPSULE | Refills: 2 | Status: SHIPPED | OUTPATIENT
Start: 2020-12-28 | End: 2021-04-29

## 2020-12-28 RX ORDER — LORATADINE 10 MG/1
TABLET ORAL
Qty: 30 TABLET | Refills: 2 | Status: SHIPPED | OUTPATIENT
Start: 2020-12-28 | End: 2021-03-29

## 2020-12-28 NOTE — TELEPHONE ENCOUNTER
Last Ov: 11/9/20, TB, virtual (f/u)  Last labs: A1c 11/5/20  Last Rx: pregabalin 100mg, #909, 3R 8/26/20    No future appointments. Per Protocol - pregabalin not on protocol, Rx pending. Other pass, refill sent.

## 2021-01-18 ENCOUNTER — TELEPHONE (OUTPATIENT)
Dept: INTERNAL MEDICINE CLINIC | Facility: CLINIC | Age: 76
End: 2021-01-18

## 2021-01-18 RX ORDER — ACETAMINOPHEN 500 MG
TABLET ORAL
Qty: 180 TABLET | Refills: 0 | Status: SHIPPED | OUTPATIENT
Start: 2021-01-18 | End: 2021-02-17

## 2021-01-18 NOTE — TELEPHONE ENCOUNTER
Pt due for cpe, please call pt and schedule appointment. Last visit-  03/22/2019 cpe    Last refill-  12/18/2020 mapap 500mg  [id number] 0R    Last labs-  07/09/2020 cbc, cmp, lipid, tsh    No future appointments.     Per Protocol- Pt due for cpe  Please ap

## 2021-01-22 ENCOUNTER — TELEPHONE (OUTPATIENT)
Dept: INTERNAL MEDICINE CLINIC | Facility: CLINIC | Age: 76
End: 2021-01-22

## 2021-01-22 NOTE — TELEPHONE ENCOUNTER
Signed form but part B and C not filled out. Can we ask her these questions.   I will place form in yellow bin

## 2021-01-28 NOTE — TELEPHONE ENCOUNTER
Last Ov: 11/9/20, TB, virtual (F/U)  Last labs: A1c 11/5/20  Last Rx: Januvia 50mg, #90, 0R 9/22/20    No future appointments. Per Protocol - failed, pt due for appt. Rx pending.

## 2021-01-30 RX ORDER — SITAGLIPTIN 50 MG/1
TABLET, FILM COATED ORAL
Qty: 90 TABLET | Refills: 1 | Status: SHIPPED | OUTPATIENT
Start: 2021-01-30

## 2021-02-01 DIAGNOSIS — Z23 NEED FOR VACCINATION: ICD-10-CM

## 2021-02-02 NOTE — TELEPHONE ENCOUNTER
Last Ov:11/9/20 phone  Upcoming appt: none  Last labs:11/5/20 a1c  Last Rx:4/2/20 mupirocin 2%    Per Protocol sent for review

## 2021-02-17 RX ORDER — PSEUDOEPHED/ACETAMINOPH/DIPHEN 30MG-500MG
TABLET ORAL
Qty: 180 TABLET | Refills: 0 | Status: SHIPPED | OUTPATIENT
Start: 2021-02-17 | End: 2021-03-22

## 2021-02-17 RX ORDER — B-COMPLEX WITH VITAMIN C
TABLET ORAL
Qty: 60 TABLET | Refills: 0 | Status: SHIPPED | OUTPATIENT
Start: 2021-02-17 | End: 2021-04-19

## 2021-02-17 RX ORDER — TRAMADOL HYDROCHLORIDE 50 MG/1
TABLET ORAL
Qty: 120 TABLET | Refills: 0 | Status: SHIPPED | OUTPATIENT
Start: 2021-02-17 | End: 2021-04-29

## 2021-02-17 RX ORDER — GLIPIZIDE 10 MG/1
TABLET, FILM COATED, EXTENDED RELEASE ORAL
Qty: 30 TABLET | Refills: 2 | Status: SHIPPED | OUTPATIENT
Start: 2021-02-17 | End: 2021-06-14

## 2021-02-17 NOTE — TELEPHONE ENCOUNTER
Last VISIT 01/10/20     Last REFILL 08/17/20 OYSTER SHELL CALCIUM/D 500-200 MG-UNIT Oral qty 60 w/5 refills  08/26/20 GLIPIZIDE ER 10 MG Oral Tablet 24 Hr qty 90 w/1 refill  11/09/20 Tramadol qty 120 w/0 refills    Last LABS 11/05/20 A1c done    No future

## 2021-02-22 RX ORDER — ASPIRIN 81 MG/1
TABLET, COATED ORAL
Qty: 90 TABLET | Refills: 0 | Status: SHIPPED | OUTPATIENT
Start: 2021-02-22 | End: 2021-06-01

## 2021-02-22 NOTE — TELEPHONE ENCOUNTER
Last Ov: 11/9/20, TB, virtual (DM F/U)  Last labs: A1c 11/5/20  Last Rx: apirin 81mg, #90, 1R 9/28/20    No future appointments. Per Protocol - not on protocol, Rx pending.

## 2021-02-25 ENCOUNTER — MED REC SCAN ONLY (OUTPATIENT)
Dept: INTERNAL MEDICINE CLINIC | Facility: CLINIC | Age: 76
End: 2021-02-25

## 2021-03-16 NOTE — TELEPHONE ENCOUNTER
Last Ov: 11/9/20, TB, virtual (DM f/u)  Last labs: A1c 11/5/20  Last Rx: actaminophen extra strength 500mg, #180, 0R 2/17/21    No future appointments. Per Protocol - not on protocol, Rx pending.

## 2021-03-17 RX ORDER — PSEUDOEPHED/ACETAMINOPH/DIPHEN 30MG-500MG
TABLET ORAL
Qty: 180 TABLET | Refills: 11 | OUTPATIENT
Start: 2021-03-17

## 2021-03-22 ENCOUNTER — TELEPHONE (OUTPATIENT)
Dept: INTERNAL MEDICINE CLINIC | Facility: CLINIC | Age: 76
End: 2021-03-22

## 2021-03-22 RX ORDER — ACETAMINOPHEN 500 MG
TABLET ORAL
Qty: 180 TABLET | Refills: 0 | Status: SHIPPED | OUTPATIENT
Start: 2021-03-22 | End: 2021-04-14

## 2021-03-22 NOTE — TELEPHONE ENCOUNTER
Received order from [employer] in regards to urinary incontinence supplies. Placed in TB bin for review.

## 2021-03-22 NOTE — TELEPHONE ENCOUNTER
Last visit- 11/09/2020 virtual     Last refill- 01/18/2021 mapap 500mg [id number] 0R    Last labs-  07/09/2020 cbc, cmp, lipid, tsh    No future appointments.     Per Protocol- Passed

## 2021-03-29 RX ORDER — LORATADINE 10 MG/1
TABLET ORAL
Qty: 30 TABLET | Refills: 0 | Status: SHIPPED | OUTPATIENT
Start: 2021-03-29

## 2021-03-29 NOTE — TELEPHONE ENCOUNTER
Last Ov: 11/9/20, TB, virtual (F/U)  Last labs: A1c 11/5/20  Last Rx: not seen on Rx list    No future appointments. Per Protocol - failed, pt due for appt. Rx pending.

## 2021-04-14 RX ORDER — LOSARTAN POTASSIUM 50 MG/1
TABLET ORAL
Qty: 60 TABLET | Refills: 0 | Status: SHIPPED | OUTPATIENT
Start: 2021-04-14 | End: 2021-06-14

## 2021-04-14 RX ORDER — PRAVASTATIN SODIUM 20 MG
TABLET ORAL
Qty: 90 TABLET | Refills: 0 | Status: SHIPPED | OUTPATIENT
Start: 2021-04-14 | End: 2021-07-06

## 2021-04-14 RX ORDER — ACETAMINOPHEN 500 MG
TABLET ORAL
Qty: 180 TABLET | Refills: 0 | Status: SHIPPED | OUTPATIENT
Start: 2021-04-14 | End: 2021-06-29

## 2021-04-14 NOTE — TELEPHONE ENCOUNTER
Last Ov: 11/9/20, TB, Virtual F/U  Last labs: A1c 11/5/20  Last Rx:   Losartan 50mg, #90, 1R 11/9/20  MPAP 500mg, #180, 0R 3/22/21    No future appointments. Per Protocol - losartan failed, pt due for appt. Rx pending. Other not on protocol.  All pend

## 2021-04-14 NOTE — TELEPHONE ENCOUNTER
Refill given, needs f/u in office within 1-2 months. Extended f/u for many problems.  I am guessing she is vaccinated for covid 19 by now and can come in

## 2021-04-14 NOTE — TELEPHONE ENCOUNTER
Last Ov:11/9/20 virtual  Upcoming appt:none  Last labs:11/5/20 a1c  Last Rx:11/3/20 pravastatin    Per Protocol sent for review

## 2021-04-15 NOTE — TELEPHONE ENCOUNTER
FWD to [facility], please assist in scheduling F/U appt per TB. Pt has existing fasting labs pending, please ask if pt can complete prior to scheduled appt.

## 2021-04-19 ENCOUNTER — TELEPHONE (OUTPATIENT)
Dept: INTERNAL MEDICINE CLINIC | Facility: CLINIC | Age: 76
End: 2021-04-19

## 2021-04-19 RX ORDER — ACETAMINOPHEN 500 MG
TABLET ORAL
Qty: 180 TABLET | Refills: 0 | OUTPATIENT
Start: 2021-04-19

## 2021-04-19 RX ORDER — B-COMPLEX WITH VITAMIN C
TABLET ORAL
Qty: 60 TABLET | Refills: 0 | Status: SHIPPED | OUTPATIENT
Start: 2021-04-19 | End: 2021-05-18

## 2021-04-19 NOTE — TELEPHONE ENCOUNTER
Last Ov: 11/9/20, TB, virtual (F/U)  Last labs: A1c 11/5/20  Last Rx:   MAPAP 500mg, #180, 0R 4/14/21  Oyster Shell/Vit D 500/200 mg/IU #60, 0R 2/17/21    No future appointments. Per Protocol - neither on protocol, both pending.

## 2021-04-21 NOTE — TELEPHONE ENCOUNTER
Pt calling to inquire why acetaminophen 500mg was refused?  She does have follow-up appt scheduled    Future Appointments   Date Time Provider Prashant Solis   5/18/2021  4:00 PM Ishan Louis MD [facility]

## 2021-04-22 RX ORDER — ACETAMINOPHEN 500 MG
TABLET ORAL
Qty: 180 TABLET | Refills: 11 | OUTPATIENT
Start: 2021-04-22

## 2021-04-22 NOTE — TELEPHONE ENCOUNTER
LM on pt preferred number notifying that Rx sent to pharmacy on 4/14/21, Rx not denied. Instructed to contact pharmacy in regards to when Rx will be filled.

## 2021-04-26 RX ORDER — PSEUDOEPHED/ACETAMINOPH/DIPHEN 30MG-500MG
TABLET ORAL
Qty: 180 TABLET | Refills: 0 | OUTPATIENT
Start: 2021-04-26

## 2021-04-26 NOTE — TELEPHONE ENCOUNTER
Last Ov: 11/9/20, TB, virtual  Last labs: A1c 11/5/20  Last Rx: acetaminophen 500mg, #180, 0R 2/17/21    Future Appointments   Date Time Provider Prashant Solis   5/18/2021  4:00 PM Linh Luna MD [facility]       Per Protocol - not on pro

## 2021-04-29 RX ORDER — PSEUDOEPHED/ACETAMINOPH/DIPHEN 30MG-500MG
TABLET ORAL
Qty: 180 TABLET | Refills: 0 | OUTPATIENT
Start: 2021-04-29

## 2021-04-29 RX ORDER — TRAMADOL HYDROCHLORIDE 50 MG/1
TABLET ORAL
Qty: 120 TABLET | Refills: 0 | Status: SHIPPED | OUTPATIENT
Start: 2021-04-29 | End: 2021-06-22

## 2021-04-29 RX ORDER — PREGABALIN 100 MG/1
CAPSULE ORAL
Qty: 90 CAPSULE | Refills: 0 | Status: SHIPPED | OUTPATIENT
Start: 2021-04-29 | End: 2021-06-22

## 2021-04-29 NOTE — TELEPHONE ENCOUNTER
Last VISIT 1/10/20 TB Traumatic Hematoma of Forehead    Last REFILL 2/17/21 Tramadol 120T 0R, Acetaminophen Extra Strength 180T 0R,  12/28/20 Pregabalin 90C 2R    Last LABS 11/5/20 HgA1c, 7/10/20 MA/Creat 7/9/20 CBC, CMP, Lipid, TSH    Future Appointments

## 2021-05-03 RX ORDER — PSEUDOEPHED/ACETAMINOPH/DIPHEN 30MG-500MG
TABLET ORAL
Qty: 180 TABLET | Refills: 0 | OUTPATIENT
Start: 2021-05-03

## 2021-05-03 RX ORDER — B-COMPLEX WITH VITAMIN C
TABLET ORAL
Qty: 60 TABLET | Refills: 0 | OUTPATIENT
Start: 2021-05-03

## 2021-05-03 NOTE — TELEPHONE ENCOUNTER
Last VISIT 1/10/20 TB Traumatic Hematoma pf forehead, 11/09/20 Virtual Phone E/M TB DM     Last REFILL 4/19/21 Oyster Shell Calcium/ D 500-200 60T 0R, 4/14/21 MAPAP 180T 0R    Last LABS 11/5/20 HgA1c, 7/10/20 MA/Creat 7/9/20 CBC, CMP, Lipid, TSH    Future

## 2021-05-17 ENCOUNTER — TELEPHONE (OUTPATIENT)
Dept: INTERNAL MEDICINE CLINIC | Facility: CLINIC | Age: 76
End: 2021-05-17

## 2021-05-17 NOTE — TELEPHONE ENCOUNTER
Pt calling to ask if 4:00pm appt tomorrow can be a phone visit versus in person. She is not able to do a video visit either. If not she will have to cancel. Please advise and let pt know.

## 2021-05-18 ENCOUNTER — VIRTUAL PHONE E/M (OUTPATIENT)
Dept: INTERNAL MEDICINE CLINIC | Facility: CLINIC | Age: 76
End: 2021-05-18
Payer: MEDICARE

## 2021-05-18 ENCOUNTER — NURSE ONLY (OUTPATIENT)
Dept: LAB | Age: 76
End: 2021-05-18
Attending: INTERNAL MEDICINE
Payer: MEDICARE

## 2021-05-18 DIAGNOSIS — E11.69 DIABETES MELLITUS TYPE 2 IN OBESE (HCC): ICD-10-CM

## 2021-05-18 DIAGNOSIS — I10 BENIGN ESSENTIAL HTN: ICD-10-CM

## 2021-05-18 DIAGNOSIS — J30.89 NON-SEASONAL ALLERGIC RHINITIS, UNSPECIFIED TRIGGER: ICD-10-CM

## 2021-05-18 DIAGNOSIS — E66.9 DIABETES MELLITUS TYPE 2 IN OBESE (HCC): Primary | ICD-10-CM

## 2021-05-18 DIAGNOSIS — M79.2 NEUROPATHIC PAIN: ICD-10-CM

## 2021-05-18 DIAGNOSIS — G60.0 CHARCOT-MARIE-TOOTH DISEASE: ICD-10-CM

## 2021-05-18 DIAGNOSIS — E11.69 DIABETES MELLITUS TYPE 2 IN OBESE (HCC): Primary | ICD-10-CM

## 2021-05-18 DIAGNOSIS — E66.9 DIABETES MELLITUS TYPE 2 IN OBESE (HCC): ICD-10-CM

## 2021-05-18 PROCEDURE — 83036 HEMOGLOBIN GLYCOSYLATED A1C: CPT

## 2021-05-18 PROCEDURE — 36415 COLL VENOUS BLD VENIPUNCTURE: CPT

## 2021-05-18 PROCEDURE — 99442 PHONE E/M BY PHYS 11-20 MIN: CPT | Performed by: INTERNAL MEDICINE

## 2021-05-18 PROCEDURE — 80053 COMPREHEN METABOLIC PANEL: CPT

## 2021-05-18 PROCEDURE — 80061 LIPID PANEL: CPT

## 2021-05-18 RX ORDER — B-COMPLEX WITH VITAMIN C
TABLET ORAL
Qty: 60 TABLET | Refills: 0 | Status: SHIPPED | OUTPATIENT
Start: 2021-05-18 | End: 2021-06-14

## 2021-05-18 RX ORDER — MONTELUKAST SODIUM 10 MG/1
10 TABLET ORAL NIGHTLY
Qty: 90 TABLET | Refills: 1 | Status: SHIPPED | OUTPATIENT
Start: 2021-05-18

## 2021-05-18 NOTE — PROGRESS NOTES
Due to COVID-19 ACTION PLAN, the patient's office visit was converted to a phone visit with informed patient consent.    Time Spent: 18 min    Subjective     HPI:   Iris De Leon is a 66year old female who presents for f/u-  DM2- she does not check BG, c face-to-face examination or emergency care. Patient advised to go to ER or call 911 for worsening symptoms or acute distress. Please note that the following visit was completed using two-way, real-time interactive audio communication.   This has been do

## 2021-05-18 NOTE — TELEPHONE ENCOUNTER
Last Ov: 11/9/20, TB, virtual  Last labs: A1c, Lipid, CMP 5/18/21  Last Rx: oyster shell calcium/vit D 500/200mg/iU, #60, 0R 4/19/21    Future Appointments   Date Time Provider Prashant Solis   5/18/2021  4:00 PM Tiara Jurado MD [facility]

## 2021-06-01 RX ORDER — ASPIRIN 81 MG/1
TABLET, COATED ORAL
Qty: 30 TABLET | Refills: 11 | Status: SHIPPED | OUTPATIENT
Start: 2021-06-01

## 2021-06-01 NOTE — TELEPHONE ENCOUNTER
Last Ov: 5/18/21, TB, virtual   Last labs: A1c, Lipid, CMP 5/18/21  Last Rx: aspirin 81mg, #90 0R 2/22/21    No future appointments.     Per Protocol - not on protocol

## 2021-06-07 RX ORDER — MIRABEGRON 50 MG/1
TABLET, FILM COATED, EXTENDED RELEASE ORAL
Qty: 90 TABLET | Refills: 0 | Status: SHIPPED | OUTPATIENT
Start: 2021-06-07

## 2021-06-07 NOTE — TELEPHONE ENCOUNTER
Last Ov: 5/18/21, TB, virtual F/U  Last labs: A1c, Lipid, CMP 5/18/21  Last Rx: Myrbetriq 50mg, #90, 1R 12/7/20    No future appointments.     Per Protocol - not on protocol, pending

## 2021-06-09 ENCOUNTER — TELEPHONE (OUTPATIENT)
Dept: INTERNAL MEDICINE CLINIC | Facility: CLINIC | Age: 76
End: 2021-06-09

## 2021-06-14 RX ORDER — CLOTRIMAZOLE 1 %
CREAM (GRAM) TOPICAL
Qty: 60 G | Refills: 0 | Status: SHIPPED | OUTPATIENT
Start: 2021-06-14 | End: 2021-07-14

## 2021-06-14 RX ORDER — LOSARTAN POTASSIUM 50 MG/1
TABLET ORAL
Qty: 180 TABLET | Refills: 0 | Status: SHIPPED | OUTPATIENT
Start: 2021-06-14 | End: 2021-08-13

## 2021-06-14 RX ORDER — B-COMPLEX WITH VITAMIN C
TABLET ORAL
Qty: 60 TABLET | Refills: 0 | Status: SHIPPED | OUTPATIENT
Start: 2021-06-14 | End: 2021-07-19

## 2021-06-14 RX ORDER — GLIPIZIDE 10 MG/1
TABLET, FILM COATED, EXTENDED RELEASE ORAL
Qty: 90 TABLET | Refills: 0 | Status: SHIPPED | OUTPATIENT
Start: 2021-06-14

## 2021-06-14 NOTE — TELEPHONE ENCOUNTER
Last Ov: 5/18/21, TB, virtual (F/U)  Last labs: A1c, Lipid, CMP 5/18/21  Last Rx:   Clotrimazole 1%, 60g, 10R 6/30/20  Oyster peter calcium 500/200mg, #60, 0R 5/18/21    Future Appointments   Date Time Provider Prashant Solis   8/17/2021  3:20 PM Saravanan Cummins

## 2021-06-16 ENCOUNTER — TELEPHONE (OUTPATIENT)
Dept: INTERNAL MEDICINE CLINIC | Facility: CLINIC | Age: 76
End: 2021-06-16

## 2021-06-16 NOTE — TELEPHONE ENCOUNTER
[facility]  Placed in TB bin for review and signature

## 2021-06-22 RX ORDER — TRAMADOL HYDROCHLORIDE 50 MG/1
TABLET ORAL
Qty: 120 TABLET | Refills: 0 | Status: SHIPPED | OUTPATIENT
Start: 2021-06-22

## 2021-06-22 RX ORDER — PREGABALIN 100 MG/1
CAPSULE ORAL
Qty: 90 CAPSULE | Refills: 0 | Status: SHIPPED | OUTPATIENT
Start: 2021-06-22

## 2021-06-22 NOTE — TELEPHONE ENCOUNTER
Last Ov: 5/18/21, TB, virtual (F/U)  Last labs: A1c, Lipid, CMP 5/18/21  Last Rx:   Pregabalin 100mg, #90, 0R 4/29/21  Tramadol 50mg, #120, 0R 4/29/21    Future Appointments   Date Time Provider Prashant [place]   8/17/2021  3:20 PM Ishan Louis MD [facility]

## 2021-06-29 RX ORDER — PSEUDOEPHED/ACETAMINOPH/DIPHEN 30MG-500MG
TABLET ORAL
Qty: 180 TABLET | Refills: 0 | Status: SHIPPED | OUTPATIENT
Start: 2021-06-29 | End: 2021-08-05

## 2021-06-29 NOTE — TELEPHONE ENCOUNTER
Last Ov: 5/18/21, TB, virtual  Last labs: A1c, Lipid, CMP 5/18/21  Last Rx: acetaminophen 500mg entered as historical    Future Appointments   Date Time Provider Prashant Solis   8/17/2021  3:20 PM Jesse Aldana MD [facility]       Per [place]

## 2021-07-06 RX ORDER — PRAVASTATIN SODIUM 20 MG
TABLET ORAL
Qty: 90 TABLET | Refills: 1 | Status: SHIPPED | OUTPATIENT
Start: 2021-07-06

## 2021-07-12 NOTE — TELEPHONE ENCOUNTER
Last Ov: 5/18/21, TB, virtual visit  Last labs: A1c, Lipid, CMP 5/18/21  Last Rx:   clotrimazole 1%, 60g, 0R 6/14/21  Omeprazole 20mg, #90, 3R 7/13/20    Future Appointments   Date Time Provider Prashant Solis   8/17/2021  3:20 PM Ye Galeas MD [facility]

## 2021-07-14 RX ORDER — CLOTRIMAZOLE 1 %
CREAM (GRAM) TOPICAL
Qty: 60 G | Refills: 0 | Status: SHIPPED | OUTPATIENT
Start: 2021-07-14

## 2021-07-14 RX ORDER — OMEPRAZOLE 20 MG/1
CAPSULE, DELAYED RELEASE ORAL
Qty: 90 CAPSULE | Refills: 0 | Status: SHIPPED | OUTPATIENT
Start: 2021-07-14

## 2021-07-15 ENCOUNTER — HOSPITAL ENCOUNTER (EMERGENCY)
Facility: HOSPITAL | Age: 76
Discharge: HOME | End: 2021-07-15
Attending: EMERGENCY MEDICINE
Payer: MEDICARE

## 2021-07-15 ENCOUNTER — APPOINTMENT (OUTPATIENT)
Dept: CT IMAGING | Facility: HOSPITAL | Age: 76
End: 2021-07-15
Attending: EMERGENCY MEDICINE
Payer: MEDICARE

## 2021-07-15 ENCOUNTER — TELEPHONE (OUTPATIENT)
Dept: INTERNAL MEDICINE CLINIC | Facility: CLINIC | Age: 76
End: 2021-07-15

## 2021-07-15 ENCOUNTER — APPOINTMENT (OUTPATIENT)
Dept: MRI IMAGING | Facility: HOSPITAL | Age: 76
End: 2021-07-15
Attending: EMERGENCY MEDICINE
Payer: MEDICARE

## 2021-07-15 VITALS
BODY MASS INDEX: 43.8 KG/M2 | HEART RATE: 81 BPM | WEIGHT: 289 LBS | SYSTOLIC BLOOD PRESSURE: 141 MMHG | TEMPERATURE: 97 F | DIASTOLIC BLOOD PRESSURE: 66 MMHG | RESPIRATION RATE: 18 BRPM | OXYGEN SATURATION: 100 % | HEIGHT: 68 IN

## 2021-07-15 DIAGNOSIS — S09.90XA INJURY OF HEAD, INITIAL ENCOUNTER: Primary | ICD-10-CM

## 2021-07-15 DIAGNOSIS — S01.01XA SCALP LACERATION, INITIAL ENCOUNTER: ICD-10-CM

## 2021-07-15 PROCEDURE — 12002 RPR S/N/AX/GEN/TRNK2.6-7.5CM: CPT

## 2021-07-15 PROCEDURE — 99285 EMERGENCY DEPT VISIT HI MDM: CPT

## 2021-07-15 PROCEDURE — 70551 MRI BRAIN STEM W/O DYE: CPT | Performed by: EMERGENCY MEDICINE

## 2021-07-15 PROCEDURE — 70450 CT HEAD/BRAIN W/O DYE: CPT | Performed by: EMERGENCY MEDICINE

## 2021-07-15 PROCEDURE — 12002 RPR S/N/AX/GEN/TRNK2.6-7.5CM: CPT | Performed by: EMERGENCY MEDICINE

## 2021-07-15 PROCEDURE — 99284 EMERGENCY DEPT VISIT MOD MDM: CPT

## 2021-07-15 RX ORDER — TRAMADOL HYDROCHLORIDE 50 MG/1
50 TABLET ORAL ONCE
Status: COMPLETED | OUTPATIENT
Start: 2021-07-15 | End: 2021-07-15

## 2021-07-15 NOTE — TELEPHONE ENCOUNTER
Calling to inform TB pt was sent to the ER due to a fall she was found face down in the bathroom 911 was called she will be faxing the incident report.

## 2021-07-15 NOTE — ED PROVIDER NOTES
CT BRAIN OR HEAD ([id number])    Result Date: 7/15/2021            PROCEDURE:  CT BRAIN OR HEAD (92121)  COMPARISON:  [facility] , CT, CT BRAIN OR HEAD ([id number]), 12/27/2019, 1:46 PM.  INDICATIONS:  fall.  Head Lac  TECHNIQUE:  Noncontrast CT scanning is performed thr exam is somewhat limited due to motion. Please note that a complete exam could not be performed as the patient declined further imaging. INTRACRANIAL:  Within the limitations of the exam no definite intracranial hemorrhage is seen.   There is no significan

## 2021-07-15 NOTE — ED QUICK NOTES
Er md applied x4 staples on her laceration, tolerated by patient. Pt and her sister [place] informed of pt's test reports and plan of care. Verbalizing understanding.  Multiple attempts of calling to [facility] living , no response and unsucces

## 2021-07-15 NOTE — ED INITIAL ASSESSMENT (HPI)
Per medics, pt stays at the [facility], was transferring from the toilet to her wheelchair and fell hitting head on floor. sustained lac to left side of her head. Bleeding controlled. Not taking blood thinners.  No loc

## 2021-07-15 NOTE — ED PROVIDER NOTES
Patient Seen in: [facility] Emergency Department      History   Patient presents with:  Fall    Stated Complaint: fall. Head Lac    HPI/Subjective:   HPI    64-year-old female presents with a scalp laceration after a head injury.   Approximately 1 ho Vaping Use: Never used    Alcohol use: No    Drug use: No             Review of Systems    Positive for stated complaint: fall. Head Lac  Other systems are as noted in HPI. Constitutional and vital signs reviewed.       All other systems reviewed and negat PATIENT STATED HISTORY: (As transcribed by Technologist)  Patient  fell forward hitting left side of head   FINDINGS: 3 mm focus of hyperdensity adjacent to the right frontal lobe (image 40) is noted.   Lucencies in the deep periventricular white matter are

## 2021-07-16 ENCOUNTER — TELEPHONE (OUTPATIENT)
Dept: INTERNAL MEDICINE CLINIC | Facility: CLINIC | Age: 76
End: 2021-07-16

## 2021-07-16 NOTE — TELEPHONE ENCOUNTER
Lm for pt to call office. Pt needs to schedule ER fu for Fall, Head laceration with Dr. Dayton Pemberton.

## 2021-07-19 RX ORDER — B-COMPLEX WITH VITAMIN C
TABLET ORAL
Qty: 60 TABLET | Refills: 2 | Status: SHIPPED | OUTPATIENT
Start: 2021-07-19

## 2021-07-19 NOTE — TELEPHONE ENCOUNTER
Patient scheduled with Dr. Ahmet Connolly for 7/23/21 and asked to reschedule to earlier in the day.

## 2021-07-19 NOTE — TELEPHONE ENCOUNTER
Last VISIT 5/18/21, TB, DM f/u    Last REFILL oyster shell calcium d 500/200 mg/iu #60 0R 6/14/21    Last LABS A1c Lipid CMP 5/18/21    Future Appointments   Date Time Provider Prashant Solis   7/23/2021  3:00 PM Eleuterio Castro MD [facility]

## 2021-07-28 RX ORDER — ACETAMINOPHEN 500 MG
TABLET ORAL
Qty: 180 TABLET | Refills: 0 | OUTPATIENT
Start: 2021-07-28

## 2021-07-28 NOTE — TELEPHONE ENCOUNTER
Last VISIT 5/18/21 TB virtual f/u    Last REFILL APAP 500mg, #180, 0R 6/29/21    Last LABS A1c, Lipid, CMP 5/18/21    Future Appointments   Date Time Provider Prashant Solis   8/17/2021  3:20 PM Rudolph Lopez MD [facility]         Per Kayceeshira Hardy

## 2021-07-29 RX ORDER — ACETAMINOPHEN 500 MG
TABLET ORAL
Qty: 180 TABLET | Refills: 0 | OUTPATIENT
Start: 2021-07-29

## 2021-07-29 NOTE — TELEPHONE ENCOUNTER
ASPIRIN LOW DOSE 81 MG Oral Tab EC 30 tablet 11 6/1/2021     Sig: TAKE ONE TABLET BY MOUTH EVERY MORNING    Sent to pharmacy as: Aspirin Low Dose 81 MG Oral Tablet Delayed Release (aspirin)    E-Prescribing Status: Receipt confirmed by pharmacy (6/1/2021 1

## 2021-08-02 NOTE — TELEPHONE ENCOUNTER
LOV: 6/13/18 TB  FOV: 9/11/18  LAST RX: June 3rd 2018  LAST LABS:6/6/18 post glucose  PER PROTOCOL: No

## 2021-08-03 RX ORDER — ACETAMINOPHEN 500 MG
TABLET ORAL
Qty: 180 TABLET | Refills: 0 | OUTPATIENT
Start: 2021-08-03

## 2021-08-03 NOTE — TELEPHONE ENCOUNTER
[facility] calling regarding refill of   ACETAMINOPHEN EXTRA STRENGTH 500 MG Oral Tab 180 tablet 0 6/29/2021    Sig:   TAKE 1 OR 2 TABLETS 3 TIMES A DAY FOR PAIN       Per Pharmacy the patient was given refill of 180 tabs on 6/29/21 and patient is crissymala

## 2021-08-05 RX ORDER — ACETAMINOPHEN 500 MG
TABLET ORAL
Qty: 180 TABLET | Refills: 0 | Status: SHIPPED | OUTPATIENT
Start: 2021-08-05

## 2021-08-10 ENCOUNTER — NURSE ONLY (OUTPATIENT)
Dept: LAB | Age: 76
End: 2021-08-10
Attending: INTERNAL MEDICINE
Payer: MEDICARE

## 2021-08-10 DIAGNOSIS — R05.9 COUGHING: ICD-10-CM

## 2021-08-10 DIAGNOSIS — R09.81 NASAL CONGESTION: ICD-10-CM

## 2021-08-10 DIAGNOSIS — Z87.09 HX OF ACUTE BRONCHITIS WITH BRONCHOSPASM: ICD-10-CM

## 2021-08-10 DIAGNOSIS — I10 BENIGN ESSENTIAL HTN: ICD-10-CM

## 2021-08-10 LAB
ALBUMIN SERPL-MCNC: 2.5 G/DL (ref 3.4–5)
ALBUMIN/GLOB SERPL: 0.7 {RATIO} (ref 1–2)
ALP LIVER SERPL-CCNC: 109 U/L
ALT SERPL-CCNC: 13 U/L
ANION GAP SERPL CALC-SCNC: 6 MMOL/L (ref 0–18)
AST SERPL-CCNC: 14 U/L (ref 15–37)
BASOPHILS # BLD AUTO: 0.05 X10(3) UL (ref 0–0.2)
BASOPHILS NFR BLD AUTO: 0.7 %
BILIRUB SERPL-MCNC: 0.4 MG/DL (ref 0.1–2)
BUN BLD-MCNC: 22 MG/DL (ref 7–18)
CALCIUM BLD-MCNC: 8.8 MG/DL (ref 8.5–10.1)
CHLORIDE SERPL-SCNC: 107 MMOL/L (ref 98–112)
CO2 SERPL-SCNC: 30 MMOL/L (ref 21–32)
CREAT BLD-MCNC: 0.76 MG/DL
EOSINOPHIL # BLD AUTO: 0.34 X10(3) UL (ref 0–0.7)
EOSINOPHIL NFR BLD AUTO: 4.6 %
ERYTHROCYTE [DISTWIDTH] IN BLOOD BY AUTOMATED COUNT: 14.6 %
GLOBULIN PLAS-MCNC: 3.8 G/DL (ref 2.8–4.4)
GLUCOSE BLD-MCNC: 57 MG/DL (ref 70–99)
HCT VFR BLD AUTO: 42.2 %
HGB BLD-MCNC: 13.3 G/DL
IMM GRANULOCYTES # BLD AUTO: 0.07 X10(3) UL (ref 0–1)
IMM GRANULOCYTES NFR BLD: 0.9 %
LYMPHOCYTES # BLD AUTO: 1.58 X10(3) UL (ref 1–4)
LYMPHOCYTES NFR BLD AUTO: 21.4 %
M PROTEIN MFR SERPL ELPH: 6.3 G/DL (ref 6.4–8.2)
MCH RBC QN AUTO: 30.4 PG (ref 26–34)
MCHC RBC AUTO-ENTMCNC: 31.5 G/DL (ref 31–37)
MCV RBC AUTO: 96.6 FL
MONOCYTES # BLD AUTO: 0.79 X10(3) UL (ref 0.1–1)
MONOCYTES NFR BLD AUTO: 10.7 %
NEUTROPHILS # BLD AUTO: 4.55 X10 (3) UL (ref 1.5–7.7)
NEUTROPHILS # BLD AUTO: 4.55 X10(3) UL (ref 1.5–7.7)
NEUTROPHILS NFR BLD AUTO: 61.7 %
OSMOLALITY SERPL CALC.SUM OF ELEC: 297 MOSM/KG (ref 275–295)
PATIENT FASTING Y/N/NP: YES
PLATELET # BLD AUTO: 151 10(3)UL (ref 150–450)
POTASSIUM SERPL-SCNC: 3.6 MMOL/L (ref 3.5–5.1)
RBC # BLD AUTO: 4.37 X10(6)UL
SODIUM SERPL-SCNC: 143 MMOL/L (ref 136–145)
WBC # BLD AUTO: 7.4 X10(3) UL (ref 4–11)

## 2021-08-10 PROCEDURE — 36415 COLL VENOUS BLD VENIPUNCTURE: CPT

## 2021-08-10 PROCEDURE — 85025 COMPLETE CBC W/AUTO DIFF WBC: CPT

## 2021-08-10 PROCEDURE — 80053 COMPREHEN METABOLIC PANEL: CPT

## 2021-08-11 DIAGNOSIS — J30.89 NON-SEASONAL ALLERGIC RHINITIS, UNSPECIFIED TRIGGER: ICD-10-CM

## 2021-08-12 ENCOUNTER — TELEPHONE (OUTPATIENT)
Dept: INTERNAL MEDICINE CLINIC | Facility: CLINIC | Age: 76
End: 2021-08-12

## 2021-08-12 NOTE — TELEPHONE ENCOUNTER
Pt called to cx her wellness visit w/TB on 8/17 as she now has PCP thru [facility] and she will not be coming back here to see TB-please remove TB as pcp    FYI I did remove TB as pcp already

## 2021-08-13 RX ORDER — LOSARTAN POTASSIUM 50 MG/1
100 TABLET ORAL DAILY
Qty: 120 TABLET | Refills: 0 | Status: SHIPPED | OUTPATIENT
Start: 2021-08-13

## 2021-08-13 RX ORDER — MONTELUKAST SODIUM 10 MG/1
TABLET ORAL
Refills: 0 | OUTPATIENT
Start: 2021-08-13

## 2021-08-19 RX ORDER — GLIPIZIDE 10 MG/1
TABLET, FILM COATED, EXTENDED RELEASE ORAL
Qty: 30 TABLET | Refills: 11 | OUTPATIENT
Start: 2021-08-19

## 2021-10-18 RX ORDER — HYDROCORTISONE 25 MG/G
CREAM TOPICAL
Qty: 30 G | Refills: 0 | OUTPATIENT
Start: 2021-10-18

## 2021-11-09 ENCOUNTER — APPOINTMENT (OUTPATIENT)
Dept: WOUND CARE | Facility: HOSPITAL | Age: 76
End: 2021-11-09
Attending: FAMILY MEDICINE
Payer: MEDICARE

## 2021-11-11 ENCOUNTER — NURSE ONLY (OUTPATIENT)
Dept: LAB | Age: 76
End: 2021-11-11
Attending: INTERNAL MEDICINE
Payer: MEDICARE

## 2021-11-11 DIAGNOSIS — B99.9 BLOOD INFECTION: ICD-10-CM

## 2021-11-11 DIAGNOSIS — I10 BENIGN ESSENTIAL HTN: ICD-10-CM

## 2021-11-11 DIAGNOSIS — G60.0 CHARCOT-MARIE-TOOTH DISEASE: ICD-10-CM

## 2021-11-11 DIAGNOSIS — R77.8 ELEVATED TROPONIN: ICD-10-CM

## 2021-11-11 PROCEDURE — 85025 COMPLETE CBC W/AUTO DIFF WBC: CPT

## 2021-11-11 PROCEDURE — 80053 COMPREHEN METABOLIC PANEL: CPT

## 2021-11-11 PROCEDURE — 36415 COLL VENOUS BLD VENIPUNCTURE: CPT

## 2021-11-18 ENCOUNTER — NURSE ONLY (OUTPATIENT)
Dept: LAB | Age: 76
End: 2021-11-18
Attending: INTERNAL MEDICINE
Payer: MEDICARE

## 2021-11-18 DIAGNOSIS — E11.69 DIABETES MELLITUS TYPE 2 IN OBESE (HCC): ICD-10-CM

## 2021-11-18 DIAGNOSIS — I10 BENIGN ESSENTIAL HTN: ICD-10-CM

## 2021-11-18 DIAGNOSIS — E66.9 DIABETES MELLITUS TYPE 2 IN OBESE (HCC): ICD-10-CM

## 2021-11-18 PROCEDURE — 84132 ASSAY OF SERUM POTASSIUM: CPT

## 2021-11-18 PROCEDURE — 84450 TRANSFERASE (AST) (SGOT): CPT

## 2021-11-18 PROCEDURE — 36415 COLL VENOUS BLD VENIPUNCTURE: CPT

## 2021-11-18 PROCEDURE — 83036 HEMOGLOBIN GLYCOSYLATED A1C: CPT

## 2021-11-18 PROCEDURE — 80053 COMPREHEN METABOLIC PANEL: CPT

## 2021-11-18 PROCEDURE — 85025 COMPLETE CBC W/AUTO DIFF WBC: CPT

## 2021-11-19 ENCOUNTER — OFFICE VISIT (OUTPATIENT)
Dept: WOUND CARE | Facility: HOSPITAL | Age: 76
End: 2021-11-19
Attending: INTERNAL MEDICINE
Payer: MEDICARE

## 2021-11-19 VITALS
HEIGHT: 63 IN | SYSTOLIC BLOOD PRESSURE: 146 MMHG | HEART RATE: 73 BPM | BODY MASS INDEX: 51.03 KG/M2 | TEMPERATURE: 99 F | WEIGHT: 288 LBS | DIASTOLIC BLOOD PRESSURE: 67 MMHG | RESPIRATION RATE: 16 BRPM

## 2021-11-19 DIAGNOSIS — L84 CALLUS: ICD-10-CM

## 2021-11-19 DIAGNOSIS — L98.499 DIABETES MELLITUS WITH SKIN ULCER (HCC): ICD-10-CM

## 2021-11-19 DIAGNOSIS — E11.622 DIABETES MELLITUS WITH SKIN ULCER (HCC): ICD-10-CM

## 2021-11-19 DIAGNOSIS — Z99.3 DEPENDENCE ON WHEELCHAIR: ICD-10-CM

## 2021-11-19 DIAGNOSIS — S61.401A: Primary | ICD-10-CM

## 2021-11-19 PROCEDURE — 82962 GLUCOSE BLOOD TEST: CPT | Performed by: INTERNAL MEDICINE

## 2021-11-19 PROCEDURE — 97597 DBRDMT OPN WND 1ST 20 CM/<: CPT | Performed by: INTERNAL MEDICINE

## 2021-11-19 PROCEDURE — 99215 OFFICE O/P EST HI 40 MIN: CPT

## 2021-11-19 NOTE — PATIENT INSTRUCTIONS
Patient Instructions and orders for Wound Care      Wound Cleaning and Dressings:    • Wash your hands with soap and water. Always wear gloves while changing dressings. Donot touch wound / peter-wound skin with un-gloved hands.  Remove old dressing, discard your primary physician or go to the nearest emergency room.

## 2021-11-19 NOTE — PROGRESS NOTES
[address] CONSULTATION NOTE  Shahnaz Avila MD  11/19/2021    Subjective   Bharathi Ewing is a 66year old female. Patient presents with:  Wound Care: Initial visit for right hand wounds. Wound has been open for about two months.  Started as Problem List:  Patient Active Problem List:     Benign essential HTN     Charcot-Rima-Tooth disease     Anxiety state     Diabetes mellitus (HCC)-controlled     Hypertension     Osteoarthritis     ABDIAZIZ (obstructive sleep apnea)     ABDIAZIZ on CPAP     Gout DIARRHEA  Lisinopril              Coughing  Other                       Comment:Please see extensive Neurotoxic Medication List in             Media before prescribing medications  [facility]*    RASH  Current Meds:  Current Outpatient traZODone HCl 100 MG Oral Tab Take 1 tablet (100 mg total) by mouth nightly. 90 tablet 0   • hydrocortisone 2.5 % External Cream Apply topically every 12 (twelve) hours as needed.  To affected areas, use with clotrimazole cream.     • FLUoxetine HCl 20 MG O Surface Area (cm^2) 0.03 cm^2 11/19/21 1324   Wound Depth (cm) 0.1 cm 11/19/21 1324   Wound Volume (cm^3) 0.003 cm^3 11/19/21 1324   Margins Well-defined edges 11/19/21 1324   Mckenzie-wound Assessment Callous;Dry 11/19/21 1324   Wound Granulation Tissue Pink; disorder     History of amputation of finger     Vitamin D deficiency     Diabetes mellitus, controlled ([address]     Chronic depression     Neuropathic pain     Seborrheic dermatitis     History of MRSA infection     Ulcer of toe ([address]     Dyslipidemia     Le Dressings:    • Wash your hands with soap and water. Always wear gloves while changing dressings. Donot touch wound / pteer-wound skin with un-gloved hands. Remove old dressing, discard and place into trash.       Apply honey gel on wound base - change dress room.            Orders  Orders Placed This Encounter      Debridement    Caregiver Education: There are no barriers to learning. Medical education for above diagnosis given. Answered all questions. Outcome: Patient verbalizes understanding.  Patient i

## 2021-11-19 NOTE — PROGRESS NOTES
Patient ID: Iris De Leon is a 66year old female. Debridement   Wound 11/19/21 #1 Right Hand Pressure Injury Hand Right    Consent obtained? verbal  Consent given by: patient  Risks discussed?  procedural risks discussed  Time out called at 11/19/2021

## 2021-11-19 NOTE — PROGRESS NOTES
Weekly Wound Education Note    Teaching Provided To: Patient  Training Topics: Cleasing and general instructions; Discharge instructions;Dressing  Training Method: Explain/Verbal;Demonstration  Training Response: Patient responds and understands        Note

## 2021-12-03 ENCOUNTER — OFFICE VISIT (OUTPATIENT)
Dept: WOUND CARE | Facility: HOSPITAL | Age: 76
End: 2021-12-03
Attending: INTERNAL MEDICINE
Payer: MEDICARE

## 2021-12-03 VITALS
DIASTOLIC BLOOD PRESSURE: 72 MMHG | RESPIRATION RATE: 16 BRPM | TEMPERATURE: 97 F | SYSTOLIC BLOOD PRESSURE: 133 MMHG | HEART RATE: 71 BPM

## 2021-12-03 DIAGNOSIS — L84 CALLUS: ICD-10-CM

## 2021-12-03 DIAGNOSIS — S61.401A: Primary | ICD-10-CM

## 2021-12-03 DIAGNOSIS — Z99.3 DEPENDENCE ON WHEELCHAIR: ICD-10-CM

## 2021-12-03 DIAGNOSIS — E11.622 DIABETES MELLITUS WITH SKIN ULCER (HCC): ICD-10-CM

## 2021-12-03 DIAGNOSIS — L98.499 DIABETES MELLITUS WITH SKIN ULCER (HCC): ICD-10-CM

## 2021-12-03 PROCEDURE — 82962 GLUCOSE BLOOD TEST: CPT | Performed by: INTERNAL MEDICINE

## 2021-12-03 PROCEDURE — 99213 OFFICE O/P EST LOW 20 MIN: CPT

## 2021-12-03 NOTE — PATIENT INSTRUCTIONS
Patient Instructions and orders for Wound Care        Wound Cleaning and Dressings:     · Wash your hands with soap and water. Always wear gloves while changing dressings. Donot touch wound / peter-wound skin with un-gloved hands.  Remove old dressing, disca please call your primary physician or go to the nearest emergency room.

## 2021-12-03 NOTE — PROGRESS NOTES
[facility] NOTE  Melchor Linares MD  12/3/2021    Chief Complaint:   Patient presents with:  Wound Care: Patient is here for a wound care follow up. She does have some intermittent pain to the wound.        HPI:   Justyn Manuel EVERY MORNING 30 tablet 11   • Montelukast Sodium 10 MG Oral Tab Take 1 tablet (10 mg total) by mouth nightly.  90 tablet 1   • ALLERGY RELIEF 10 MG Oral Tab TAKE ONE TABLET BY MOUTH DAILY 30 tablet 0   • MUPIROCIN 2 % External Ointment APPLY TO OPEN AREA O 1518   Drainage Description Yellow;Serous 12/03/21 1518   Treatments Honey Gel 11/19/21 1324   Dressing 2x2s; Tape 11/19/21 1324   Wound Length (cm) 0.5 cm 12/03/21 1518   Wound Width (cm) 1.5 cm 12/03/21 1518   Wound Surface Area (cm^2) 0.75 cm^2 12/03/21 hematuria     Cellulitis of left lower extremity     Concussion with no loss of consciousness     Concussion without loss of consciousness,     Traumatic hematoma of forehead, subsequent encounter     Foot pain, left        MANAGEMENT    PROCEDURES:   None carefully, it is very important. If you do not follow all instructions, you are at  risk of your wound not healing, infection, possible loss of limb and even end of life.   2. Please call the clinic during regular business hours ( 7:30 AM - 5:30 PM) if you PM

## 2021-12-03 NOTE — PROGRESS NOTES
Weekly Wound Education Note    Teaching Provided To: Patient  Training Topics: Cleasing and general instructions; Discharge instructions;Dressing  Training Method: Explain/Verbal  Training Response: Patient responds and understands        Notes: Switched to

## 2021-12-07 ENCOUNTER — NURSE ONLY (OUTPATIENT)
Dept: LAB | Age: 76
End: 2021-12-07
Attending: INTERNAL MEDICINE
Payer: MEDICARE

## 2021-12-07 DIAGNOSIS — R79.9 ELEVATED BUN: ICD-10-CM

## 2021-12-07 DIAGNOSIS — I10 HYPERTENSION, UNSPECIFIED TYPE: ICD-10-CM

## 2021-12-07 PROCEDURE — 80053 COMPREHEN METABOLIC PANEL: CPT

## 2021-12-07 PROCEDURE — 36415 COLL VENOUS BLD VENIPUNCTURE: CPT

## 2021-12-07 PROCEDURE — 85025 COMPLETE CBC W/AUTO DIFF WBC: CPT

## 2021-12-17 ENCOUNTER — HOSPITAL ENCOUNTER (EMERGENCY)
Facility: HOSPITAL | Age: 76
Discharge: HOME | End: 2021-12-17
Attending: EMERGENCY MEDICINE
Payer: MEDICARE

## 2021-12-17 VITALS
RESPIRATION RATE: 18 BRPM | HEIGHT: 64 IN | DIASTOLIC BLOOD PRESSURE: 87 MMHG | OXYGEN SATURATION: 99 % | WEIGHT: 287.94 LBS | BODY MASS INDEX: 49.16 KG/M2 | HEART RATE: 84 BPM | SYSTOLIC BLOOD PRESSURE: 166 MMHG | TEMPERATURE: 97 F
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
## 2021-12-17 PROCEDURE — 99283 EMERGENCY DEPT VISIT LOW MDM: CPT

## 2021-12-17 NOTE — ED INITIAL ASSESSMENT (HPI)
PT TO ED FROM [facility] AFTER SLIPPING OUT OF WHEELCHAIR AND ONTO FLOOR LAST NIGHT. PT SLEPT ON FLOOR UNTIL STAFF FOUND HER THIS AM. PT WITH C/O  LEFT ARM TINGLING AFTER LYING ON LEFT SIDE ALL NIGHT, CAP REFILL <3 WITH GOOD REFILL. NO OTHER COMPLAINTS.  P

## 2021-12-17 NOTE — ED PROVIDER NOTES
Patient Seen in: [facility] Emergency Department      History   Patient presents with:  Fall    Stated Complaint: fall    Subjective:   HPI    53-year-old female presents for evaluation after a fall at assisted living. Patient is wheelchair-bound.  Amira Benitez Smokeless tobacco: Never Used    Vaping Use      Vaping Use: Never used    Alcohol use: No    Drug use: No             Review of Systems    Positive for stated complaint: fall  Other systems are as noted in HPI. Constitutional and vital signs reviewed.

## 2021-12-20 ENCOUNTER — OFFICE VISIT (OUTPATIENT)
Dept: WOUND CARE | Facility: HOSPITAL | Age: 76
End: 2021-12-20
Attending: INTERNAL MEDICINE
Payer: MEDICARE

## 2021-12-20 VITALS
TEMPERATURE: 98 F | HEART RATE: 80 BPM | SYSTOLIC BLOOD PRESSURE: 152 MMHG | DIASTOLIC BLOOD PRESSURE: 89 MMHG | RESPIRATION RATE: 16 BRPM

## 2021-12-20 DIAGNOSIS — L98.499 DIABETES MELLITUS WITH SKIN ULCER (HCC): ICD-10-CM

## 2021-12-20 DIAGNOSIS — E11.622 DIABETES MELLITUS WITH SKIN ULCER (HCC): ICD-10-CM

## 2021-12-20 DIAGNOSIS — Z99.3 DEPENDENCE ON WHEELCHAIR: ICD-10-CM

## 2021-12-20 DIAGNOSIS — S61.401A: Primary | ICD-10-CM

## 2021-12-20 PROCEDURE — 99213 OFFICE O/P EST LOW 20 MIN: CPT

## 2021-12-20 PROCEDURE — 82962 GLUCOSE BLOOD TEST: CPT | Performed by: INTERNAL MEDICINE

## 2021-12-20 NOTE — PROGRESS NOTES
[facility] NOTE  Shahnaz Avila MD  12/20/2021    Chief Complaint:   Patient presents with:  Wound Care: Patient is here for a [facility] follow up. She denies any pain to the wound.        HPI:   Subjective   Bharathi Ewing is a 66 year Montelukast Sodium 10 MG Oral Tab Take 1 tablet (10 mg total) by mouth nightly.  90 tablet 1   • ALLERGY RELIEF 10 MG Oral Tab TAKE ONE TABLET BY MOUTH DAILY 30 tablet 0   • MUPIROCIN 2 % External Ointment APPLY TO OPEN AREA ON LEG 2 TIMES A DAY AS NEEDED 2 Yellow;Serous 12/03/21 1518   Treatments Honey Gel 11/19/21 1324   Dressing 2x2s; Tape 11/19/21 1324   Wound Length (cm) 0.4 cm 12/20/21 1322   Wound Width (cm) 1.5 cm 12/20/21 1322   Wound Surface Area (cm^2) 0.6 cm^2 12/20/21 1322   Wound Depth (cm) 0 cm extremity     Concussion with no loss of consciousness     Concussion without loss of consciousness,     Traumatic hematoma of forehead, subsequent encounter     Foot pain, left        MANAGEMENT    PROCEDURES:    Nonselective debridement of scab and callu you are at [facility] of your wound not healing, infection, possible loss of limb and even end of life.   2. Please call the clinic during regular business hours ( 7:30 AM - 5:30 PM) if you notice increased redness, warmth, pain or pus like drainage or start run

## 2021-12-20 NOTE — PROGRESS NOTES
Weekly Wound Education Note    Teaching Provided To: Patient  Training Topics: Cleasing and general instructions;Dressing;Off-loading  Training Method: Demonstration;Explain/Verbal  Training Response: Patient responds and understands        Notes: wound he

## 2021-12-23 ENCOUNTER — NURSE ONLY (OUTPATIENT)
Dept: LAB | Age: 76
End: 2021-12-23
Attending: INTERNAL MEDICINE
Payer: MEDICARE

## 2021-12-23 DIAGNOSIS — E66.9 DIABETES MELLITUS TYPE 2 IN OBESE (HCC): ICD-10-CM

## 2021-12-23 DIAGNOSIS — E11.69 DIABETES MELLITUS TYPE 2 IN OBESE (HCC): ICD-10-CM

## 2021-12-23 DIAGNOSIS — R35.0 URINARY FREQUENCY: ICD-10-CM

## 2021-12-23 DIAGNOSIS — D69.6 THROMBOCYTOPENIA (HCC): ICD-10-CM

## 2021-12-23 DIAGNOSIS — R30.0 DYSURIA: ICD-10-CM

## 2021-12-23 PROCEDURE — 85025 COMPLETE CBC W/AUTO DIFF WBC: CPT

## 2021-12-23 PROCEDURE — 87086 URINE CULTURE/COLONY COUNT: CPT

## 2021-12-23 PROCEDURE — 87186 SC STD MICRODIL/AGAR DIL: CPT

## 2021-12-23 PROCEDURE — 80053 COMPREHEN METABOLIC PANEL: CPT

## 2021-12-23 PROCEDURE — 87088 URINE BACTERIA CULTURE: CPT

## 2021-12-23 PROCEDURE — 81001 URINALYSIS AUTO W/SCOPE: CPT

## 2021-12-23 PROCEDURE — 36415 COLL VENOUS BLD VENIPUNCTURE: CPT

## 2021-12-28 ENCOUNTER — NURSE ONLY (OUTPATIENT)
Dept: LAB | Age: 76
End: 2021-12-28
Attending: INTERNAL MEDICINE
Payer: MEDICARE

## 2021-12-28 DIAGNOSIS — E66.9 DIABETES MELLITUS TYPE 2 IN OBESE (HCC): ICD-10-CM

## 2021-12-28 DIAGNOSIS — M19.90 OSTEOARTHRITIS, UNSPECIFIED OSTEOARTHRITIS TYPE, UNSPECIFIED SITE: ICD-10-CM

## 2021-12-28 DIAGNOSIS — E11.69 DIABETES MELLITUS TYPE 2 IN OBESE (HCC): ICD-10-CM

## 2021-12-28 DIAGNOSIS — B99.9 BLOOD INFECTION: ICD-10-CM

## 2021-12-28 DIAGNOSIS — I10 BENIGN ESSENTIAL HTN: ICD-10-CM

## 2021-12-28 PROCEDURE — 36415 COLL VENOUS BLD VENIPUNCTURE: CPT

## 2021-12-28 PROCEDURE — 85025 COMPLETE CBC W/AUTO DIFF WBC: CPT

## 2021-12-28 PROCEDURE — 80053 COMPREHEN METABOLIC PANEL: CPT

## 2022-02-01 ENCOUNTER — NURSE ONLY (OUTPATIENT)
Dept: LAB | Age: 77
End: 2022-02-01
Attending: INTERNAL MEDICINE
Payer: MEDICARE

## 2022-02-01 DIAGNOSIS — D69.6 THROMBOCYTOPENIA (HCC): ICD-10-CM

## 2022-02-01 DIAGNOSIS — I10 BENIGN ESSENTIAL HTN: ICD-10-CM

## 2022-02-01 LAB
ALBUMIN SERPL-MCNC: 2.9 G/DL (ref 3.4–5)
ALBUMIN/GLOB SERPL: 1 {RATIO} (ref 1–2)
ALP LIVER SERPL-CCNC: 129 U/L
ALT SERPL-CCNC: 12 U/L
ANION GAP SERPL CALC-SCNC: 4 MMOL/L (ref 0–18)
AST SERPL-CCNC: 14 U/L (ref 15–37)
BASOPHILS # BLD AUTO: 0.03 X10(3) UL (ref 0–0.2)
BILIRUB SERPL-MCNC: 0.2 MG/DL (ref 0.1–2)
BUN BLD-MCNC: 22 MG/DL (ref 7–18)
CALCIUM BLD-MCNC: 8.9 MG/DL (ref 8.5–10.1)
CHLORIDE SERPL-SCNC: 110 MMOL/L (ref 98–112)
CO2 SERPL-SCNC: 29 MMOL/L (ref 21–32)
CREAT BLD-MCNC: 0.62 MG/DL
EOSINOPHIL # BLD AUTO: 0.37 X10(3) UL (ref 0–0.7)
EOSINOPHIL NFR BLD AUTO: 4.2 %
ERYTHROCYTE [DISTWIDTH] IN BLOOD BY AUTOMATED COUNT: 14 %
FASTING STATUS PATIENT QL REPORTED: YES
GLOBULIN PLAS-MCNC: 2.8 G/DL (ref 2.8–4.4)
GLUCOSE BLD-MCNC: 76 MG/DL (ref 70–99)
HCT VFR BLD AUTO: 47.1 %
HGB BLD-MCNC: 14.6 G/DL
IMM GRANULOCYTES # BLD AUTO: 0.03 X10(3) UL (ref 0–1)
IMM GRANULOCYTES NFR BLD: 0.3 %
LYMPHOCYTES # BLD AUTO: 1.53 X10(3) UL (ref 1–4)
LYMPHOCYTES NFR BLD AUTO: 17.5 %
MCH RBC QN AUTO: 29.7 PG (ref 26–34)
MCHC RBC AUTO-ENTMCNC: 31 G/DL (ref 31–37)
MCV RBC AUTO: 95.9 FL
MONOCYTES # BLD AUTO: 0.78 X10(3) UL (ref 0.1–1)
NEUTROPHILS # BLD AUTO: 5.98 X10 (3) UL (ref 1.5–7.7)
NEUTROPHILS # BLD AUTO: 5.98 X10(3) UL (ref 1.5–7.7)
NEUTROPHILS NFR BLD AUTO: 68.8 %
OSMOLALITY SERPL CALC.SUM OF ELEC: 298 MOSM/KG (ref 275–295)
PLATELET # BLD AUTO: 129 10(3)UL (ref 150–450)
POTASSIUM SERPL-SCNC: 4.4 MMOL/L (ref 3.5–5.1)
PROT SERPL-MCNC: 5.7 G/DL (ref 6.4–8.2)
RBC # BLD AUTO: 4.91 X10(6)UL
SODIUM SERPL-SCNC: 143 MMOL/L (ref 136–145)
WBC # BLD AUTO: 8.7 X10(3) UL (ref 4–11)

## 2022-02-01 PROCEDURE — 85025 COMPLETE CBC W/AUTO DIFF WBC: CPT

## 2022-02-01 PROCEDURE — 80053 COMPREHEN METABOLIC PANEL: CPT

## 2022-02-01 PROCEDURE — 36415 COLL VENOUS BLD VENIPUNCTURE: CPT

## 2022-03-01 ENCOUNTER — NURSE ONLY (OUTPATIENT)
Dept: LAB | Age: 77
End: 2022-03-01
Attending: INTERNAL MEDICINE
Payer: MEDICARE

## 2022-03-01 DIAGNOSIS — B99.9 BLOOD INFECTION: ICD-10-CM

## 2022-03-01 DIAGNOSIS — I10 BENIGN ESSENTIAL HTN: ICD-10-CM

## 2022-03-01 DIAGNOSIS — R77.8 ELEVATED TROPONIN: ICD-10-CM

## 2022-03-01 DIAGNOSIS — G60.0 CHARCOT-MARIE-TOOTH DISEASE: ICD-10-CM

## 2022-03-01 LAB
ALBUMIN SERPL-MCNC: 2.9 G/DL (ref 3.4–5)
ALBUMIN/GLOB SERPL: 1 {RATIO} (ref 1–2)
ALP LIVER SERPL-CCNC: 113 U/L
ALT SERPL-CCNC: 12 U/L
ANION GAP SERPL CALC-SCNC: 5 MMOL/L (ref 0–18)
AST SERPL-CCNC: 12 U/L (ref 15–37)
BASOPHILS # BLD AUTO: 0.05 X10(3) UL (ref 0–0.2)
BASOPHILS NFR BLD AUTO: 0.7 %
BILIRUB SERPL-MCNC: 0.2 MG/DL (ref 0.1–2)
BUN BLD-MCNC: 23 MG/DL (ref 7–18)
CALCIUM BLD-MCNC: 9 MG/DL (ref 8.5–10.1)
CHLORIDE SERPL-SCNC: 106 MMOL/L (ref 98–112)
CO2 SERPL-SCNC: 31 MMOL/L (ref 21–32)
CREAT BLD-MCNC: 0.81 MG/DL
EOSINOPHIL NFR BLD AUTO: 4.9 %
ERYTHROCYTE [DISTWIDTH] IN BLOOD BY AUTOMATED COUNT: 14.2 %
FASTING STATUS PATIENT QL REPORTED: YES
GLOBULIN PLAS-MCNC: 3 G/DL (ref 2.8–4.4)
GLUCOSE BLD-MCNC: 81 MG/DL (ref 70–99)
HGB BLD-MCNC: 14.4 G/DL
IMM GRANULOCYTES # BLD AUTO: 0.02 X10(3) UL (ref 0–1)
IMM GRANULOCYTES NFR BLD: 0.3 %
LYMPHOCYTES # BLD AUTO: 1.47 X10(3) UL (ref 1–4)
LYMPHOCYTES NFR BLD AUTO: 21.8 %
MCH RBC QN AUTO: 30.3 PG (ref 26–34)
MCHC RBC AUTO-ENTMCNC: 31.7 G/DL (ref 31–37)
MCV RBC AUTO: 95.4 FL
MONOCYTES # BLD AUTO: 0.73 X10(3) UL (ref 0.1–1)
MONOCYTES NFR BLD AUTO: 10.8 %
NEUTROPHILS # BLD AUTO: 4.13 X10 (3) UL (ref 1.5–7.7)
NEUTROPHILS # BLD AUTO: 4.13 X10(3) UL (ref 1.5–7.7)
NEUTROPHILS NFR BLD AUTO: 61.5 %
OSMOLALITY SERPL CALC.SUM OF ELEC: 297 MOSM/KG (ref 275–295)
PLATELET # BLD AUTO: 121 10(3)UL (ref 150–450)
POTASSIUM SERPL-SCNC: 5 MMOL/L (ref 3.5–5.1)
PROT SERPL-MCNC: 5.9 G/DL (ref 6.4–8.2)
RBC # BLD AUTO: 4.76 X10(6)UL
SODIUM SERPL-SCNC: 142 MMOL/L (ref 136–145)
WBC # BLD AUTO: 6.7 X10(3) UL (ref 4–11)

## 2022-03-01 PROCEDURE — 80053 COMPREHEN METABOLIC PANEL: CPT

## 2022-03-01 PROCEDURE — 36415 COLL VENOUS BLD VENIPUNCTURE: CPT

## 2022-03-01 PROCEDURE — 85025 COMPLETE CBC W/AUTO DIFF WBC: CPT

## 2022-03-15 NOTE — TELEPHONE ENCOUNTER
Last OV 1/10/20   Last PE 3/22/19  Last REFILL   Medication Quantity Refills Start End   MUPIROCIN 2 % External Ointment 22 g 1 2/2/2021      Last LABS 5/18/21 cmp, lipid, a1c    Future Appointments   Date Time Provider Prashant Solis   9/13/2022  1:00 PM LISA Funes DULY SPALD         Per PROTOCOL?   Not on protocol     Please Advise

## 2022-06-02 ENCOUNTER — NURSE ONLY (OUTPATIENT)
Dept: LAB | Age: 77
End: 2022-06-02
Attending: INTERNAL MEDICINE
Payer: MEDICARE

## 2022-06-02 DIAGNOSIS — E53.8 B12 DEFICIENCY: ICD-10-CM

## 2022-06-02 DIAGNOSIS — E55.9 VITAMIN D DEFICIENCY: ICD-10-CM

## 2022-06-02 DIAGNOSIS — E66.9 DIABETES MELLITUS TYPE 2 IN OBESE (HCC): ICD-10-CM

## 2022-06-02 DIAGNOSIS — G60.0 CHARCOT-MARIE-TOOTH DISEASE: ICD-10-CM

## 2022-06-02 DIAGNOSIS — E78.5 DYSLIPIDEMIA: ICD-10-CM

## 2022-06-02 DIAGNOSIS — I10 HYPERTENSION, UNSPECIFIED TYPE: ICD-10-CM

## 2022-06-02 DIAGNOSIS — E11.69 DIABETES MELLITUS TYPE 2 IN OBESE (HCC): ICD-10-CM

## 2022-06-02 LAB
ALBUMIN SERPL-MCNC: 3.1 G/DL (ref 3.4–5)
ALBUMIN/GLOB SERPL: 1 {RATIO} (ref 1–2)
ALP LIVER SERPL-CCNC: 113 U/L
ALT SERPL-CCNC: 12 U/L
ANION GAP SERPL CALC-SCNC: 5 MMOL/L (ref 0–18)
AST SERPL-CCNC: 11 U/L (ref 15–37)
BASOPHILS # BLD AUTO: 0.05 X10(3) UL (ref 0–0.2)
BASOPHILS NFR BLD AUTO: 0.8 %
BILIRUB SERPL-MCNC: 0.3 MG/DL (ref 0.1–2)
BUN BLD-MCNC: 22 MG/DL (ref 7–18)
CALCIUM BLD-MCNC: 8.6 MG/DL (ref 8.5–10.1)
CHLORIDE SERPL-SCNC: 108 MMOL/L (ref 98–112)
CHOLEST SERPL-MCNC: 108 MG/DL (ref ?–200)
CO2 SERPL-SCNC: 27 MMOL/L (ref 21–32)
CREAT BLD-MCNC: 0.73 MG/DL
EOSINOPHIL # BLD AUTO: 0.31 X10(3) UL (ref 0–0.7)
EOSINOPHIL NFR BLD AUTO: 4.7 %
ERYTHROCYTE [DISTWIDTH] IN BLOOD BY AUTOMATED COUNT: 14.7 %
EST. AVERAGE GLUCOSE BLD GHB EST-MCNC: 128 MG/DL (ref 68–126)
FASTING PATIENT LIPID ANSWER: YES
FASTING STATUS PATIENT QL REPORTED: YES
FOLATE SERPL-MCNC: 18.2 NG/ML (ref 8.7–?)
GLOBULIN PLAS-MCNC: 3.2 G/DL (ref 2.8–4.4)
GLUCOSE BLD-MCNC: 78 MG/DL (ref 70–99)
HBA1C MFR BLD: 6.1 % (ref ?–5.7)
HCT VFR BLD AUTO: 48.6 %
HDLC SERPL-MCNC: 49 MG/DL (ref 40–59)
HGB BLD-MCNC: 15.1 G/DL
IMM GRANULOCYTES # BLD AUTO: 0.02 X10(3) UL (ref 0–1)
IMM GRANULOCYTES NFR BLD: 0.3 %
LDLC SERPL CALC-MCNC: 33 MG/DL (ref ?–100)
LYMPHOCYTES # BLD AUTO: 1.61 X10(3) UL (ref 1–4)
LYMPHOCYTES NFR BLD AUTO: 24.3 %
MCH RBC QN AUTO: 30.3 PG (ref 26–34)
MCHC RBC AUTO-ENTMCNC: 31.1 G/DL (ref 31–37)
MCV RBC AUTO: 97.6 FL
MONOCYTES # BLD AUTO: 0.63 X10(3) UL (ref 0.1–1)
MONOCYTES NFR BLD AUTO: 9.5 %
NEUTROPHILS # BLD AUTO: 4.01 X10 (3) UL (ref 1.5–7.7)
NEUTROPHILS # BLD AUTO: 4.01 X10(3) UL (ref 1.5–7.7)
NEUTROPHILS NFR BLD AUTO: 60.4 %
NONHDLC SERPL-MCNC: 59 MG/DL (ref ?–130)
OSMOLALITY SERPL CALC.SUM OF ELEC: 292 MOSM/KG (ref 275–295)
PLATELET # BLD AUTO: 125 10(3)UL (ref 150–450)
POTASSIUM SERPL-SCNC: 4.2 MMOL/L (ref 3.5–5.1)
PROT SERPL-MCNC: 6.3 G/DL (ref 6.4–8.2)
RBC # BLD AUTO: 4.98 X10(6)UL
SODIUM SERPL-SCNC: 140 MMOL/L (ref 136–145)
TRIGL SERPL-MCNC: 156 MG/DL (ref 30–149)
TSI SER-ACNC: 1.7 MIU/ML (ref 0.36–3.74)
VIT B12 SERPL-MCNC: 230 PG/ML (ref 193–986)
VIT D+METAB SERPL-MCNC: 35 NG/ML (ref 30–100)
VLDLC SERPL CALC-MCNC: 21 MG/DL (ref 0–30)
WBC # BLD AUTO: 6.6 X10(3) UL (ref 4–11)

## 2022-06-02 PROCEDURE — 36415 COLL VENOUS BLD VENIPUNCTURE: CPT

## 2022-06-02 PROCEDURE — 80061 LIPID PANEL: CPT

## 2022-06-02 PROCEDURE — 82746 ASSAY OF FOLIC ACID SERUM: CPT

## 2022-06-02 PROCEDURE — 82306 VITAMIN D 25 HYDROXY: CPT

## 2022-06-02 PROCEDURE — 82607 VITAMIN B-12: CPT

## 2022-06-02 PROCEDURE — 84443 ASSAY THYROID STIM HORMONE: CPT

## 2022-06-02 PROCEDURE — 85025 COMPLETE CBC W/AUTO DIFF WBC: CPT

## 2022-06-02 PROCEDURE — 80053 COMPREHEN METABOLIC PANEL: CPT

## 2022-06-02 PROCEDURE — 83036 HEMOGLOBIN GLYCOSYLATED A1C: CPT

## 2022-09-15 ENCOUNTER — LAB REQUISITION (OUTPATIENT)
Dept: LAB | Facility: HOSPITAL | Age: 77
End: 2022-09-15
Payer: MEDICARE

## 2022-09-15 DIAGNOSIS — E53.8 DEFICIENCY OF OTHER SPECIFIED B GROUP VITAMINS: ICD-10-CM

## 2022-09-15 DIAGNOSIS — D72.829 ELEVATED WHITE BLOOD CELL COUNT, UNSPECIFIED: ICD-10-CM

## 2022-09-15 LAB
ALBUMIN SERPL-MCNC: 2.9 G/DL (ref 3.4–5)
ALBUMIN/GLOB SERPL: 0.9 {RATIO} (ref 1–2)
ALP LIVER SERPL-CCNC: 105 U/L
ALT SERPL-CCNC: 13 U/L
ANION GAP SERPL CALC-SCNC: 6 MMOL/L (ref 0–18)
AST SERPL-CCNC: 15 U/L (ref 15–37)
BASOPHILS # BLD AUTO: 0.05 X10(3) UL (ref 0–0.2)
BASOPHILS NFR BLD AUTO: 0.8 %
BILIRUB SERPL-MCNC: 0.3 MG/DL (ref 0.1–2)
BUN BLD-MCNC: 26 MG/DL (ref 7–18)
CALCIUM BLD-MCNC: 8.7 MG/DL (ref 8.5–10.1)
CHLORIDE SERPL-SCNC: 107 MMOL/L (ref 98–112)
CO2 SERPL-SCNC: 29 MMOL/L (ref 21–32)
CREAT BLD-MCNC: 0.81 MG/DL
EOSINOPHIL # BLD AUTO: 0.32 X10(3) UL (ref 0–0.7)
EOSINOPHIL NFR BLD AUTO: 4.8 %
ERYTHROCYTE [DISTWIDTH] IN BLOOD BY AUTOMATED COUNT: 14.3 %
EST. AVERAGE GLUCOSE BLD GHB EST-MCNC: 131 MG/DL (ref 68–126)
GFR SERPLBLD BASED ON 1.73 SQ M-ARVRAT: 74 ML/MIN/1.73M2 (ref 60–?)
GLOBULIN PLAS-MCNC: 3.3 G/DL (ref 2.8–4.4)
GLUCOSE BLD-MCNC: 55 MG/DL (ref 70–99)
HBA1C MFR BLD: 6.2 % (ref ?–5.7)
HCT VFR BLD AUTO: 47.3 %
HGB BLD-MCNC: 14.9 G/DL
IMM GRANULOCYTES # BLD AUTO: 0.02 X10(3) UL (ref 0–1)
IMM GRANULOCYTES NFR BLD: 0.3 %
LYMPHOCYTES # BLD AUTO: 1.65 X10(3) UL (ref 1–4)
LYMPHOCYTES NFR BLD AUTO: 24.8 %
MCH RBC QN AUTO: 30.7 PG (ref 26–34)
MCHC RBC AUTO-ENTMCNC: 31.5 G/DL (ref 31–37)
MCV RBC AUTO: 97.3 FL
MONOCYTES # BLD AUTO: 0.65 X10(3) UL (ref 0.1–1)
MONOCYTES NFR BLD AUTO: 9.8 %
NEUTROPHILS # BLD AUTO: 3.96 X10 (3) UL (ref 1.5–7.7)
NEUTROPHILS # BLD AUTO: 3.96 X10(3) UL (ref 1.5–7.7)
NEUTROPHILS NFR BLD AUTO: 59.5 %
OSMOLALITY SERPL CALC.SUM OF ELEC: 296 MOSM/KG (ref 275–295)
PLATELET # BLD AUTO: 106 10(3)UL (ref 150–450)
POTASSIUM SERPL-SCNC: 4 MMOL/L (ref 3.5–5.1)
PROT SERPL-MCNC: 6.2 G/DL (ref 6.4–8.2)
RBC # BLD AUTO: 4.86 X10(6)UL
SODIUM SERPL-SCNC: 142 MMOL/L (ref 136–145)
WBC # BLD AUTO: 6.7 X10(3) UL (ref 4–11)

## 2022-09-15 PROCEDURE — 80053 COMPREHEN METABOLIC PANEL: CPT | Performed by: INTERNAL MEDICINE

## 2022-09-15 PROCEDURE — 83036 HEMOGLOBIN GLYCOSYLATED A1C: CPT | Performed by: INTERNAL MEDICINE

## 2022-09-15 PROCEDURE — 85025 COMPLETE CBC W/AUTO DIFF WBC: CPT | Performed by: INTERNAL MEDICINE

## 2022-09-20 ENCOUNTER — LAB REQUISITION (OUTPATIENT)
Dept: LAB | Facility: HOSPITAL | Age: 77
End: 2022-09-20

## 2022-09-20 DIAGNOSIS — D69.6 THROMBOCYTOPENIA, UNSPECIFIED (HCC): ICD-10-CM

## 2022-09-20 LAB
ALBUMIN SERPL-MCNC: 2.9 G/DL (ref 3.4–5)
ALBUMIN/GLOB SERPL: 0.9 {RATIO} (ref 1–2)
ALP LIVER SERPL-CCNC: 115 U/L
ALT SERPL-CCNC: 13 U/L
ANION GAP SERPL CALC-SCNC: 4 MMOL/L (ref 0–18)
AST SERPL-CCNC: 9 U/L (ref 15–37)
BASOPHILS # BLD AUTO: 0.06 X10(3) UL (ref 0–0.2)
BASOPHILS NFR BLD AUTO: 0.8 %
BILIRUB SERPL-MCNC: 0.3 MG/DL (ref 0.1–2)
BUN BLD-MCNC: 29 MG/DL (ref 7–18)
CALCIUM BLD-MCNC: 9.1 MG/DL (ref 8.5–10.1)
CHLORIDE SERPL-SCNC: 110 MMOL/L (ref 98–112)
CO2 SERPL-SCNC: 29 MMOL/L (ref 21–32)
CREAT BLD-MCNC: 0.74 MG/DL
EOSINOPHIL # BLD AUTO: 0.29 X10(3) UL (ref 0–0.7)
EOSINOPHIL NFR BLD AUTO: 4.1 %
ERYTHROCYTE [DISTWIDTH] IN BLOOD BY AUTOMATED COUNT: 14.6 %
EST. AVERAGE GLUCOSE BLD GHB EST-MCNC: 131 MG/DL (ref 68–126)
GFR SERPLBLD BASED ON 1.73 SQ M-ARVRAT: 83 ML/MIN/1.73M2 (ref 60–?)
GLOBULIN PLAS-MCNC: 3.3 G/DL (ref 2.8–4.4)
GLUCOSE BLD-MCNC: 76 MG/DL (ref 70–99)
HBA1C MFR BLD: 6.2 % (ref ?–5.7)
HCT VFR BLD AUTO: 48.1 %
HGB BLD-MCNC: 14.9 G/DL
IMM GRANULOCYTES # BLD AUTO: 0.03 X10(3) UL (ref 0–1)
IMM GRANULOCYTES NFR BLD: 0.4 %
LYMPHOCYTES # BLD AUTO: 1.71 X10(3) UL (ref 1–4)
LYMPHOCYTES NFR BLD AUTO: 23.9 %
MCH RBC QN AUTO: 30.8 PG (ref 26–34)
MCHC RBC AUTO-ENTMCNC: 31 G/DL (ref 31–37)
MCV RBC AUTO: 99.4 FL
MONOCYTES # BLD AUTO: 0.65 X10(3) UL (ref 0.1–1)
MONOCYTES NFR BLD AUTO: 9.1 %
NEUTROPHILS # BLD AUTO: 4.41 X10 (3) UL (ref 1.5–7.7)
NEUTROPHILS # BLD AUTO: 4.41 X10(3) UL (ref 1.5–7.7)
NEUTROPHILS NFR BLD AUTO: 61.7 %
OSMOLALITY SERPL CALC.SUM OF ELEC: 301 MOSM/KG (ref 275–295)
PLATELET # BLD AUTO: 122 10(3)UL (ref 150–450)
POTASSIUM SERPL-SCNC: 4.1 MMOL/L (ref 3.5–5.1)
PROT SERPL-MCNC: 6.2 G/DL (ref 6.4–8.2)
RBC # BLD AUTO: 4.84 X10(6)UL
SODIUM SERPL-SCNC: 143 MMOL/L (ref 136–145)
WBC # BLD AUTO: 7.2 X10(3) UL (ref 4–11)

## 2022-09-20 PROCEDURE — 85025 COMPLETE CBC W/AUTO DIFF WBC: CPT | Performed by: INTERNAL MEDICINE

## 2022-09-20 PROCEDURE — 80053 COMPREHEN METABOLIC PANEL: CPT | Performed by: INTERNAL MEDICINE

## 2022-09-20 PROCEDURE — 83036 HEMOGLOBIN GLYCOSYLATED A1C: CPT | Performed by: INTERNAL MEDICINE

## 2022-10-18 ENCOUNTER — LAB REQUISITION (OUTPATIENT)
Dept: LAB | Facility: HOSPITAL | Age: 77
End: 2022-10-18
Payer: MEDICARE

## 2022-10-18 DIAGNOSIS — D72.829 ELEVATED WHITE BLOOD CELL COUNT, UNSPECIFIED: ICD-10-CM

## 2022-10-18 LAB
ALBUMIN SERPL-MCNC: 2.8 G/DL (ref 3.4–5)
ALBUMIN/GLOB SERPL: 0.9 {RATIO} (ref 1–2)
ALP LIVER SERPL-CCNC: 114 U/L
ALT SERPL-CCNC: 15 U/L
ANION GAP SERPL CALC-SCNC: 3 MMOL/L (ref 0–18)
AST SERPL-CCNC: 15 U/L (ref 15–37)
BASOPHILS # BLD AUTO: 0.06 X10(3) UL (ref 0–0.2)
BASOPHILS NFR BLD AUTO: 0.8 %
BILIRUB SERPL-MCNC: 0.2 MG/DL (ref 0.1–2)
BUN BLD-MCNC: 27 MG/DL (ref 7–18)
CALCIUM BLD-MCNC: 8.6 MG/DL (ref 8.5–10.1)
CHLORIDE SERPL-SCNC: 108 MMOL/L (ref 98–112)
CO2 SERPL-SCNC: 29 MMOL/L (ref 21–32)
CREAT BLD-MCNC: 0.89 MG/DL
EOSINOPHIL # BLD AUTO: 0.29 X10(3) UL (ref 0–0.7)
EOSINOPHIL NFR BLD AUTO: 3.9 %
ERYTHROCYTE [DISTWIDTH] IN BLOOD BY AUTOMATED COUNT: 14.5 %
GFR SERPLBLD BASED ON 1.73 SQ M-ARVRAT: 66 ML/MIN/1.73M2 (ref 60–?)
GLOBULIN PLAS-MCNC: 3.2 G/DL (ref 2.8–4.4)
GLUCOSE BLD-MCNC: 79 MG/DL (ref 70–99)
HCT VFR BLD AUTO: 47.5 %
HGB BLD-MCNC: 14.9 G/DL
IMM GRANULOCYTES # BLD AUTO: 0.04 X10(3) UL (ref 0–1)
IMM GRANULOCYTES NFR BLD: 0.5 %
LYMPHOCYTES # BLD AUTO: 1.76 X10(3) UL (ref 1–4)
LYMPHOCYTES NFR BLD AUTO: 23.9 %
MCH RBC QN AUTO: 30.6 PG (ref 26–34)
MCHC RBC AUTO-ENTMCNC: 31.4 G/DL (ref 31–37)
MCV RBC AUTO: 97.5 FL
MONOCYTES # BLD AUTO: 0.75 X10(3) UL (ref 0.1–1)
MONOCYTES NFR BLD AUTO: 10.2 %
NEUTROPHILS # BLD AUTO: 4.47 X10 (3) UL (ref 1.5–7.7)
NEUTROPHILS # BLD AUTO: 4.47 X10(3) UL (ref 1.5–7.7)
NEUTROPHILS NFR BLD AUTO: 60.7 %
OSMOLALITY SERPL CALC.SUM OF ELEC: 294 MOSM/KG (ref 275–295)
PLATELET # BLD AUTO: 119 10(3)UL (ref 150–450)
POTASSIUM SERPL-SCNC: 4.8 MMOL/L (ref 3.5–5.1)
PROT SERPL-MCNC: 6 G/DL (ref 6.4–8.2)
RBC # BLD AUTO: 4.87 X10(6)UL
SODIUM SERPL-SCNC: 140 MMOL/L (ref 136–145)
WBC # BLD AUTO: 7.4 X10(3) UL (ref 4–11)

## 2022-10-18 PROCEDURE — 80053 COMPREHEN METABOLIC PANEL: CPT | Performed by: INTERNAL MEDICINE

## 2022-10-18 PROCEDURE — 85025 COMPLETE CBC W/AUTO DIFF WBC: CPT | Performed by: INTERNAL MEDICINE

## 2022-11-17 ENCOUNTER — LAB REQUISITION (OUTPATIENT)
Dept: LAB | Facility: HOSPITAL | Age: 77
End: 2022-11-17
Payer: MEDICARE

## 2022-11-17 DIAGNOSIS — I10 ESSENTIAL (PRIMARY) HYPERTENSION: ICD-10-CM

## 2022-11-17 LAB
ALBUMIN SERPL-MCNC: 2.9 G/DL (ref 3.4–5)
ALBUMIN/GLOB SERPL: 0.9 {RATIO} (ref 1–2)
ALP LIVER SERPL-CCNC: 102 U/L
ALT SERPL-CCNC: 14 U/L
ANION GAP SERPL CALC-SCNC: 5 MMOL/L (ref 0–18)
AST SERPL-CCNC: 16 U/L (ref 15–37)
BASOPHILS # BLD AUTO: 0.06 X10(3) UL (ref 0–0.2)
BASOPHILS NFR BLD AUTO: 0.7 %
BILIRUB SERPL-MCNC: 0.3 MG/DL (ref 0.1–2)
BUN BLD-MCNC: 33 MG/DL (ref 7–18)
CALCIUM BLD-MCNC: 9.2 MG/DL (ref 8.5–10.1)
CHLORIDE SERPL-SCNC: 107 MMOL/L (ref 98–112)
CO2 SERPL-SCNC: 29 MMOL/L (ref 21–32)
CREAT BLD-MCNC: 0.91 MG/DL
EOSINOPHIL # BLD AUTO: 0.32 X10(3) UL (ref 0–0.7)
EOSINOPHIL NFR BLD AUTO: 3.8 %
ERYTHROCYTE [DISTWIDTH] IN BLOOD BY AUTOMATED COUNT: 14.6 %
GFR SERPLBLD BASED ON 1.73 SQ M-ARVRAT: 65 ML/MIN/1.73M2 (ref 60–?)
GLOBULIN PLAS-MCNC: 3.3 G/DL (ref 2.8–4.4)
GLUCOSE BLD-MCNC: 95 MG/DL (ref 70–99)
HCT VFR BLD AUTO: 46.2 %
HGB BLD-MCNC: 14.5 G/DL
IMM GRANULOCYTES # BLD AUTO: 0.04 X10(3) UL (ref 0–1)
IMM GRANULOCYTES NFR BLD: 0.5 %
LYMPHOCYTES # BLD AUTO: 1.35 X10(3) UL (ref 1–4)
LYMPHOCYTES NFR BLD AUTO: 16.2 %
MCH RBC QN AUTO: 30.4 PG (ref 26–34)
MCHC RBC AUTO-ENTMCNC: 31.4 G/DL (ref 31–37)
MCV RBC AUTO: 96.9 FL
MONOCYTES # BLD AUTO: 0.71 X10(3) UL (ref 0.1–1)
MONOCYTES NFR BLD AUTO: 8.5 %
NEUTROPHILS # BLD AUTO: 5.86 X10 (3) UL (ref 1.5–7.7)
NEUTROPHILS # BLD AUTO: 5.86 X10(3) UL (ref 1.5–7.7)
NEUTROPHILS NFR BLD AUTO: 70.3 %
OSMOLALITY SERPL CALC.SUM OF ELEC: 299 MOSM/KG (ref 275–295)
PLATELET # BLD AUTO: 127 10(3)UL (ref 150–450)
POTASSIUM SERPL-SCNC: 4.5 MMOL/L (ref 3.5–5.1)
PROT SERPL-MCNC: 6.2 G/DL (ref 6.4–8.2)
RBC # BLD AUTO: 4.77 X10(6)UL
SODIUM SERPL-SCNC: 141 MMOL/L (ref 136–145)
WBC # BLD AUTO: 8.3 X10(3) UL (ref 4–11)

## 2022-11-17 PROCEDURE — 80053 COMPREHEN METABOLIC PANEL: CPT | Performed by: INTERNAL MEDICINE

## 2022-11-17 PROCEDURE — 85025 COMPLETE CBC W/AUTO DIFF WBC: CPT | Performed by: INTERNAL MEDICINE

## 2022-12-28 PROCEDURE — 87088 URINE BACTERIA CULTURE: CPT

## 2022-12-28 PROCEDURE — 87186 SC STD MICRODIL/AGAR DIL: CPT

## 2022-12-28 PROCEDURE — 87086 URINE CULTURE/COLONY COUNT: CPT

## 2022-12-29 ENCOUNTER — LAB REQUISITION (OUTPATIENT)
Dept: LAB | Facility: HOSPITAL | Age: 77
End: 2022-12-29
Payer: MEDICARE

## 2022-12-29 DIAGNOSIS — I10 ESSENTIAL (PRIMARY) HYPERTENSION: ICD-10-CM

## 2022-12-29 LAB
ALBUMIN SERPL-MCNC: 3.5 G/DL (ref 3.4–5)
ALBUMIN/GLOB SERPL: 0.9 {RATIO} (ref 1–2)
ALP LIVER SERPL-CCNC: 115 U/L
ALT SERPL-CCNC: 16 U/L
ANION GAP SERPL CALC-SCNC: 5 MMOL/L (ref 0–18)
AST SERPL-CCNC: 26 U/L (ref 15–37)
BASOPHILS # BLD AUTO: 0.07 X10(3) UL (ref 0–0.2)
BASOPHILS NFR BLD AUTO: 0.9 %
BILIRUB SERPL-MCNC: 0.3 MG/DL (ref 0.1–2)
BILIRUB UR QL STRIP.AUTO: NEGATIVE
BUN BLD-MCNC: 30 MG/DL (ref 7–18)
CALCIUM BLD-MCNC: 9.7 MG/DL (ref 8.5–10.1)
CHLORIDE SERPL-SCNC: 106 MMOL/L (ref 98–112)
CO2 SERPL-SCNC: 28 MMOL/L (ref 21–32)
COLOR UR AUTO: YELLOW
CREAT BLD-MCNC: 0.98 MG/DL
EOSINOPHIL # BLD AUTO: 0.36 X10(3) UL (ref 0–0.7)
EOSINOPHIL NFR BLD AUTO: 4.4 %
ERYTHROCYTE [DISTWIDTH] IN BLOOD BY AUTOMATED COUNT: 14.7 %
GFR SERPLBLD BASED ON 1.73 SQ M-ARVRAT: 59 ML/MIN/1.73M2 (ref 60–?)
GLOBULIN PLAS-MCNC: 4.1 G/DL (ref 2.8–4.4)
GLUCOSE BLD-MCNC: 97 MG/DL (ref 70–99)
GLUCOSE UR STRIP.AUTO-MCNC: NEGATIVE MG/DL
HCT VFR BLD AUTO: 53.5 %
HGB BLD-MCNC: 16.7 G/DL
IMM GRANULOCYTES # BLD AUTO: 0.04 X10(3) UL (ref 0–1)
IMM GRANULOCYTES NFR BLD: 0.5 %
KETONES UR STRIP.AUTO-MCNC: NEGATIVE MG/DL
LYMPHOCYTES # BLD AUTO: 2.32 X10(3) UL (ref 1–4)
LYMPHOCYTES NFR BLD AUTO: 28.6 %
MCH RBC QN AUTO: 30.5 PG (ref 26–34)
MCHC RBC AUTO-ENTMCNC: 31.2 G/DL (ref 31–37)
MCV RBC AUTO: 97.6 FL
MONOCYTES # BLD AUTO: 0.76 X10(3) UL (ref 0.1–1)
MONOCYTES NFR BLD AUTO: 9.4 %
NEUTROPHILS # BLD AUTO: 4.57 X10 (3) UL (ref 1.5–7.7)
NEUTROPHILS # BLD AUTO: 4.57 X10(3) UL (ref 1.5–7.7)
NEUTROPHILS NFR BLD AUTO: 56.2 %
NITRITE UR QL STRIP.AUTO: NEGATIVE
OSMOLALITY SERPL CALC.SUM OF ELEC: 294 MOSM/KG (ref 275–295)
PH UR STRIP.AUTO: 6 [PH] (ref 5–8)
PLATELET # BLD AUTO: 130 10(3)UL (ref 150–450)
POTASSIUM SERPL-SCNC: 4.8 MMOL/L (ref 3.5–5.1)
PROT SERPL-MCNC: 7.6 G/DL (ref 6.4–8.2)
PROT UR STRIP.AUTO-MCNC: 30 MG/DL
RBC # BLD AUTO: 5.48 X10(6)UL
SODIUM SERPL-SCNC: 139 MMOL/L (ref 136–145)
SP GR UR STRIP.AUTO: 1.02 (ref 1–1.03)
UROBILINOGEN UR STRIP.AUTO-MCNC: <2 MG/DL
WBC # BLD AUTO: 8.1 X10(3) UL (ref 4–11)
WBC #/AREA URNS AUTO: >50 /HPF
WBC CLUMPS UR QL AUTO: PRESENT /HPF

## 2022-12-29 PROCEDURE — 85025 COMPLETE CBC W/AUTO DIFF WBC: CPT

## 2022-12-29 PROCEDURE — 80053 COMPREHEN METABOLIC PANEL: CPT

## 2022-12-29 PROCEDURE — 81001 URINALYSIS AUTO W/SCOPE: CPT

## 2023-01-31 ENCOUNTER — LAB REQUISITION (OUTPATIENT)
Dept: LAB | Facility: HOSPITAL | Age: 78
End: 2023-01-31
Payer: MEDICARE

## 2023-01-31 DIAGNOSIS — D72.829 ELEVATED WHITE BLOOD CELL COUNT, UNSPECIFIED: ICD-10-CM

## 2023-01-31 LAB
ALBUMIN SERPL-MCNC: 2.9 G/DL (ref 3.4–5)
ALBUMIN/GLOB SERPL: 0.9 {RATIO} (ref 1–2)
ALP LIVER SERPL-CCNC: 102 U/L
ALT SERPL-CCNC: 13 U/L
ANION GAP SERPL CALC-SCNC: 2 MMOL/L (ref 0–18)
AST SERPL-CCNC: 19 U/L (ref 15–37)
BILIRUB SERPL-MCNC: 0.3 MG/DL (ref 0.1–2)
BUN BLD-MCNC: 25 MG/DL (ref 7–18)
CALCIUM BLD-MCNC: 8.8 MG/DL (ref 8.5–10.1)
CHLORIDE SERPL-SCNC: 108 MMOL/L (ref 98–112)
CO2 SERPL-SCNC: 30 MMOL/L (ref 21–32)
CREAT BLD-MCNC: 0.76 MG/DL
EST. AVERAGE GLUCOSE BLD GHB EST-MCNC: 134 MG/DL (ref 68–126)
GFR SERPLBLD BASED ON 1.73 SQ M-ARVRAT: 80 ML/MIN/1.73M2 (ref 60–?)
GLOBULIN PLAS-MCNC: 3.2 G/DL (ref 2.8–4.4)
GLUCOSE BLD-MCNC: 77 MG/DL (ref 70–99)
HBA1C MFR BLD: 6.3 % (ref ?–5.7)
OSMOLALITY SERPL CALC.SUM OF ELEC: 293 MOSM/KG (ref 275–295)
POTASSIUM SERPL-SCNC: 4.3 MMOL/L (ref 3.5–5.1)
PROT SERPL-MCNC: 6.1 G/DL (ref 6.4–8.2)
SODIUM SERPL-SCNC: 140 MMOL/L (ref 136–145)

## 2023-01-31 PROCEDURE — 83036 HEMOGLOBIN GLYCOSYLATED A1C: CPT

## 2023-01-31 PROCEDURE — 80053 COMPREHEN METABOLIC PANEL: CPT

## 2023-02-09 ENCOUNTER — LAB REQUISITION (OUTPATIENT)
Dept: LAB | Facility: HOSPITAL | Age: 78
End: 2023-02-09
Payer: MEDICARE

## 2023-02-09 DIAGNOSIS — D72.829 ELEVATED WHITE BLOOD CELL COUNT, UNSPECIFIED: ICD-10-CM

## 2023-02-09 LAB
ALBUMIN SERPL-MCNC: 3.1 G/DL (ref 3.4–5)
ALBUMIN/GLOB SERPL: 1 {RATIO} (ref 1–2)
ALP LIVER SERPL-CCNC: 94 U/L
ALT SERPL-CCNC: 12 U/L
ANION GAP SERPL CALC-SCNC: 3 MMOL/L (ref 0–18)
AST SERPL-CCNC: 17 U/L (ref 15–37)
BASOPHILS # BLD AUTO: 0.08 X10(3) UL (ref 0–0.2)
BASOPHILS NFR BLD AUTO: 1.2 %
BILIRUB SERPL-MCNC: 0.4 MG/DL (ref 0.1–2)
BUN BLD-MCNC: 24 MG/DL (ref 7–18)
CALCIUM BLD-MCNC: 9.1 MG/DL (ref 8.5–10.1)
CHLORIDE SERPL-SCNC: 108 MMOL/L (ref 98–112)
CO2 SERPL-SCNC: 31 MMOL/L (ref 21–32)
CREAT BLD-MCNC: 0.79 MG/DL
EOSINOPHIL # BLD AUTO: 0.38 X10(3) UL (ref 0–0.7)
EOSINOPHIL NFR BLD AUTO: 5.9 %
ERYTHROCYTE [DISTWIDTH] IN BLOOD BY AUTOMATED COUNT: 14 %
EST. AVERAGE GLUCOSE BLD GHB EST-MCNC: 134 MG/DL (ref 68–126)
GFR SERPLBLD BASED ON 1.73 SQ M-ARVRAT: 76 ML/MIN/1.73M2 (ref 60–?)
GLOBULIN PLAS-MCNC: 3.2 G/DL (ref 2.8–4.4)
GLUCOSE BLD-MCNC: 79 MG/DL (ref 70–99)
HBA1C MFR BLD: 6.3 % (ref ?–5.7)
HCT VFR BLD AUTO: 46.2 %
HGB BLD-MCNC: 14.7 G/DL
IMM GRANULOCYTES # BLD AUTO: 0.02 X10(3) UL (ref 0–1)
IMM GRANULOCYTES NFR BLD: 0.3 %
LYMPHOCYTES # BLD AUTO: 1.58 X10(3) UL (ref 1–4)
LYMPHOCYTES NFR BLD AUTO: 24.3 %
MCH RBC QN AUTO: 30.6 PG (ref 26–34)
MCHC RBC AUTO-ENTMCNC: 31.8 G/DL (ref 31–37)
MCV RBC AUTO: 96.3 FL
MONOCYTES # BLD AUTO: 0.76 X10(3) UL (ref 0.1–1)
MONOCYTES NFR BLD AUTO: 11.7 %
NEUTROPHILS # BLD AUTO: 3.67 X10 (3) UL (ref 1.5–7.7)
NEUTROPHILS # BLD AUTO: 3.67 X10(3) UL (ref 1.5–7.7)
NEUTROPHILS NFR BLD AUTO: 56.6 %
OSMOLALITY SERPL CALC.SUM OF ELEC: 297 MOSM/KG (ref 275–295)
PLATELET # BLD AUTO: 120 10(3)UL (ref 150–450)
POTASSIUM SERPL-SCNC: 4.2 MMOL/L (ref 3.5–5.1)
PROT SERPL-MCNC: 6.3 G/DL (ref 6.4–8.2)
RBC # BLD AUTO: 4.8 X10(6)UL
SODIUM SERPL-SCNC: 142 MMOL/L (ref 136–145)
WBC # BLD AUTO: 6.5 X10(3) UL (ref 4–11)

## 2023-02-09 PROCEDURE — 83036 HEMOGLOBIN GLYCOSYLATED A1C: CPT

## 2023-02-09 PROCEDURE — 80053 COMPREHEN METABOLIC PANEL: CPT

## 2023-02-09 PROCEDURE — 85025 COMPLETE CBC W/AUTO DIFF WBC: CPT

## 2023-02-16 ENCOUNTER — HOSPITAL ENCOUNTER (INPATIENT)
Facility: HOSPITAL | Age: 78
LOS: 5 days | Discharge: NURSING, REHAB OR LONG-TERM CARE | End: 2023-02-21
Attending: STUDENT IN AN ORGANIZED HEALTH CARE EDUCATION/TRAINING PROGRAM | Admitting: HOSPITALIST
Payer: MEDICARE

## 2023-02-16 ENCOUNTER — APPOINTMENT (OUTPATIENT)
Dept: GENERAL RADIOLOGY | Facility: HOSPITAL | Age: 78
End: 2023-02-16
Attending: STUDENT IN AN ORGANIZED HEALTH CARE EDUCATION/TRAINING PROGRAM
Payer: MEDICARE

## 2023-02-16 ENCOUNTER — APPOINTMENT (OUTPATIENT)
Dept: CT IMAGING | Facility: HOSPITAL | Age: 78
End: 2023-02-16
Attending: STUDENT IN AN ORGANIZED HEALTH CARE EDUCATION/TRAINING PROGRAM
Payer: MEDICARE

## 2023-02-16 DIAGNOSIS — R09.02 HYPOXIA: Primary | ICD-10-CM

## 2023-02-16 DIAGNOSIS — I10 HYPERTENSION, UNSPECIFIED TYPE: ICD-10-CM

## 2023-02-16 DIAGNOSIS — E66.9 DIABETES MELLITUS TYPE 2 IN OBESE (HCC): ICD-10-CM

## 2023-02-16 DIAGNOSIS — J98.01 ACUTE BRONCHOSPASM: ICD-10-CM

## 2023-02-16 DIAGNOSIS — E11.69 DIABETES MELLITUS TYPE 2 IN OBESE (HCC): ICD-10-CM

## 2023-02-16 DIAGNOSIS — J18.9 COMMUNITY ACQUIRED PNEUMONIA, UNSPECIFIED LATERALITY: ICD-10-CM

## 2023-02-16 LAB
ADENOVIRUS PCR:: NOT DETECTED
ALBUMIN SERPL-MCNC: 4.1 G/DL (ref 3.4–5)
ALBUMIN/GLOB SERPL: 0.9 {RATIO} (ref 1–2)
ALP LIVER SERPL-CCNC: 137 U/L
ALT SERPL-CCNC: 17 U/L
ANION GAP SERPL CALC-SCNC: 5 MMOL/L (ref 0–18)
APTT PPP: 26.6 SECONDS (ref 23.3–35.6)
AST SERPL-CCNC: 19 U/L (ref 15–37)
ATRIAL RATE: 87 BPM
B PARAPERT DNA SPEC QL NAA+PROBE: NOT DETECTED
B PERT DNA SPEC QL NAA+PROBE: NOT DETECTED
BASOPHILS # BLD AUTO: 0.06 X10(3) UL (ref 0–0.2)
BASOPHILS NFR BLD AUTO: 0.8 %
BILIRUB SERPL-MCNC: 0.4 MG/DL (ref 0.1–2)
BUN BLD-MCNC: 26 MG/DL (ref 7–18)
C PNEUM DNA SPEC QL NAA+PROBE: NOT DETECTED
CALCIUM BLD-MCNC: 9.5 MG/DL (ref 8.5–10.1)
CHLORIDE SERPL-SCNC: 107 MMOL/L (ref 98–112)
CO2 SERPL-SCNC: 27 MMOL/L (ref 21–32)
CORONAVIRUS 229E PCR:: NOT DETECTED
CORONAVIRUS HKU1 PCR:: NOT DETECTED
CORONAVIRUS NL63 PCR:: NOT DETECTED
CORONAVIRUS OC43 PCR:: NOT DETECTED
CREAT BLD-MCNC: 1.06 MG/DL
EOSINOPHIL # BLD AUTO: 0.26 X10(3) UL (ref 0–0.7)
EOSINOPHIL NFR BLD AUTO: 3.3 %
ERYTHROCYTE [DISTWIDTH] IN BLOOD BY AUTOMATED COUNT: 14.4 %
FLUAV + FLUBV RNA SPEC NAA+PROBE: NEGATIVE
FLUAV + FLUBV RNA SPEC NAA+PROBE: NEGATIVE
FLUAV RNA SPEC QL NAA+PROBE: NOT DETECTED
FLUBV RNA SPEC QL NAA+PROBE: NOT DETECTED
GFR SERPLBLD BASED ON 1.73 SQ M-ARVRAT: 53 ML/MIN/1.73M2 (ref 60–?)
GLOBULIN PLAS-MCNC: 4.6 G/DL (ref 2.8–4.4)
GLUCOSE BLD-MCNC: 119 MG/DL (ref 70–99)
GLUCOSE BLD-MCNC: 131 MG/DL (ref 70–99)
GLUCOSE BLD-MCNC: 141 MG/DL (ref 70–99)
GLUCOSE BLD-MCNC: 173 MG/DL (ref 70–99)
HCT VFR BLD AUTO: 55.4 %
HGB BLD-MCNC: 17.5 G/DL
IMM GRANULOCYTES # BLD AUTO: 0.03 X10(3) UL (ref 0–1)
IMM GRANULOCYTES NFR BLD: 0.4 %
INR BLD: 1.03 (ref 0.85–1.16)
LACTATE SERPL-SCNC: 1.4 MMOL/L (ref 0.4–2)
LYMPHOCYTES # BLD AUTO: 1.28 X10(3) UL (ref 1–4)
LYMPHOCYTES NFR BLD AUTO: 16.4 %
MCH RBC QN AUTO: 30.4 PG (ref 26–34)
MCHC RBC AUTO-ENTMCNC: 31.6 G/DL (ref 31–37)
MCV RBC AUTO: 96.3 FL
METAPNEUMOVIRUS PCR:: NOT DETECTED
MONOCYTES # BLD AUTO: 0.51 X10(3) UL (ref 0.1–1)
MONOCYTES NFR BLD AUTO: 6.5 %
MRSA DNA SPEC QL NAA+PROBE: NEGATIVE
MYCOPLASMA PNEUMONIA PCR:: NOT DETECTED
NEUTROPHILS # BLD AUTO: 5.67 X10 (3) UL (ref 1.5–7.7)
NEUTROPHILS # BLD AUTO: 5.67 X10(3) UL (ref 1.5–7.7)
NEUTROPHILS NFR BLD AUTO: 72.6 %
NT-PROBNP SERPL-MCNC: 80 PG/ML (ref ?–450)
OSMOLALITY SERPL CALC.SUM OF ELEC: 295 MOSM/KG (ref 275–295)
PARAINFLUENZA 1 PCR:: NOT DETECTED
PARAINFLUENZA 2 PCR:: NOT DETECTED
PARAINFLUENZA 3 PCR:: NOT DETECTED
PARAINFLUENZA 4 PCR:: NOT DETECTED
PLATELET # BLD AUTO: 143 10(3)UL (ref 150–450)
POTASSIUM SERPL-SCNC: 4.2 MMOL/L (ref 3.5–5.1)
PROCALCITONIN SERPL-MCNC: <0.05 NG/ML (ref ?–0.16)
PROT SERPL-MCNC: 8.7 G/DL (ref 6.4–8.2)
PROTHROMBIN TIME: 13.5 SECONDS (ref 11.6–14.8)
Q-T INTERVAL: 352 MS
QRS DURATION: 66 MS
QTC CALCULATION (BEZET): 408 MS
R AXIS: 2 DEGREES
RBC # BLD AUTO: 5.75 X10(6)UL
RHINOVIRUS/ENTERO PCR:: NOT DETECTED
RSV RNA SPEC NAA+PROBE: NEGATIVE
RSV RNA SPEC QL NAA+PROBE: NOT DETECTED
SARS-COV-2 RNA NPH QL NAA+NON-PROBE: NOT DETECTED
SARS-COV-2 RNA RESP QL NAA+PROBE: NOT DETECTED
SODIUM SERPL-SCNC: 139 MMOL/L (ref 136–145)
T AXIS: 24 DEGREES
TROPONIN I HIGH SENSITIVITY: 21 NG/L
VENTRICULAR RATE: 81 BPM
WBC # BLD AUTO: 7.8 X10(3) UL (ref 4–11)

## 2023-02-16 PROCEDURE — 71045 X-RAY EXAM CHEST 1 VIEW: CPT | Performed by: STUDENT IN AN ORGANIZED HEALTH CARE EDUCATION/TRAINING PROGRAM

## 2023-02-16 PROCEDURE — 71275 CT ANGIOGRAPHY CHEST: CPT | Performed by: STUDENT IN AN ORGANIZED HEALTH CARE EDUCATION/TRAINING PROGRAM

## 2023-02-16 PROCEDURE — 99223 1ST HOSP IP/OBS HIGH 75: CPT | Performed by: HOSPITALIST

## 2023-02-16 PROCEDURE — 73522 X-RAY EXAM HIPS BI 3-4 VIEWS: CPT | Performed by: STUDENT IN AN ORGANIZED HEALTH CARE EDUCATION/TRAINING PROGRAM

## 2023-02-16 RX ORDER — BENZONATATE 100 MG/1
100 CAPSULE ORAL 3 TIMES DAILY PRN
Status: DISCONTINUED | OUTPATIENT
Start: 2023-02-16 | End: 2023-02-17

## 2023-02-16 RX ORDER — GUAIFENESIN 600 MG/1
600 TABLET, EXTENDED RELEASE ORAL 2 TIMES DAILY
Status: DISCONTINUED | OUTPATIENT
Start: 2023-02-16 | End: 2023-02-21

## 2023-02-16 RX ORDER — SODIUM CHLORIDE 9 MG/ML
INJECTION, SOLUTION INTRAVENOUS CONTINUOUS
Status: DISCONTINUED | OUTPATIENT
Start: 2023-02-16 | End: 2023-02-17

## 2023-02-16 RX ORDER — METHYLPREDNISOLONE SODIUM SUCCINATE 40 MG/ML
40 INJECTION, POWDER, LYOPHILIZED, FOR SOLUTION INTRAMUSCULAR; INTRAVENOUS EVERY 6 HOURS
Status: DISCONTINUED | OUTPATIENT
Start: 2023-02-17 | End: 2023-02-18

## 2023-02-16 RX ORDER — CYANOCOBALAMIN 1000 UG/ML
1000 INJECTION, SOLUTION INTRAMUSCULAR; SUBCUTANEOUS
COMMUNITY
Start: 2023-01-04

## 2023-02-16 RX ORDER — ACETAMINOPHEN 500 MG
1000 TABLET ORAL EVERY 6 HOURS PRN
Status: DISCONTINUED | OUTPATIENT
Start: 2023-02-16 | End: 2023-02-21

## 2023-02-16 RX ORDER — NICOTINE POLACRILEX 4 MG
15 LOZENGE BUCCAL
Status: DISCONTINUED | OUTPATIENT
Start: 2023-02-16 | End: 2023-02-21

## 2023-02-16 RX ORDER — ZOLPIDEM TARTRATE 5 MG/1
5 TABLET ORAL NIGHTLY
Status: DISCONTINUED | OUTPATIENT
Start: 2023-02-16 | End: 2023-02-21

## 2023-02-16 RX ORDER — METHYLPREDNISOLONE SODIUM SUCCINATE 125 MG/2ML
125 INJECTION, POWDER, LYOPHILIZED, FOR SOLUTION INTRAMUSCULAR; INTRAVENOUS ONCE
Status: COMPLETED | OUTPATIENT
Start: 2023-02-16 | End: 2023-02-16

## 2023-02-16 RX ORDER — PANTOPRAZOLE SODIUM 20 MG/1
20 TABLET, DELAYED RELEASE ORAL
Status: DISCONTINUED | OUTPATIENT
Start: 2023-02-17 | End: 2023-02-21

## 2023-02-16 RX ORDER — METOCLOPRAMIDE HYDROCHLORIDE 5 MG/ML
10 INJECTION INTRAMUSCULAR; INTRAVENOUS EVERY 8 HOURS PRN
Status: DISCONTINUED | OUTPATIENT
Start: 2023-02-16 | End: 2023-02-21

## 2023-02-16 RX ORDER — LOSARTAN POTASSIUM 100 MG/1
100 TABLET ORAL NIGHTLY
COMMUNITY
Start: 2023-02-07 | End: 2023-02-21

## 2023-02-16 RX ORDER — ACETAMINOPHEN 500 MG
500 TABLET ORAL EVERY 4 HOURS PRN
Status: DISCONTINUED | OUTPATIENT
Start: 2023-02-16 | End: 2023-02-16

## 2023-02-16 RX ORDER — NICOTINE POLACRILEX 4 MG
30 LOZENGE BUCCAL
Status: DISCONTINUED | OUTPATIENT
Start: 2023-02-16 | End: 2023-02-21

## 2023-02-16 RX ORDER — ONDANSETRON 2 MG/ML
4 INJECTION INTRAMUSCULAR; INTRAVENOUS EVERY 6 HOURS PRN
Status: DISCONTINUED | OUTPATIENT
Start: 2023-02-16 | End: 2023-02-21

## 2023-02-16 RX ORDER — ENOXAPARIN SODIUM 100 MG/ML
0.5 INJECTION SUBCUTANEOUS NIGHTLY
Status: DISCONTINUED | OUTPATIENT
Start: 2023-02-16 | End: 2023-02-18

## 2023-02-16 RX ORDER — ALLOPURINOL 300 MG/1
300 TABLET ORAL EVERY MORNING
Status: DISCONTINUED | OUTPATIENT
Start: 2023-02-17 | End: 2023-02-21

## 2023-02-16 RX ORDER — ASPIRIN 81 MG/1
81 TABLET ORAL EVERY MORNING
Status: DISCONTINUED | OUTPATIENT
Start: 2023-02-17 | End: 2023-02-21

## 2023-02-16 RX ORDER — FUROSEMIDE 10 MG/ML
40 INJECTION INTRAMUSCULAR; INTRAVENOUS ONCE
Status: DISCONTINUED | OUTPATIENT
Start: 2023-02-16 | End: 2023-02-16

## 2023-02-16 RX ORDER — FLUOXETINE HYDROCHLORIDE 20 MG/1
20 CAPSULE ORAL EVERY MORNING
Status: DISCONTINUED | OUTPATIENT
Start: 2023-02-17 | End: 2023-02-21

## 2023-02-16 RX ORDER — CYCLOBENZAPRINE HCL 10 MG
10 TABLET ORAL 2 TIMES DAILY PRN
Status: DISCONTINUED | OUTPATIENT
Start: 2023-02-16 | End: 2023-02-21

## 2023-02-16 RX ORDER — FLUOXETINE HYDROCHLORIDE 20 MG/1
40 CAPSULE ORAL NIGHTLY
Status: DISCONTINUED | OUTPATIENT
Start: 2023-02-16 | End: 2023-02-21

## 2023-02-16 RX ORDER — TRAMADOL HYDROCHLORIDE 50 MG/1
50 TABLET ORAL EVERY 6 HOURS PRN
Status: DISCONTINUED | OUTPATIENT
Start: 2023-02-16 | End: 2023-02-17

## 2023-02-16 RX ORDER — LOSARTAN POTASSIUM 50 MG/1
50 TABLET ORAL DAILY
COMMUNITY
End: 2023-02-21

## 2023-02-16 RX ORDER — DEXTROSE MONOHYDRATE 25 G/50ML
50 INJECTION, SOLUTION INTRAVENOUS
Status: DISCONTINUED | OUTPATIENT
Start: 2023-02-16 | End: 2023-02-21

## 2023-02-16 RX ORDER — PRAVASTATIN SODIUM 20 MG
20 TABLET ORAL NIGHTLY
Status: DISCONTINUED | OUTPATIENT
Start: 2023-02-16 | End: 2023-02-21

## 2023-02-16 RX ORDER — BUSPIRONE HYDROCHLORIDE 5 MG/1
20 TABLET ORAL 2 TIMES DAILY
Status: DISCONTINUED | OUTPATIENT
Start: 2023-02-16 | End: 2023-02-21

## 2023-02-16 RX ORDER — MONTELUKAST SODIUM 10 MG/1
10 TABLET ORAL NIGHTLY
Status: DISCONTINUED | OUTPATIENT
Start: 2023-02-16 | End: 2023-02-21

## 2023-02-16 RX ORDER — PREGABALIN 50 MG/1
100 CAPSULE ORAL 3 TIMES DAILY
Status: DISCONTINUED | OUTPATIENT
Start: 2023-02-16 | End: 2023-02-21

## 2023-02-16 RX ORDER — IPRATROPIUM BROMIDE AND ALBUTEROL SULFATE 2.5; .5 MG/3ML; MG/3ML
3 SOLUTION RESPIRATORY (INHALATION)
Status: DISCONTINUED | OUTPATIENT
Start: 2023-02-16 | End: 2023-02-17

## 2023-02-16 NOTE — ED QUICK NOTES
Orders for admission, patient is aware of plan and ready to go upstairs. Any questions, please call ED RN Debora Allen at extension [phone].      Patient Covid vaccination status: Fully vaccinated     COVID Test Ordered in ED: $$$$Respiratory Flu Expanded Panel + WSWDC-50$$$$    COVID Suspicion at Admission: N/A    Running Infusions:  None    Mental Status/LOC at time of transport: AAOX3    Other pertinent information:   CIWA score: N/A   NIH score:  N/A

## 2023-02-16 NOTE — ED QUICK NOTES
X-ray called and stated patient was complaining of pain in both hips. X-ray is going to modify order and take pictures of both hips.

## 2023-02-16 NOTE — ED QUICK NOTES
Report given to [facility].  Pt awaiting transport to floor

## 2023-02-16 NOTE — ED INITIAL ASSESSMENT (HPI)
Pt states she started feeling weak yesterday with some wheezing. Pt states she slipped out of wheelchair sometime in the middle of night and has been on the floor. Pt not sure how long. Pt denies LOC or head injury.  Pt fell onto right side and has abrasion to hip. +audible wheezing

## 2023-02-17 ENCOUNTER — APPOINTMENT (OUTPATIENT)
Dept: GENERAL RADIOLOGY | Facility: HOSPITAL | Age: 78
End: 2023-02-17
Attending: HOSPITALIST
Payer: MEDICARE

## 2023-02-17 ENCOUNTER — APPOINTMENT (OUTPATIENT)
Dept: CV DIAGNOSTICS | Facility: HOSPITAL | Age: 78
End: 2023-02-17
Attending: HOSPITALIST
Payer: MEDICARE

## 2023-02-17 LAB
ANION GAP SERPL CALC-SCNC: 4 MMOL/L (ref 0–18)
BASOPHILS # BLD AUTO: 0.02 X10(3) UL (ref 0–0.2)
BASOPHILS NFR BLD AUTO: 0.2 %
BUN BLD-MCNC: 23 MG/DL (ref 7–18)
CALCIUM BLD-MCNC: 9.3 MG/DL (ref 8.5–10.1)
CHLORIDE SERPL-SCNC: 106 MMOL/L (ref 98–112)
CO2 SERPL-SCNC: 28 MMOL/L (ref 21–32)
CREAT BLD-MCNC: 0.75 MG/DL
EOSINOPHIL # BLD AUTO: 0 X10(3) UL (ref 0–0.7)
EOSINOPHIL NFR BLD AUTO: 0 %
ERYTHROCYTE [DISTWIDTH] IN BLOOD BY AUTOMATED COUNT: 13.9 %
GFR SERPLBLD BASED ON 1.73 SQ M-ARVRAT: 81 ML/MIN/1.73M2 (ref 60–?)
GLUCOSE BLD-MCNC: 133 MG/DL (ref 70–99)
GLUCOSE BLD-MCNC: 136 MG/DL (ref 70–99)
GLUCOSE BLD-MCNC: 141 MG/DL (ref 70–99)
GLUCOSE BLD-MCNC: 180 MG/DL (ref 70–99)
GLUCOSE BLD-MCNC: 187 MG/DL (ref 70–99)
HCT VFR BLD AUTO: 48.6 %
HGB BLD-MCNC: 15.6 G/DL
IMM GRANULOCYTES # BLD AUTO: 0.1 X10(3) UL (ref 0–1)
IMM GRANULOCYTES NFR BLD: 0.8 %
L PNEUMO AG UR QL: NEGATIVE
LYMPHOCYTES # BLD AUTO: 0.65 X10(3) UL (ref 1–4)
LYMPHOCYTES NFR BLD AUTO: 5 %
MCH RBC QN AUTO: 30.5 PG (ref 26–34)
MCHC RBC AUTO-ENTMCNC: 32.1 G/DL (ref 31–37)
MCV RBC AUTO: 95.1 FL
MONOCYTES # BLD AUTO: 0.36 X10(3) UL (ref 0.1–1)
MONOCYTES NFR BLD AUTO: 2.8 %
NEUTROPHILS # BLD AUTO: 11.84 X10 (3) UL (ref 1.5–7.7)
NEUTROPHILS # BLD AUTO: 11.84 X10(3) UL (ref 1.5–7.7)
NEUTROPHILS NFR BLD AUTO: 91.2 %
OSMOLALITY SERPL CALC.SUM OF ELEC: 292 MOSM/KG (ref 275–295)
PLATELET # BLD AUTO: 154 10(3)UL (ref 150–450)
POTASSIUM SERPL-SCNC: 4.6 MMOL/L (ref 3.5–5.1)
RBC # BLD AUTO: 5.11 X10(6)UL
SODIUM SERPL-SCNC: 138 MMOL/L (ref 136–145)
STREP PNEUMO ANTIGEN, URINE: NEGATIVE
WBC # BLD AUTO: 13 X10(3) UL (ref 4–11)

## 2023-02-17 PROCEDURE — 93306 TTE W/DOPPLER COMPLETE: CPT | Performed by: HOSPITALIST

## 2023-02-17 PROCEDURE — 71045 X-RAY EXAM CHEST 1 VIEW: CPT | Performed by: HOSPITALIST

## 2023-02-17 PROCEDURE — 5A09357 ASSISTANCE WITH RESPIRATORY VENTILATION, LESS THAN 24 CONSECUTIVE HOURS, CONTINUOUS POSITIVE AIRWAY PRESSURE: ICD-10-PCS | Performed by: HOSPITALIST

## 2023-02-17 PROCEDURE — 99233 SBSQ HOSP IP/OBS HIGH 50: CPT | Performed by: HOSPITALIST

## 2023-02-17 RX ORDER — MORPHINE SULFATE 2 MG/ML
2 INJECTION, SOLUTION INTRAMUSCULAR; INTRAVENOUS EVERY 2 HOUR PRN
Status: DISCONTINUED | OUTPATIENT
Start: 2023-02-17 | End: 2023-02-19

## 2023-02-17 RX ORDER — HYDRALAZINE HYDROCHLORIDE 20 MG/ML
10 INJECTION INTRAMUSCULAR; INTRAVENOUS EVERY 6 HOURS PRN
Status: DISCONTINUED | OUTPATIENT
Start: 2023-02-17 | End: 2023-02-17

## 2023-02-17 RX ORDER — TERAZOSIN 5 MG/1
10 CAPSULE ORAL NIGHTLY
Status: DISCONTINUED | OUTPATIENT
Start: 2023-02-17 | End: 2023-02-21

## 2023-02-17 RX ORDER — FUROSEMIDE 10 MG/ML
20 INJECTION INTRAMUSCULAR; INTRAVENOUS ONCE
Status: COMPLETED | OUTPATIENT
Start: 2023-02-17 | End: 2023-02-17

## 2023-02-17 RX ORDER — TRAMADOL HYDROCHLORIDE 50 MG/1
50 TABLET ORAL 2 TIMES DAILY
Status: DISCONTINUED | OUTPATIENT
Start: 2023-02-17 | End: 2023-02-21

## 2023-02-17 RX ORDER — MORPHINE SULFATE 4 MG/ML
4 INJECTION, SOLUTION INTRAMUSCULAR; INTRAVENOUS EVERY 2 HOUR PRN
Status: DISCONTINUED | OUTPATIENT
Start: 2023-02-17 | End: 2023-02-19

## 2023-02-17 RX ORDER — LOSARTAN POTASSIUM 100 MG/1
100 TABLET ORAL DAILY
Status: DISCONTINUED | OUTPATIENT
Start: 2023-02-17 | End: 2023-02-18

## 2023-02-17 RX ORDER — MORPHINE SULFATE 2 MG/ML
1 INJECTION, SOLUTION INTRAMUSCULAR; INTRAVENOUS EVERY 2 HOUR PRN
Status: DISCONTINUED | OUTPATIENT
Start: 2023-02-17 | End: 2023-02-19

## 2023-02-17 RX ORDER — BENZONATATE 100 MG/1
100 CAPSULE ORAL 3 TIMES DAILY PRN
Status: DISCONTINUED | OUTPATIENT
Start: 2023-02-17 | End: 2023-02-21

## 2023-02-17 RX ORDER — CODEINE PHOSPHATE AND GUAIFENESIN 10; 100 MG/5ML; MG/5ML
5 SOLUTION ORAL NIGHTLY PRN
Status: DISCONTINUED | OUTPATIENT
Start: 2023-02-17 | End: 2023-02-21

## 2023-02-17 RX ORDER — IPRATROPIUM BROMIDE AND ALBUTEROL SULFATE 2.5; .5 MG/3ML; MG/3ML
3 SOLUTION RESPIRATORY (INHALATION)
Status: DISCONTINUED | OUTPATIENT
Start: 2023-02-17 | End: 2023-02-21

## 2023-02-17 NOTE — PLAN OF CARE
Assumed care of patient @ 0730 patient resting in bed, AOX4, reports 10/10 left lower leg / right phantom limb pain, medication provided. Lung sounds diminished with wheezing bilaterally, O2 saturation maintained on 2L NC. No complaints of shortness of breath or chest pain. NSR on tele, S1-S2 present, no adventitious sounds noted. Bowel sounds present and active in all quadrants, BM today. Patient incontinent of bowel and bladder, voiding via external catheter. Redness to left heel andd sacrum with bordered foams placed for protection. Left popliteal pulse 3+, right pedal pulse 1+. Plan of Care: IV abx, IV steroids, pain control, 2D echo, chest x-ray. Discussed plan of care with patient, verbalized understanding. Call light within reach. Flexeril provided this afternoon for lower extremity muscle cramping.      Problem: Diabetes/Glucose Control  Goal: Glucose maintained within prescribed range  Description: INTERVENTIONS:  - Monitor Blood Glucose as ordered  - Assess for signs and symptoms of hyperglycemia and hypoglycemia  - Administer ordered medications to maintain glucose within target range  - Assess barriers to adequate nutritional intake and initiate nutrition consult as needed  - Instruct patient on self management of diabetes  Outcome: Progressing     Problem: Patient/Family Goals  Goal: Patient/Family Long Term Goal  Description: Patient's Long Term Goal: DC home with no wheezing or SOB    Interventions:  -scheduled nebs  -IV solumedrol  -PRN O2  -IV antibiotics  -speech eval  - See additional Care Plan goals for specific interventions  Outcome: Progressing  Goal: Patient/Family Short Term Goal  Description: Patient's Short Term Goal: pain management    Interventions:   - PRN extra strength tylenol  -PRN tramadol  -lidocaine patch  -hot packs  -repositioning    - See additional Care Plan goals for specific interventions  Outcome: Progressing     Problem: CARDIOVASCULAR - ADULT  Goal: Maintains optimal cardiac output and hemodynamic stability  Description: INTERVENTIONS:  - Monitor vital signs, rhythm, and trends  - Monitor for bleeding, hypotension and signs of decreased cardiac output  - Evaluate effectiveness of vasoactive medications to optimize hemodynamic stability  - Monitor arterial and/or venous puncture sites for bleeding and/or hematoma  - Assess quality of pulses, skin color and temperature  - Assess for signs of decreased coronary artery perfusion - ex.  Angina  - Evaluate fluid balance, assess for edema, trend weights  Outcome: Progressing  Goal: Absence of cardiac arrhythmias or at baseline  Description: INTERVENTIONS:  - Continuous cardiac monitoring, monitor vital signs, obtain 12 lead EKG if indicated  - Evaluate effectiveness of antiarrhythmic and heart rate control medications as ordered  - Initiate emergency measures for life threatening arrhythmias  - Monitor electrolytes and administer replacement therapy as ordered  Outcome: Progressing     Problem: RESPIRATORY - ADULT  Goal: Achieves optimal ventilation and oxygenation  Description: INTERVENTIONS:  - Assess for changes in respiratory status  - Assess for changes in mentation and behavior  - Position to facilitate oxygenation and minimize respiratory effort  - Oxygen supplementation based on oxygen saturation or ABGs  - Provide Smoking Cessation handout, if applicable  - Encourage broncho-pulmonary hygiene including cough, deep breathe, Incentive Spirometry  - Assess the need for suctioning and perform as needed  - Assess and instruct to report SOB or any respiratory difficulty  - Respiratory Therapy support as indicated  - Manage/alleviate anxiety  - Monitor for signs/symptoms of CO2 retention  Outcome: Progressing

## 2023-02-17 NOTE — PLAN OF CARE
NURSING ADMISSION NOTE      Patient admitted via Cart  Oriented to room. Safety precautions initiated. Bed in low position. Call light in reach. Admission navigator completed. Pt AOx4, [place]. O2 sats maintained on 2 L NC, audible wheezing noted. NSR on tele. IVF infusing as ordered. Skin assessment completed with DICK Crawford. Redness noted to sacrum, pt offloaded with pillow support. Redness noted to L heel, mepliex placed. Redness/painful erythema noted to R hip crease and under bilateral breasts, endorsed to MD for creams from PTA med list.  Black scabs noted on L toe digits, wound care consult placed. Plan for PT/OT eval and echo in am. IV abx, IV steroids, and nebs. Pt updated on plan of care. 18:35: Call placed to Pati wang, per pt request to update her. No answer, will endorse.

## 2023-02-17 NOTE — PLAN OF CARE
AOX4. Requiring 2L nasal cannula to maintain O2 sat > 90%. Baseline is room air. Audible expiratory wheezing. Wears CPAP at night. NSR on tele. VSS. Complains of pain to R shoulder and bilateral hips despite PRN Tylenol, Tramadol, and warm compresses. Dr. Rangel Lee made aware, Tylenol dose increased, Lidocaine patch ordered. Plan for 2D echo, speech, PT and OT to see, continuing IV fluids, IV antibiotics, IV steroids, and scheduled nebs. Patient updated on plan of care. Patient complaining of severe R stump phantom pain this a.m. /78. Dr. Rangel Lee updated, orders received for PRN Hydralazine. No other orders received at this time.      Problem: Diabetes/Glucose Control  Goal: Glucose maintained within prescribed range  Description: INTERVENTIONS:  - Monitor Blood Glucose as ordered  - Assess for signs and symptoms of hyperglycemia and hypoglycemia  - Administer ordered medications to maintain glucose within target range  - Assess barriers to adequate nutritional intake and initiate nutrition consult as needed  - Instruct patient on self management of diabetes  2/17/2023 0111 by Brianna Calloway RN  Outcome: Progressing  2/17/2023 0111 by Brianna Calloway RN  Outcome: Progressing     Problem: Patient/Family Goals  Goal: Patient/Family Long Term Goal  Description: Patient's Long Term Goal: DC home with no wheezing or SOB    Interventions:  -scheduled nebs  -IV solumedrol  -PRN O2  -IV antibiotics  -speech eval  - See additional Care Plan goals for specific interventions  2/17/2023 0111 by Brianna Calloway RN  Outcome: Progressing  2/17/2023 0111 by Brianna Calloway RN  Outcome: Progressing  Goal: Patient/Family Short Term Goal  Description: Patient's Short Term Goal: pain management    Interventions:   - PRN extra strength tylenol  -PRN tramadol  -lidocaine patch  -hot packs  -repositioning    - See additional Care Plan goals for specific interventions  2/17/2023 0111 by Brianna Calloway RN  Outcome: Progressing  2/17/2023 0111 by Marilin Rosa RN  Outcome: Progressing     Problem: CARDIOVASCULAR - ADULT  Goal: Maintains optimal cardiac output and hemodynamic stability  Description: INTERVENTIONS:  - Monitor vital signs, rhythm, and trends  - Monitor for bleeding, hypotension and signs of decreased cardiac output  - Evaluate effectiveness of vasoactive medications to optimize hemodynamic stability  - Monitor arterial and/or venous puncture sites for bleeding and/or hematoma  - Assess quality of pulses, skin color and temperature  - Assess for signs of decreased coronary artery perfusion - ex.  Angina  - Evaluate fluid balance, assess for edema, trend weights  2/17/2023 0111 by Marilin Rosa RN  Outcome: Progressing  2/17/2023 0111 by Marilin Rosa RN  Outcome: Progressing  Goal: Absence of cardiac arrhythmias or at baseline  Description: INTERVENTIONS:  - Continuous cardiac monitoring, monitor vital signs, obtain 12 lead EKG if indicated  - Evaluate effectiveness of antiarrhythmic and heart rate control medications as ordered  - Initiate emergency measures for life threatening arrhythmias  - Monitor electrolytes and administer replacement therapy as ordered  2/17/2023 0111 by Marilin Rosa RN  Outcome: Progressing  2/17/2023 0111 by Marilin Rosa RN  Outcome: Progressing     Problem: RESPIRATORY - ADULT  Goal: Achieves optimal ventilation and oxygenation  Description: INTERVENTIONS:  - Assess for changes in respiratory status  - Assess for changes in mentation and behavior  - Position to facilitate oxygenation and minimize respiratory effort  - Oxygen supplementation based on oxygen saturation or ABGs  - Provide Smoking Cessation handout, if applicable  - Encourage broncho-pulmonary hygiene including cough, deep breathe, Incentive Spirometry  - Assess the need for suctioning and perform as needed  - Assess and instruct to report SOB or any respiratory difficulty  - Respiratory Therapy support as indicated  - Manage/alleviate anxiety  - Monitor for signs/symptoms of CO2 retention  2/17/2023 0111 by Jaspreet Vila RN  Outcome: Progressing  2/17/2023 0111 by Jaspreet Vila RN  Outcome: Progressing

## 2023-02-17 NOTE — CM/SW NOTE
02/17/23 1100   CM/SW Referral Data   Referral Source Social Work (self-referral); Nurse   Reason for Referral Discharge planning   Informant Patient;EMR   Patient Info   Patient's Current Mental Status at Time of Assessment Alert;Oriented   Patient's Home Environment Supportive Living   Post Acute Care Provider Upon Admission [facility] [place] [facility]   Patient lives with Alone   Discharge Needs   Anticipated D/C needs Home health care;Subacute rehab; To be determined;Transportation services     HOME SITUATION  Type of Home: Assisted living facility [facility] supportive living)  Home Layout: One level  Lives With: Alone (staff assists with showering)  Toilet and Equipment: Comfort height toilet; Toilet riser  Shower/Tub and Equipment: Walk-in shower;Grab bar; Shower chair  Other Equipment:  (wheelchair)  Occupation/Status: retired, used to work for [employer] that was part of [employer]: No     Prior Level of Function: lives at [facility] supportive living. Independent with ADL, except for showering. Uses manual wheelchair for mobility. R BKA. Patient places the wheelchair directly in front of her when seated at edge of bed. Pt stands, leans against the wheelchair with R residual limb, and turns 180 degrees on L foot to sit on the wheelchair. The same sequencing for wheelchair to toilet transfer. Pt has a raised toilet seat on comfort height toilet. Wheels herself up to toilet. Manages her own pills. Has assistance with cleaning, laundry, and meals. Patient used to work for [employer]. (Per OT evaluation)      Patient is a 77 y/o female who admitted with Hypoxia and Community acquired pneumonia, with history of R BKA. Spoke with PT/OT who are recommending [facility] but patient is declining. Met with patient, who was alert and oriented, to discuss discharge planning. She has lived at [address] [place] [address] for about 13.5 years.  They assist her with showering 1 x week, with laundry and bedding, cleaning her apartment and meals. She states she is able to complete transfers on her own with her manual (custom made) wheelchair at home. She has a cpap but no oxygen at home. She was one of 9 children, she is closest to her sister [place] who is her [facility]. SW discussed PT/OT recommendations of [facility] which she adamantly declined. When further discussing she states she has concerns that if she goes to [address] she will lose her apartment. Discussed that [facility] is covered by Medicare A/AARP and her apartment covered by Medicaid. She still declined [facility]. She was agreeable with [facility] which she states is [facility] preferred provider. Sent referral in aidin. Patient mentioned interest in working with PT/OT again with a wheelchair so she can show them how she normally transfers around. Spoke with Wilma Mcgee from [facility] to discuss patient's concerns regarding coverage. She is going to have someone from [facility] reach out to patient to discuss options. YONG will continue to follow.     Claudean Pean, LSW  Discharge Planner  [phone]

## 2023-02-18 LAB
ANION GAP SERPL CALC-SCNC: 1 MMOL/L (ref 0–18)
APTT PPP: 24.3 SECONDS (ref 23.3–35.6)
APTT PPP: 50.1 SECONDS (ref 23.3–35.6)
ATRIAL RATE: 113 BPM
BASOPHILS # BLD AUTO: 0.02 X10(3) UL (ref 0–0.2)
BASOPHILS NFR BLD AUTO: 0.1 %
BUN BLD-MCNC: 32 MG/DL (ref 7–18)
CALCIUM BLD-MCNC: 9.1 MG/DL (ref 8.5–10.1)
CHLORIDE SERPL-SCNC: 107 MMOL/L (ref 98–112)
CO2 SERPL-SCNC: 29 MMOL/L (ref 21–32)
CREAT BLD-MCNC: 0.8 MG/DL
EOSINOPHIL # BLD AUTO: 0 X10(3) UL (ref 0–0.7)
EOSINOPHIL NFR BLD AUTO: 0 %
ERYTHROCYTE [DISTWIDTH] IN BLOOD BY AUTOMATED COUNT: 14 %
GFR SERPLBLD BASED ON 1.73 SQ M-ARVRAT: 75 ML/MIN/1.73M2 (ref 60–?)
GLUCOSE BLD-MCNC: 146 MG/DL (ref 70–99)
GLUCOSE BLD-MCNC: 148 MG/DL (ref 70–99)
GLUCOSE BLD-MCNC: 157 MG/DL (ref 70–99)
GLUCOSE BLD-MCNC: 178 MG/DL (ref 70–99)
GLUCOSE BLD-MCNC: 211 MG/DL (ref 70–99)
GLUCOSE BLD-MCNC: 240 MG/DL (ref 70–99)
HCT VFR BLD AUTO: 47.9 %
HGB BLD-MCNC: 15.3 G/DL
IMM GRANULOCYTES # BLD AUTO: 0.08 X10(3) UL (ref 0–1)
IMM GRANULOCYTES NFR BLD: 0.5 %
INR BLD: 1.13 (ref 0.85–1.16)
LYMPHOCYTES # BLD AUTO: 0.58 X10(3) UL (ref 1–4)
LYMPHOCYTES NFR BLD AUTO: 3.9 %
MAGNESIUM SERPL-MCNC: 2.1 MG/DL (ref 1.6–2.6)
MCH RBC QN AUTO: 30.1 PG (ref 26–34)
MCHC RBC AUTO-ENTMCNC: 31.9 G/DL (ref 31–37)
MCV RBC AUTO: 94.1 FL
MONOCYTES # BLD AUTO: 0.55 X10(3) UL (ref 0.1–1)
MONOCYTES NFR BLD AUTO: 3.7 %
NEUTROPHILS # BLD AUTO: 13.52 X10 (3) UL (ref 1.5–7.7)
NEUTROPHILS # BLD AUTO: 13.52 X10(3) UL (ref 1.5–7.7)
NEUTROPHILS NFR BLD AUTO: 91.8 %
OSMOLALITY SERPL CALC.SUM OF ELEC: 299 MOSM/KG (ref 275–295)
PLATELET # BLD AUTO: 126 10(3)UL (ref 150–450)
POTASSIUM SERPL-SCNC: 4.1 MMOL/L (ref 3.5–5.1)
PROTHROMBIN TIME: 14.5 SECONDS (ref 11.6–14.8)
Q-T INTERVAL: 282 MS
QRS DURATION: 80 MS
QTC CALCULATION (BEZET): 395 MS
R AXIS: 19 DEGREES
RBC # BLD AUTO: 5.09 X10(6)UL
SODIUM SERPL-SCNC: 137 MMOL/L (ref 136–145)
T AXIS: 134 DEGREES
TROPONIN I HIGH SENSITIVITY: 32 NG/L
TSI SER-ACNC: 0.41 MIU/ML (ref 0.36–3.74)
VENTRICULAR RATE: 118 BPM
WBC # BLD AUTO: 14.8 X10(3) UL (ref 4–11)

## 2023-02-18 PROCEDURE — 99233 SBSQ HOSP IP/OBS HIGH 50: CPT | Performed by: HOSPITALIST

## 2023-02-18 RX ORDER — FUROSEMIDE 10 MG/ML
20 INJECTION INTRAMUSCULAR; INTRAVENOUS ONCE
Status: COMPLETED | OUTPATIENT
Start: 2023-02-18 | End: 2023-02-18

## 2023-02-18 RX ORDER — HEPARIN SODIUM AND DEXTROSE 10000; 5 [USP'U]/100ML; G/100ML
INJECTION INTRAVENOUS CONTINUOUS
Status: DISCONTINUED | OUTPATIENT
Start: 2023-02-18 | End: 2023-02-20

## 2023-02-18 RX ORDER — METHYLPREDNISOLONE SODIUM SUCCINATE 40 MG/ML
40 INJECTION, POWDER, LYOPHILIZED, FOR SOLUTION INTRAMUSCULAR; INTRAVENOUS EVERY 12 HOURS
Status: DISCONTINUED | OUTPATIENT
Start: 2023-02-19 | End: 2023-02-19

## 2023-02-18 RX ORDER — METHYLPREDNISOLONE SODIUM SUCCINATE 40 MG/ML
40 INJECTION, POWDER, LYOPHILIZED, FOR SOLUTION INTRAMUSCULAR; INTRAVENOUS EVERY 12 HOURS
Status: DISCONTINUED | OUTPATIENT
Start: 2023-02-19 | End: 2023-02-18

## 2023-02-18 RX ORDER — HEPARIN SODIUM 1000 [USP'U]/ML
5000 INJECTION, SOLUTION INTRAVENOUS; SUBCUTANEOUS ONCE
Status: COMPLETED | OUTPATIENT
Start: 2023-02-18 | End: 2023-02-18

## 2023-02-18 RX ORDER — HEPARIN SODIUM AND DEXTROSE 10000; 5 [USP'U]/100ML; G/100ML
1000 INJECTION INTRAVENOUS ONCE
Status: COMPLETED | OUTPATIENT
Start: 2023-02-18 | End: 2023-02-18

## 2023-02-18 RX ORDER — DILTIAZEM HYDROCHLORIDE 5 MG/ML
10 INJECTION INTRAVENOUS ONCE
Status: COMPLETED | OUTPATIENT
Start: 2023-02-18 | End: 2023-02-18

## 2023-02-18 NOTE — PHYSICAL THERAPY NOTE
Attempted to see patient for PT session, RN request no PT today secondary to patient is in a-fib. Will continue to follow-up and see patient when appropriate.

## 2023-02-18 NOTE — PLAN OF CARE
Problem: Diabetes/Glucose Control  Type II Diabetes  Novolog sliding scale for BG levels greater than 180. She reports she has a good appetite denies nausea or vomiting. Goal: Glucose maintained within prescribed range  POC Glucose testing four times daily  Stable at dinner time  Reports some Neuropathy, Lyrica helps with phantom pain and tingling. Description: INTERVENTIONS:  - Monitor Blood Glucose as ordered  - Assess for signs and symptoms of hyperglycemia and hypoglycemia  - Administer ordered medications to maintain glucose within target range  - Assess barriers to adequate nutritional intake and initiate nutrition consult as needed  - Instruct patient on self management of diabetes  Outcome: Progressing     Problem: Patient/Family Goals  Discharge planning in progress, reports living in supportive living, does not feel she is quite ready to return, yet is feeling stronger. Goal: Patient/Family Long Term Goal  Description: Patient's Long Term Goal: DC home with no wheezing or SOB    Interventions:  -scheduled nebs  -IV solumedrol  -PRN O2  -IV antibiotics  -speech eval  - See additional Care Plan goals for specific interventions  Outcome: Progressing  Goal: Patient/Family Short Term Goal  Reports intermittent pain, refusing IV medication at this time. Right leg involuntary movements , stump reddening, wrap with blanket to prevent further redness   Description: Patient's Short Term Goal: pain management    Interventions:   - PRN extra strength tylenol  -PRN tramadol  -lidocaine patch  -hot packs  -repositioning    - See additional Care Plan goals for specific interventions  Outcome: Progressing     Problem: CARDIOVASCULAR - ADULT  Vital signs are stable, denies chest pain or palpitations. Denies headache or dizziness.   Goal: Maintains optimal cardiac output and hemodynamic stability  Description: INTERVENTIONS:  - Monitor vital signs, rhythm, and trends  - Monitor for bleeding, hypotension and signs of decreased cardiac output  - Evaluate effectiveness of vasoactive medications to optimize hemodynamic stability  - Monitor arterial and/or venous puncture sites for bleeding and/or hematoma  - Assess quality of pulses, skin color and temperature  - Assess for signs of decreased coronary artery perfusion - ex.  Angina  - Evaluate fluid balance, assess for edema, trend weights  Outcome: Progressing  Goal: Absence of cardiac arrhythmias or at baseline  NSR, ST  Description: INTERVENTIONS:  - Continuous cardiac monitoring, monitor vital signs, obtain 12 lead EKG if indicated  - Evaluate effectiveness of antiarrhythmic and heart rate control medications as ordered  - Initiate emergency measures for life threatening arrhythmias  - Monitor electrolytes and administer replacement therapy as ordered  Outcome: Progressing     Problem: RESPIRATORY - ADULT  Supplemental oxygen in use at 3 liters per nasal cannula, denies shortness of breath  Goal: Achieves optimal ventilation and oxygenation  Cpap at [facility], compliant, Continuous pulse oximetry in progress, oxygen saturation 96%  IV steroids for inflammation,   Description: INTERVENTIONS:  - Assess for changes in respiratory status  - Assess for changes in mentation and behavior  - Position to facilitate oxygenation and minimize respiratory effort  - Oxygen supplementation based on oxygen saturation or ABGs  - Provide Smoking Cessation handout, if applicable  - Encourage broncho-pulmonary hygiene including cough, deep breathe, Incentive Spirometry  - Assess the need for suctioning and perform as needed  - Assess and instruct to report SOB or any respiratory difficulty  - Respiratory Therapy support as indicated  - Manage/alleviate anxiety  - Monitor for signs/symptoms of CO2 retention  Outcome: Progressing     Problem: Patient/Family Goals  Discharge planning in progress,   Goal: Patient/Family Long Term Goal  Description: Patient's Long Term Goal: DC home with no wheezing or SOB    Interventions:  -scheduled nebs  -IV solumedrol  -PRN O2  -IV antibiotics  -speech eval  - See additional Care Plan goals for specific interventions  Outcome: Progressing     Problem: Patient/Family Goals  Goal: Patient/Family Short Term Goal  Description: Patient's Short Term Goal: pain management    Interventions:   - PRN extra strength tylenol  -PRN tramadol  -lidocaine patch  -hot packs  -repositioning    - See additional Care Plan goals for specific interventions  Outcome: Progressing     Problem: CARDIOVASCULAR - ADULT  Goal: Maintains optimal cardiac output and hemodynamic stability  Description: INTERVENTIONS:  - Monitor vital signs, rhythm, and trends  - Monitor for bleeding, hypotension and signs of decreased cardiac output  - Evaluate effectiveness of vasoactive medications to optimize hemodynamic stability  - Monitor arterial and/or venous puncture sites for bleeding and/or hematoma  - Assess quality of pulses, skin color and temperature  - Assess for signs of decreased coronary artery perfusion - ex.  Angina  - Evaluate fluid balance, assess for edema, trend weights  Outcome: Progressing     Problem: CARDIOVASCULAR - ADULT  Goal: Absence of cardiac arrhythmias or at baseline  Description: INTERVENTIONS:  - Continuous cardiac monitoring, monitor vital signs, obtain 12 lead EKG if indicated  - Evaluate effectiveness of antiarrhythmic and heart rate control medications as ordered  - Initiate emergency measures for life threatening arrhythmias  - Monitor electrolytes and administer replacement therapy as ordered  Outcome: Progressing     Problem: RESPIRATORY - ADULT  Goal: Achieves optimal ventilation and oxygenation  Description: INTERVENTIONS:  - Assess for changes in respiratory status  - Assess for changes in mentation and behavior  - Position to facilitate oxygenation and minimize respiratory effort  - Oxygen supplementation based on oxygen saturation or ABGs  - Provide Smoking Cessation handout, if applicable  - Encourage broncho-pulmonary hygiene including cough, deep breathe, Incentive Spirometry  - Assess the need for suctioning and perform as needed  - Assess and instruct to report SOB or any respiratory difficulty  - Respiratory Therapy support as indicated  - Manage/alleviate anxiety  - Monitor for signs/symptoms of CO2 retention  Outcome: Progressing

## 2023-02-19 LAB
ANION GAP SERPL CALC-SCNC: 4 MMOL/L (ref 0–18)
APTT PPP: 36.9 SECONDS (ref 23.3–35.6)
APTT PPP: 43.5 SECONDS (ref 23.3–35.6)
APTT PPP: 72.4 SECONDS (ref 23.3–35.6)
BASOPHILS # BLD AUTO: 0.01 X10(3) UL (ref 0–0.2)
BASOPHILS NFR BLD AUTO: 0.1 %
BUN BLD-MCNC: 39 MG/DL (ref 7–18)
CALCIUM BLD-MCNC: 9.1 MG/DL (ref 8.5–10.1)
CHLORIDE SERPL-SCNC: 108 MMOL/L (ref 98–112)
CO2 SERPL-SCNC: 27 MMOL/L (ref 21–32)
CREAT BLD-MCNC: 0.82 MG/DL
EOSINOPHIL # BLD AUTO: 0.01 X10(3) UL (ref 0–0.7)
EOSINOPHIL NFR BLD AUTO: 0.1 %
ERYTHROCYTE [DISTWIDTH] IN BLOOD BY AUTOMATED COUNT: 14.3 %
GFR SERPLBLD BASED ON 1.73 SQ M-ARVRAT: 73 ML/MIN/1.73M2 (ref 60–?)
GLUCOSE BLD-MCNC: 128 MG/DL (ref 70–99)
GLUCOSE BLD-MCNC: 131 MG/DL (ref 70–99)
GLUCOSE BLD-MCNC: 136 MG/DL (ref 70–99)
GLUCOSE BLD-MCNC: 138 MG/DL (ref 70–99)
GLUCOSE BLD-MCNC: 206 MG/DL (ref 70–99)
HCT VFR BLD AUTO: 50.4 %
HGB BLD-MCNC: 15.9 G/DL
IMM GRANULOCYTES # BLD AUTO: 0.08 X10(3) UL (ref 0–1)
IMM GRANULOCYTES NFR BLD: 0.7 %
LYMPHOCYTES # BLD AUTO: 0.95 X10(3) UL (ref 1–4)
LYMPHOCYTES NFR BLD AUTO: 7.9 %
MAGNESIUM SERPL-MCNC: 2.1 MG/DL (ref 1.6–2.6)
MCH RBC QN AUTO: 29.8 PG (ref 26–34)
MCHC RBC AUTO-ENTMCNC: 31.5 G/DL (ref 31–37)
MCV RBC AUTO: 94.4 FL
MONOCYTES # BLD AUTO: 0.94 X10(3) UL (ref 0.1–1)
MONOCYTES NFR BLD AUTO: 7.9 %
NEUTROPHILS # BLD AUTO: 9.97 X10 (3) UL (ref 1.5–7.7)
NEUTROPHILS # BLD AUTO: 9.97 X10(3) UL (ref 1.5–7.7)
NEUTROPHILS NFR BLD AUTO: 83.3 %
OSMOLALITY SERPL CALC.SUM OF ELEC: 299 MOSM/KG (ref 275–295)
PLATELET # BLD AUTO: 129 10(3)UL (ref 150–450)
POTASSIUM SERPL-SCNC: 4.1 MMOL/L (ref 3.5–5.1)
RBC # BLD AUTO: 5.34 X10(6)UL
SODIUM SERPL-SCNC: 139 MMOL/L (ref 136–145)
WBC # BLD AUTO: 12 X10(3) UL (ref 4–11)

## 2023-02-19 PROCEDURE — 99233 SBSQ HOSP IP/OBS HIGH 50: CPT | Performed by: HOSPITALIST

## 2023-02-19 RX ORDER — POLYETHYLENE GLYCOL 3350 17 G/17G
17 POWDER, FOR SOLUTION ORAL DAILY
Status: DISCONTINUED | OUTPATIENT
Start: 2023-02-19 | End: 2023-02-21

## 2023-02-19 RX ORDER — HEPARIN SODIUM 1000 [USP'U]/ML
3000 INJECTION, SOLUTION INTRAVENOUS; SUBCUTANEOUS ONCE
Status: COMPLETED | OUTPATIENT
Start: 2023-02-19 | End: 2023-02-19

## 2023-02-19 RX ORDER — DOCUSATE SODIUM 100 MG/1
100 CAPSULE, LIQUID FILLED ORAL 2 TIMES DAILY
Status: DISCONTINUED | OUTPATIENT
Start: 2023-02-19 | End: 2023-02-21

## 2023-02-19 RX ORDER — PREDNISONE 20 MG/1
40 TABLET ORAL
Status: DISCONTINUED | OUTPATIENT
Start: 2023-02-19 | End: 2023-02-21

## 2023-02-19 NOTE — PLAN OF CARE
Assumed care of patient @ 0730 patient resting in bed, AOX4. Lung sounds diminished with wheezing bilaterally improved from yesterday, O2 saturation maintained on 3L NC. No complaints of shortness of breath or chest pain. NSR on tele, S1-S2 present, no adventitious sounds noted. Bowel sounds present and active in all quadrants, BM 2/17/23. Patient incontinent of bowel and bladder, voiding via external catheter. Redness to left heel and sacrum with bordered foams for protection. Left popliteal pulse 3+, right pedal pulse 1+. Various pains today including right shoulder, severe right stump phantom pain, left knee, right hip (not new d/t recent fall), medications provided. Plan of Care: IV abx, IV steroids, pain control, heparin gtt, cardizem gtt. Discussed plan of care with patient, verbalized understanding. Call light within reach. Approx 1100 patient rhythm to afib with RVR. Hospitalist at bedside, initiated heparin and cardizem gtt, cardizem PO.         Problem: Diabetes/Glucose Control  Goal: Glucose maintained within prescribed range  Description: INTERVENTIONS:  - Monitor Blood Glucose as ordered  - Assess for signs and symptoms of hyperglycemia and hypoglycemia  - Administer ordered medications to maintain glucose within target range  - Assess barriers to adequate nutritional intake and initiate nutrition consult as needed  - Instruct patient on self management of diabetes  Outcome: Progressing     Problem: Patient/Family Goals  Goal: Patient/Family Long Term Goal  Description: Patient's Long Term Goal: DC home with no wheezing or SOB    Interventions:  -scheduled nebs  -IV solumedrol  -PRN O2  -IV antibiotics  -speech eval  - See additional Care Plan goals for specific interventions  Outcome: Progressing  Goal: Patient/Family Short Term Goal  Description: Patient's Short Term Goal: pain management    Interventions:   - PRN extra strength tylenol  -PRN tramadol  -lidocaine patch  -hot packs  -repositioning    - See additional Care Plan goals for specific interventions  Outcome: Progressing     Problem: CARDIOVASCULAR - ADULT  Goal: Maintains optimal cardiac output and hemodynamic stability  Description: INTERVENTIONS:  - Monitor vital signs, rhythm, and trends  - Monitor for bleeding, hypotension and signs of decreased cardiac output  - Evaluate effectiveness of vasoactive medications to optimize hemodynamic stability  - Monitor arterial and/or venous puncture sites for bleeding and/or hematoma  - Assess quality of pulses, skin color and temperature  - Assess for signs of decreased coronary artery perfusion - ex.  Angina  - Evaluate fluid balance, assess for edema, trend weights  Outcome: Progressing  Goal: Absence of cardiac arrhythmias or at baseline  Description: INTERVENTIONS:  - Continuous cardiac monitoring, monitor vital signs, obtain 12 lead EKG if indicated  - Evaluate effectiveness of antiarrhythmic and heart rate control medications as ordered  - Initiate emergency measures for life threatening arrhythmias  - Monitor electrolytes and administer replacement therapy as ordered  Outcome: Progressing     Problem: RESPIRATORY - ADULT  Goal: Achieves optimal ventilation and oxygenation  Description: INTERVENTIONS:  - Assess for changes in respiratory status  - Assess for changes in mentation and behavior  - Position to facilitate oxygenation and minimize respiratory effort  - Oxygen supplementation based on oxygen saturation or ABGs  - Provide Smoking Cessation handout, if applicable  - Encourage broncho-pulmonary hygiene including cough, deep breathe, Incentive Spirometry  - Assess the need for suctioning and perform as needed  - Assess and instruct to report SOB or any respiratory difficulty  - Respiratory Therapy support as indicated  - Manage/alleviate anxiety  - Monitor for signs/symptoms of CO2 retention  Outcome: Progressing

## 2023-02-19 NOTE — PLAN OF CARE
Assumed care of patient @ 0730, patient is A&O x4, Total lift, complain of mild back pain, relieved by tramadol. Patient is AFIB, with controlled rates, with diltiazem gtt and Diltiazem PO. Heparin gtt per ACS/AFIB protocol. Patient has some dyspnea with exertion, some redness to sacrum covered by mepilex. Patient is incontinent of bladder and bowel. Last bm prior to admission. Stool softeners ordered and given. Call light and belongings within reach, fall precautions in place, patient updated on plan of care. 1600: patient transferred max assist stand and pivot to bed with staff, patient had medium soft formed bowel movement on bed pan in bed.        Problem: Diabetes/Glucose Control  Goal: Glucose maintained within prescribed range  Description: INTERVENTIONS:  - Monitor Blood Glucose as ordered  - Assess for signs and symptoms of hyperglycemia and hypoglycemia  - Administer ordered medications to maintain glucose within target range  - Assess barriers to adequate nutritional intake and initiate nutrition consult as needed  - Instruct patient on self management of diabetes  Outcome: Progressing     Problem: Patient/Family Goals  Goal: Patient/Family Long Term Goal  Description: Patient's Long Term Goal: DC home with no wheezing or SOB    Interventions:  -scheduled nebs  -IV solumedrol  -PRN O2  -IV antibiotics  -speech eval  - See additional Care Plan goals for specific interventions  Outcome: Progressing  Goal: Patient/Family Short Term Goal  Description: Patient's Short Term Goal: pain management    Interventions:   - PRN extra strength tylenol  -PRN tramadol  -lidocaine patch  -hot packs  -repositioning    - See additional Care Plan goals for specific interventions  Outcome: Progressing     Problem: CARDIOVASCULAR - ADULT  Goal: Maintains optimal cardiac output and hemodynamic stability  Description: INTERVENTIONS:  - Monitor vital signs, rhythm, and trends  - Monitor for bleeding, hypotension and signs of decreased cardiac output  - Evaluate effectiveness of vasoactive medications to optimize hemodynamic stability  - Monitor arterial and/or venous puncture sites for bleeding and/or hematoma  - Assess quality of pulses, skin color and temperature  - Assess for signs of decreased coronary artery perfusion - ex.  Angina  - Evaluate fluid balance, assess for edema, trend weights  Outcome: Progressing  Goal: Absence of cardiac arrhythmias or at baseline  Description: INTERVENTIONS:  - Continuous cardiac monitoring, monitor vital signs, obtain 12 lead EKG if indicated  - Evaluate effectiveness of antiarrhythmic and heart rate control medications as ordered  - Initiate emergency measures for life threatening arrhythmias  - Monitor electrolytes and administer replacement therapy as ordered  Outcome: Progressing     Problem: RESPIRATORY - ADULT  Goal: Achieves optimal ventilation and oxygenation  Description: INTERVENTIONS:  - Assess for changes in respiratory status  - Assess for changes in mentation and behavior  - Position to facilitate oxygenation and minimize respiratory effort  - Oxygen supplementation based on oxygen saturation or ABGs  - Provide Smoking Cessation handout, if applicable  - Encourage broncho-pulmonary hygiene including cough, deep breathe, Incentive Spirometry  - Assess the need for suctioning and perform as needed  - Assess and instruct to report SOB or any respiratory difficulty  - Respiratory Therapy support as indicated  - Manage/alleviate anxiety  - Monitor for signs/symptoms of CO2 retention  Outcome: Progressing

## 2023-02-19 NOTE — PLAN OF CARE
Problem: Diabetes/Glucose Control  Type II Diabetes  Novolog sliding scale for BG levels greater than 180. She reports she has a good appetite denies nausea or vomiting. Goal: Glucose maintained within prescribed range  POC Glucose testing four times daily  Stable at dinner time  Reports some Neuropathy, Lyrica helps with phantom pain and tingling./ less involuntary movements today, tramadol . Lyrica given with nightly medications. Description: INTERVENTIONS:  - Monitor Blood Glucose as ordered  - Assess for signs and symptoms of hyperglycemia and hypoglycemia  - Administer ordered medications to maintain glucose within target range  - Assess barriers to adequate nutritional intake and initiate nutrition consult as needed  - Instruct patient on self management of diabetes  Outcome: Progressing     Problem: Patient/Family Goals  Discharge planning in progress, reports living in supportive living, does not feel she is quite ready to return, yet is feeling stronger. Goal: Patient/Family Long Term Goal  Description: Patient's Long Term Goal: DC home with no wheezing or SOB    Interventions:  -scheduled nebs  -IV solumedrol  -PRN O2  -IV antibiotics  -speech eval  - See additional Care Plan goals for specific interventions  Outcome: Progressing  Goal: Patient/Family Short Term Goal  Reports intermittent pain, refusing IV medication at this time,though accepted a dose this morning. Lidocaine patch to left knee noted today, states right shoulder pain from fall has improved.   Right leg involuntary movements , stump reddening, wrap with blanket to prevent further redness , protective dressings easily removed with frequent movments  Description: Patient's Short Term Goal: pain management    Interventions:   - PRN extra strength tylenol  -PRN tramadol  -lidocaine patch  -hot packs  -repositioning    - See additional Care Plan goals for specific interventions  Outcome: Progressing     Problem: CARDIOVASCULAR - ADULT  Vital signs are stable, denies chest pain or palpitations. Now in atrial fibrillation, rate controlled with Diltiazem gtt at 5  Mg /hr and oral 30 mg  Every 6 hrs Denies headache or dizziness. Heparin gtt infusing per A-fib protocol, PTT is therapeutic  On last lab draw. Goal: Maintains optimal cardiac output and hemodynamic stability  Description: INTERVENTIONS:  - Monitor vital signs, rhythm, and trends  - Monitor for bleeding, hypotension and signs of decreased cardiac output  - Evaluate effectiveness of vasoactive medications to optimize hemodynamic stability  - Monitor arterial and/or venous puncture sites for bleeding and/or hematoma  - Assess quality of pulses, skin color and temperature  - Assess for signs of decreased coronary artery perfusion - ex.  Angina  - Evaluate fluid balance, assess for edema, trend weights  Outcome: Progressing  Goal: Absence of cardiac arrhythmias or at baseline  Irregular, A-fib controlled  Description: INTERVENTIONS:  - Continuous cardiac monitoring, monitor vital signs, obtain 12 lead EKG if indicated  - Evaluate effectiveness of antiarrhythmic and heart rate control medications as ordered  - Initiate emergency measures for life threatening arrhythmias  - Monitor electrolytes and administer replacement therapy as ordered  Outcome: Progressing     Problem: RESPIRATORY - ADULT  Supplemental oxygen in use at 3 liters per nasal cannula, denies shortness of breath  IV steroids q 12 hours  Goal: Achieves optimal ventilation and oxygenation  Cpap at [facility], compliant, Continuous pulse oximetry in progress, oxygen saturation 96%    Description: INTERVENTIONS:  - Assess for changes in respiratory status  - Assess for changes in mentation and behavior  - Position to facilitate oxygenation and minimize respiratory effort  - Oxygen supplementation based on oxygen saturation or ABGs  - Provide Smoking Cessation handout, if applicable  - Encourage broncho-pulmonary hygiene including cough, deep breathe, Incentive Spirometry  - Assess the need for suctioning and perform as needed  - Assess and instruct to report SOB or any respiratory difficulty  - Respiratory Therapy support as indicated  - Manage/alleviate anxiety  - Monitor for signs/symptoms of CO2 retention  Outcome: Progressing     Problem: Patient/Family Goals  Discharge planning in progress,   Goal: Patient/Family Long Term Goal  Description: Patient's Long Term Goal: DC home with no wheezing or SOB    Interventions:  -scheduled nebs  -IV solumedrol  -PRN O2  -IV antibiotics  -speech eval  - See additional Care Plan goals for specific interventions  Outcome: Progressing     Problem: Patient/Family Goals  Goal: Patient/Family Short Term Goal  Description: Patient's Short Term Goal: pain management    Interventions:   - PRN extra strength tylenol  -PRN tramadol  -lidocaine patch  -hot packs  -repositioning    - See additional Care Plan goals for specific interventions  Outcome: Progressing

## 2023-02-20 LAB
APTT PPP: 44.8 SECONDS (ref 23.3–35.6)
APTT PPP: 80.1 SECONDS (ref 23.3–35.6)
GLUCOSE BLD-MCNC: 119 MG/DL (ref 70–99)
GLUCOSE BLD-MCNC: 142 MG/DL (ref 70–99)
GLUCOSE BLD-MCNC: 159 MG/DL (ref 70–99)
GLUCOSE BLD-MCNC: 160 MG/DL (ref 70–99)

## 2023-02-20 PROCEDURE — 99232 SBSQ HOSP IP/OBS MODERATE 35: CPT | Performed by: HOSPITALIST

## 2023-02-20 NOTE — CM/SW NOTE
Spoke with NP and acknowledged order to check Eliquis cost. Spoke with Mariya Conner who confirmed cost is $0. SW will remain available.      OLYA Enamorado  Discharge Planner  [phone]

## 2023-02-20 NOTE — RESPIRATORY THERAPY NOTE
Patient used  Bipap  ( spontaneous mode, no back-up rate ) for ABDIAZIZ last night and this am  for a total of  12 Hrs ,  30min.

## 2023-02-20 NOTE — PLAN OF CARE
Assumed care of patient at 1. Patient alert and oriented x4. Afib on telemetry with heart rate ranging 80s-low 100s. On 1L nasal cannula while awake, CPAP at night. Heparin gtt infusing per orders. Cardizem gtt infusing, patient is receiving PO cardizem as well. Tramadol given for pain. Elspeth Enoc in place for incontinence and strict intake output. Redness and pain noted below right abdominal fold-cleansed-photo uploaded to chart. Redness to sacrum with Mepilex in place. Patient updated on plan of care. Call light within reach. [id number]: Cardizem gtt stopped. HR 60s. PO Cardizem given per orders.      Problem: Diabetes/Glucose Control  Goal: Glucose maintained within prescribed range  Description: INTERVENTIONS:  - Monitor Blood Glucose as ordered  - Assess for signs and symptoms of hyperglycemia and hypoglycemia  - Administer ordered medications to maintain glucose within target range  - Assess barriers to adequate nutritional intake and initiate nutrition consult as needed  - Instruct patient on self management of diabetes  Outcome: Progressing     Problem: Patient/Family Goals  Goal: Patient/Family Long Term Goal  Description: Patient's Long Term Goal: DC home with no wheezing or SOB    Interventions:  -scheduled nebs  -IV solumedrol  -PRN O2  -IV antibiotics  -speech eval  - See additional Care Plan goals for specific interventions  Outcome: Progressing  Goal: Patient/Family Short Term Goal  Description: Patient's Short Term Goal: pain management    Interventions:   - PRN extra strength tylenol  -PRN tramadol  -lidocaine patch  -hot packs  -repositioning    - See additional Care Plan goals for specific interventions  Outcome: Progressing     Problem: CARDIOVASCULAR - ADULT  Goal: Maintains optimal cardiac output and hemodynamic stability  Description: INTERVENTIONS:  - Monitor vital signs, rhythm, and trends  - Monitor for bleeding, hypotension and signs of decreased cardiac output  - Evaluate effectiveness of vasoactive medications to optimize hemodynamic stability  - Monitor arterial and/or venous puncture sites for bleeding and/or hematoma  - Assess quality of pulses, skin color and temperature  - Assess for signs of decreased coronary artery perfusion - ex.  Angina  - Evaluate fluid balance, assess for edema, trend weights  Outcome: Progressing  Goal: Absence of cardiac arrhythmias or at baseline  Description: INTERVENTIONS:  - Continuous cardiac monitoring, monitor vital signs, obtain 12 lead EKG if indicated  - Evaluate effectiveness of antiarrhythmic and heart rate control medications as ordered  - Initiate emergency measures for life threatening arrhythmias  - Monitor electrolytes and administer replacement therapy as ordered  Outcome: Progressing     Problem: RESPIRATORY - ADULT  Goal: Achieves optimal ventilation and oxygenation  Description: INTERVENTIONS:  - Assess for changes in respiratory status  - Assess for changes in mentation and behavior  - Position to facilitate oxygenation and minimize respiratory effort  - Oxygen supplementation based on oxygen saturation or ABGs  - Provide Smoking Cessation handout, if applicable  - Encourage broncho-pulmonary hygiene including cough, deep breathe, Incentive Spirometry  - Assess the need for suctioning and perform as needed  - Assess and instruct to report SOB or any respiratory difficulty  - Respiratory Therapy support as indicated  - Manage/alleviate anxiety  - Monitor for signs/symptoms of CO2 retention  Outcome: Progressing

## 2023-02-21 VITALS
RESPIRATION RATE: 20 BRPM | OXYGEN SATURATION: 91 % | HEIGHT: 70 IN | WEIGHT: 293 LBS | TEMPERATURE: 99 F | SYSTOLIC BLOOD PRESSURE: 160 MMHG | BODY MASS INDEX: 41.95 KG/M2 | HEART RATE: 89 BPM | DIASTOLIC BLOOD PRESSURE: 67 MMHG

## 2023-02-21 LAB — GLUCOSE BLD-MCNC: 135 MG/DL (ref 70–99)

## 2023-02-21 RX ORDER — IPRATROPIUM BROMIDE AND ALBUTEROL SULFATE 2.5; .5 MG/3ML; MG/3ML
3 SOLUTION RESPIRATORY (INHALATION) EVERY 4 HOURS PRN
Status: DISCONTINUED | OUTPATIENT
Start: 2023-02-21 | End: 2023-02-21

## 2023-02-21 RX ORDER — DILTIAZEM HYDROCHLORIDE 180 MG/1
180 CAPSULE, COATED, EXTENDED RELEASE ORAL DAILY
Qty: 90 CAPSULE | Refills: 3 | Status: SHIPPED | OUTPATIENT
Start: 2023-02-22

## 2023-02-21 RX ORDER — DILTIAZEM HYDROCHLORIDE 180 MG/1
180 CAPSULE, EXTENDED RELEASE ORAL DAILY
Status: DISCONTINUED | OUTPATIENT
Start: 2023-02-21 | End: 2023-02-21

## 2023-02-21 RX ORDER — PREDNISONE 20 MG/1
40 TABLET ORAL
Qty: 3 TABLET | Refills: 0 | Status: SHIPPED | OUTPATIENT
Start: 2023-02-22 | End: 2023-02-25

## 2023-02-21 RX ORDER — GUAIFENESIN 600 MG/1
600 TABLET, EXTENDED RELEASE ORAL 2 TIMES DAILY
Qty: 6 TABLET | Refills: 0 | Status: SHIPPED | OUTPATIENT
Start: 2023-02-21 | End: 2023-02-24

## 2023-02-21 NOTE — PLAN OF CARE
Patient cleared for discharge by primary and consults. IV removed, tele monitor discontinued. Discharge paperwork/information given, including medications and follow-up appointments. Eliquis (delivered w/ meds to beds) sent with patient. All questions answered. All belongings sent with patient. Transported off unit via [facility]. Report given to [facility] at 1400.     Problem: Diabetes/Glucose Control  Goal: Glucose maintained within prescribed range  Description: INTERVENTIONS:  - Monitor Blood Glucose as ordered  - Assess for signs and symptoms of hyperglycemia and hypoglycemia  - Administer ordered medications to maintain glucose within target range  - Assess barriers to adequate nutritional intake and initiate nutrition consult as needed  - Instruct patient on self management of diabetes  2/21/2023 1623 by Tomasa Gloria RN  Outcome: Adequate for Discharge  2/21/2023 0950 by Tomasa Gloria RN  Outcome: Progressing     Problem: Patient/Family Goals  Goal: Patient/Family Long Term Goal  Description: Patient's Long Term Goal: DC home with no wheezing or SOB    Interventions:  -scheduled nebs  -IV solumedrol  -PRN O2  -IV antibiotics  -speech eval  - See additional Care Plan goals for specific interventions  2/21/2023 1623 by Tomasa Gloria RN  Outcome: Adequate for Discharge  2/21/2023 0950 by Tomasa Gloria RN  Outcome: Progressing  Goal: Patient/Family Short Term Goal  Description: Patient's Short Term Goal: pain management    Interventions:   - PRN extra strength tylenol  -PRN tramadol  -lidocaine patch  -hot packs  -repositioning    - See additional Care Plan goals for specific interventions  2/21/2023 1623 by Tomasa Gloria RN  Outcome: Adequate for Discharge  2/21/2023 0950 by Tomasa Gloria RN  Outcome: Progressing     Problem: CARDIOVASCULAR - ADULT  Goal: Maintains optimal cardiac output and hemodynamic stability  Description: INTERVENTIONS:  - Monitor vital signs, rhythm, and trends  - Monitor for bleeding, hypotension and signs of decreased cardiac output  - Evaluate effectiveness of vasoactive medications to optimize hemodynamic stability  - Monitor arterial and/or venous puncture sites for bleeding and/or hematoma  - Assess quality of pulses, skin color and temperature  - Assess for signs of decreased coronary artery perfusion - ex.  Angina  - Evaluate fluid balance, assess for edema, trend weights  2/21/2023 1623 by Danisha Le RN  Outcome: Adequate for Discharge  2/21/2023 0950 by Danisha Le RN  Outcome: Progressing  Goal: Absence of cardiac arrhythmias or at baseline  Description: INTERVENTIONS:  - Continuous cardiac monitoring, monitor vital signs, obtain 12 lead EKG if indicated  - Evaluate effectiveness of antiarrhythmic and heart rate control medications as ordered  - Initiate emergency measures for life threatening arrhythmias  - Monitor electrolytes and administer replacement therapy as ordered  2/21/2023 1623 by Danisha Le RN  Outcome: Adequate for Discharge  2/21/2023 0950 by Danisha Le RN  Outcome: Progressing     Problem: RESPIRATORY - ADULT  Goal: Achieves optimal ventilation and oxygenation  Description: INTERVENTIONS:  - Assess for changes in respiratory status  - Assess for changes in mentation and behavior  - Position to facilitate oxygenation and minimize respiratory effort  - Oxygen supplementation based on oxygen saturation or ABGs  - Provide Smoking Cessation handout, if applicable  - Encourage broncho-pulmonary hygiene including cough, deep breathe, Incentive Spirometry  - Assess the need for suctioning and perform as needed  - Assess and instruct to report SOB or any respiratory difficulty  - Respiratory Therapy support as indicated  - Manage/alleviate anxiety  - Monitor for signs/symptoms of CO2 retention  2/21/2023 1623 by Danisha Le RN  Outcome: Adequate for Discharge  2/21/2023 0950 by Linh Felix Collette RN  Outcome: Progressing

## 2023-02-21 NOTE — CM/SW NOTE
Spoke with Shelby from [facility] who states the [facility] DON Bijan Orosco) informed her that patient is unable to return to the [facility] at this time and that patient needs to go to [facility]. Epifanio Alcantara and Iron Rivas spoke with patient a few days ago to express the benefits of ROSLYN and informed her that her apartment will be held for her while at [facility]. Iron Rivas is not in today to provide this decision to patient. Met with patient, who was alert and oriented, to discuss discharge planning. Informed her that she is unable to return to [facility] at this time. She was disappointed and requested to speak from someone from [facility]. She was agreeable to ROSLYN at [facility]. She did not want referrals sent anywhere else. Spoke with Shelby from [facility] and requested someone reach out to patient today per her request.     Sent referral in aidin. Reserved TH. Requested private room. PASRR completed, letter uploaded to [facility]. SW will remain available. Addendum 1:50pm  Informed by NP that patient is medically cleared for discharge. Spoke with Epifanio Alcantara from [facility] who confirmed private room/bed available today. Spoke with [facility] and scheduled  for 4pm today. PCS form completed. RN updating patient with discharge time. SW will remain available.      [facility]  [phone]    [facility]  [phone] or [facility]  Discharge Planner  [phone]

## 2023-02-21 NOTE — PLAN OF CARE
Assumed care of pt this evening. Rate-controlled afib per tele. CPAP overnight. No c/o pain. Q2hr turns. External catheter maintained w/ adequate UOP. Pt updated on POC. Problem: Diabetes/Glucose Control  Goal: Glucose maintained within prescribed range  Description: INTERVENTIONS:  - Monitor Blood Glucose as ordered  - Assess for signs and symptoms of hyperglycemia and hypoglycemia  - Administer ordered medications to maintain glucose within target range  - Assess barriers to adequate nutritional intake and initiate nutrition consult as needed  - Instruct patient on self management of diabetes  Outcome: Progressing     Problem: Patient/Family Goals  Goal: Patient/Family Long Term Goal  Description: Patient's Long Term Goal: DC home with no wheezing or SOB    Interventions:  -scheduled nebs  -IV solumedrol  -PRN O2  -IV antibiotics  -speech eval  - See additional Care Plan goals for specific interventions  Outcome: Progressing  Goal: Patient/Family Short Term Goal  Description: Patient's Short Term Goal: pain management    Interventions:   - PRN extra strength tylenol  -PRN tramadol  -lidocaine patch  -hot packs  -repositioning    - See additional Care Plan goals for specific interventions  Outcome: Progressing     Problem: CARDIOVASCULAR - ADULT  Goal: Maintains optimal cardiac output and hemodynamic stability  Description: INTERVENTIONS:  - Monitor vital signs, rhythm, and trends  - Monitor for bleeding, hypotension and signs of decreased cardiac output  - Evaluate effectiveness of vasoactive medications to optimize hemodynamic stability  - Monitor arterial and/or venous puncture sites for bleeding and/or hematoma  - Assess quality of pulses, skin color and temperature  - Assess for signs of decreased coronary artery perfusion - ex.  Angina  - Evaluate fluid balance, assess for edema, trend weights  Outcome: Progressing  Goal: Absence of cardiac arrhythmias or at baseline  Description: INTERVENTIONS:  - Continuous cardiac monitoring, monitor vital signs, obtain 12 lead EKG if indicated  - Evaluate effectiveness of antiarrhythmic and heart rate control medications as ordered  - Initiate emergency measures for life threatening arrhythmias  - Monitor electrolytes and administer replacement therapy as ordered  Outcome: Progressing     Problem: RESPIRATORY - ADULT  Goal: Achieves optimal ventilation and oxygenation  Description: INTERVENTIONS:  - Assess for changes in respiratory status  - Assess for changes in mentation and behavior  - Position to facilitate oxygenation and minimize respiratory effort  - Oxygen supplementation based on oxygen saturation or ABGs  - Provide Smoking Cessation handout, if applicable  - Encourage broncho-pulmonary hygiene including cough, deep breathe, Incentive Spirometry  - Assess the need for suctioning and perform as needed  - Assess and instruct to report SOB or any respiratory difficulty  - Respiratory Therapy support as indicated  - Manage/alleviate anxiety  - Monitor for signs/symptoms of CO2 retention  Outcome: Progressing

## 2023-02-21 NOTE — PLAN OF CARE
Assumed care of pt at 0730. A/ox4, mild tremors present intermittently in hands/arms. Has numbness, tingling, and decreased sensation in BUE and BLE. On rm air, remaining . Afib w/ rates controlled on tele. Reports pain 5/10 in left flank, which pt states she, \"thinks is from coughing\". Breath sounds diminished upon auscultation. Reports productive cough w/ moderate amount of think/thin green/yellow sputum. Miconazole powder applied to pt's right flank & right pannus after cleansing. Right BKA.     Discussed POC w/ pt.     1400: called nelda will for report, report given to pramod    Problem: Diabetes/Glucose Control  Goal: Glucose maintained within prescribed range  Description: INTERVENTIONS:  - Monitor Blood Glucose as ordered  - Assess for signs and symptoms of hyperglycemia and hypoglycemia  - Administer ordered medications to maintain glucose within target range  - Assess barriers to adequate nutritional intake and initiate nutrition consult as needed  - Instruct patient on self management of diabetes  Outcome: Progressing     Problem: Patient/Family Goals  Goal: Patient/Family Long Term Goal  Description: Patient's Long Term Goal: DC home with no wheezing or SOB    Interventions:  -scheduled nebs  -IV solumedrol  -PRN O2  -IV antibiotics  -speech eval  - See additional Care Plan goals for specific interventions  Outcome: Progressing  Goal: Patient/Family Short Term Goal  Description: Patient's Short Term Goal: pain management    Interventions:   - PRN extra strength tylenol  -PRN tramadol  -lidocaine patch  -hot packs  -repositioning    - See additional Care Plan goals for specific interventions  Outcome: Progressing     Problem: CARDIOVASCULAR - ADULT  Goal: Maintains optimal cardiac output and hemodynamic stability  Description: INTERVENTIONS:  - Monitor vital signs, rhythm, and trends  - Monitor for bleeding, hypotension and signs of decreased cardiac output  - Evaluate effectiveness of vasoactive medications to optimize hemodynamic stability  - Monitor arterial and/or venous puncture sites for bleeding and/or hematoma  - Assess quality of pulses, skin color and temperature  - Assess for signs of decreased coronary artery perfusion - ex.  Angina  - Evaluate fluid balance, assess for edema, trend weights  Outcome: Progressing  Goal: Absence of cardiac arrhythmias or at baseline  Description: INTERVENTIONS:  - Continuous cardiac monitoring, monitor vital signs, obtain 12 lead EKG if indicated  - Evaluate effectiveness of antiarrhythmic and heart rate control medications as ordered  - Initiate emergency measures for life threatening arrhythmias  - Monitor electrolytes and administer replacement therapy as ordered  Outcome: Progressing     Problem: RESPIRATORY - ADULT  Goal: Achieves optimal ventilation and oxygenation  Description: INTERVENTIONS:  - Assess for changes in respiratory status  - Assess for changes in mentation and behavior  - Position to facilitate oxygenation and minimize respiratory effort  - Oxygen supplementation based on oxygen saturation or ABGs  - Provide Smoking Cessation handout, if applicable  - Encourage broncho-pulmonary hygiene including cough, deep breathe, Incentive Spirometry  - Assess the need for suctioning and perform as needed  - Assess and instruct to report SOB or any respiratory difficulty  - Respiratory Therapy support as indicated  - Manage/alleviate anxiety  - Monitor for signs/symptoms of CO2 retention  Outcome: Progressing

## 2023-06-06 NOTE — PATIENT INSTRUCTIONS
(Z51.89) Encounter for medication adjustment  (primary encounter diagnosis)    (L97.521) Ulcer of toe, left, limited to breakdown of skin ([address] uncontrolled  (Z86.14) History of MRSA infection  Plan: Doxycycline 100 bid           HHN            F/U  3 wee Patient is calling back to let you know Dr Esau Cook does not participate with his insurance  Asking if you can recommend another doctor      Please advise

## 2024-03-11 NOTE — TELEPHONE ENCOUNTER
I saw and evaluated the patient. I personally obtained the key and critical portions of the history and physical exam or was physically present for key and critical portions performed by the resident/student. I reviewed the resident/student's documentation and discussed the patient with the resident/student. I agree with the resident/student's medical decision making as documented in the note with the exception/addition of the following:      Carol Bashir is a 65 y.o. male on hospital day 0 with past medical history as per housestaff note who presents with gradual worsening of increased shortness of breath/dyspnea on exertion/orthopnea which leads to him having a nonproductive cough when he lays back.  He denies any chest pain or abdominal pain but does feel like he has become more swollen.  Despite feeling like he has become more swollen he does feel that he may have lost weight over this time because he has not been eating as well. He does relate that his urine output has been gradually dwindling over the past month or so.  He does take his blood pressure at home and a few days ago his blood pressure was lower with systolics in 80s and diastolics in the 50s so he started drinking salt water throughout the day to help bump his blood pressure as well as hold his hydralazine.  He continued his other vasoactive medications to his knowledge including his nifedipine, Imdur, carvedilol, torsemide.  Of note he has losartan stopped as an outpatient on recent outpatient visit he denies any significant productive cough or hemoptysis, no congestion, no fevers/chills, no nausea/vomiting.   He states he is frustrated that he keeps coming in with similar complaints and just wants to start on dialysis if it stops him from having to come and get admitted to the hospital repeatedly.        Objective     Exam     Vitals:    03/11/24 1200 03/11/24 1215 03/11/24 1230 03/11/24 1245   BP: 168/78      BP Location:      Pt scheduled on below for cpe and recently had labs done. Pt wants to know if there is anything else she should have done. Please advise    [facility] to [facility]. Pt aware to get labs done no sooner than 2 weeks prior to the appt.   Pt aware to fast.  No call b   Patient Position:       Pulse: 63 64 60 61   Resp: 17 19 20 14   Temp:       TempSrc:       SpO2: 96% 94% 97% 95%   Weight:       Height:          Intake/Output last 3 shifts:  No intake/output data recorded.    Physical Exam  Vitals reviewed.   Constitutional:       General: He is not in acute distress.     Appearance: Normal appearance. He is not ill-appearing, toxic-appearing or diaphoretic.      Comments: Breathing smooth and calm.  Patient is alert and oriented x 3.   HENT:      Head: Normocephalic and atraumatic.      Mouth/Throat:      Mouth: Mucous membranes are moist.   Eyes:      Extraocular Movements: Extraocular movements intact.      Pupils: Pupils are equal, round, and reactive to light.   Neck:      Comments: No appreciable JVD  Cardiovascular:      Rate and Rhythm: Normal rate and regular rhythm.      Pulses: Normal pulses.      Heart sounds: Murmur (2 out of 6 systolic murmur heard greatest over the left upper sternal border) heard.      No friction rub. No gallop.   Pulmonary:      Effort: Pulmonary effort is normal.      Breath sounds: No wheezing, rhonchi or rales.      Comments: Breath sounds diminished over right side.  I did not appreciate crackles  Abdominal:      General: Bowel sounds are normal. There is no distension.      Palpations: Abdomen is soft.      Tenderness: There is no abdominal tenderness. There is no guarding or rebound.   Musculoskeletal:      Cervical back: No rigidity.      Right lower leg: Edema present.      Left lower leg: Edema present.      Comments: Approximately 1+ bilateral pitting edema in bilateral lower extremities   Lymphadenopathy:      Cervical: No cervical adenopathy.   Skin:     General: Skin is warm and dry.   Neurological:      General: No focal deficit present.      Mental Status: He is alert and oriented to person, place, and time.      Comments: No asterixis noted   Psychiatric:         Mood and Affect: Mood normal.         Behavior: Behavior normal.  "           Medications   aspirin, 81 mg, oral, Daily  azaTHIOprine, 25 mg, oral, Daily  calcitriol, 0.25 mcg, oral, Daily  carvedilol, 37.5 mg, oral, BID  cholecalciferol, 1,000 Units, oral, Daily  cinacalcet, 30 mg, oral, Daily  heparin (porcine), 5,000 Units, subcutaneous, q8h  [Held by provider] hydrALAZINE, 100 mg, oral, TID  isosorbide mononitrate ER, 60 mg, oral, Daily  multivitamin with minerals, 1 tablet, oral, Daily  NIFEdipine ER, 60 mg, oral, BID  [START ON 3/12/2024] pantoprazole, 40 mg, oral, Daily before breakfast  pravastatin, 40 mg, oral, Nightly  predniSONE, 5 mg, oral, Daily  sodium zirconium cyclosilicate, 5 g, oral, Daily  sulfamethoxazole-trimethoprim, 1 tablet, oral, BID  tacrolimus, 1.5 mg, oral, q12h ZOË       PRN medications: docusate sodium, ondansetron       Labs     All new labs reviewed:  some of the basic labs as follows -     Results from last 7 days   Lab Units 03/11/24  0431 03/07/24  1202 03/05/24  0932   WBC AUTO x10*3/uL 2.5* 2.3* 2.4*  2.5*   HEMOGLOBIN g/dL 8.0* 8.3* 7.9*  8.1*   HEMATOCRIT % 23.1* 26.5* 24.6*  26.0*   PLATELETS AUTO x10*3/uL 88* 114* 115*  108*   NEUTROS PCT AUTO % 56.5  --  51.2   LYMPHO PCT MAN %  --   --  27.7   LYMPHS PCT AUTO % 23.0  --  26.8   MONO PCT MAN %  --   --  5.9   MONOS PCT AUTO % 14.9  --  14.8   EOSINO PCT MAN %  --   --  4.2   EOS PCT AUTO % 2.4  --  3.6          Results from last 72 hours   Lab Units 03/11/24  0431   SODIUM mmol/L 142   POTASSIUM mmol/L 5.6*   CHLORIDE mmol/L 109*   CO2 mmol/L 26   BUN mg/dL 56*   CREATININE mg/dL 5.08*     Results from last 72 hours   Lab Units 03/11/24  0431   ALK PHOS U/L 47   AST U/L 45*   ALT U/L 44   BILIRUBIN TOTAL mg/dL 0.4   ALBUMIN g/dL 3.4   PROTEIN TOTAL g/dL 6.0*   LIPASE U/L 9     Results from last 72 hours   Lab Units 03/11/24  0431   GLUCOSE mg/dL 169*         No results found for: \"TR1\"  Lab Results   Component Value Date    URINECULTURE CANCELED 07/19/2023    BLOODCULT No growth at 4 " days -  FINAL REPORT 10/19/2023    BLOODCULT No growth at 4 days -  FINAL REPORT 10/19/2023    BLOODCULT  09/19/2023     No Growth at 1 days~No Growth at 2 days~No Growth at 3 days~NO GROWTH at 4 days - FINAL REPORT    BLOODCULT  09/19/2023     No Growth at 1 days~No Growth at 2 days~No Growth at 3 days~NO GROWTH at 4 days - FINAL REPORT            Imaging   XR chest 1 view  Narrative: Interpreted By:  Octavio Henriquez,  and Jamie Mchugh   STUDY:  XR CHEST 1 VIEW;  3/11/2024 4:09 am      INDICATION:  Signs/Symptoms:chest paihn.      COMPARISON:  CHEST X-RAY 01/16/2024.      ACCESSION NUMBER(S):  [id number]      ORDERING CLINICIAN:  BALDOMERO RICK      FINDINGS:  AP radiograph of the chest was provided.      Vascular stent overlies the left axilla.      CARDIOMEDIASTINAL SILHOUETTE:  Cardiomediastinal silhouette is stable in size and configuration,  enlarged.      LUNGS:  Patchy airspace opacities are visualized throughout the right lung.  There is obscuration of the right costophrenic angle. There are  prominent perihilar interstitial markings bilaterally. Probable left  pleural effusion also noted      ABDOMEN:  No remarkable upper abdominal findings.      BONES:  No acute osseous changes.      Impression: 1. Extensive patchy airspace opacities throughout the right lung with  obscuration of the right costophrenic angle. There are prominent  perihilar interstitial markings bilaterally. Findings in the setting  of cardiomegaly likely represent right pleural effusion and pulmonary  edema. Probable small left pleural effusion also noted. Multifocal  infection can not be excluded.      I personally reviewed the images/study and I agree with the findings  as stated by resident physician Dr. Jeison Laws . This study  was interpreted at [facility],  [place], [place].      MACRO:  Critical Finding:  See findings. Notification was initiated on  3/11/2024 at 4:19 am by  Jeison  Jamie Laws.  (**-OCF-**)  Instructions:      Signed by: Octavio Henriquez 3/11/2024 5:17 AM  Dictation workstation:   [id number]     No results found for this or any previous visit from the past 1095 days.     Encounter Date: 01/16/24   ECG 12 lead   Result Value    Ventricular Rate 56    Atrial Rate 56    UT Interval 184    QRS Duration 134    QT Interval 430    QTC Calculation(Bazett) 414    P Axis 88    R Axis -26    T Axis 22    QRS Count 9    Q Onset 218    P Onset 126    P Offset 178    T Offset 433    QTC Fredericia 420    Narrative    See ED provider note for full interpretation and clinical correlation  Confirmed by Zac Matamoros ([id number]) on 1/18/2024 2:14:45 AM      EKGs, normal sinus rhythm with no acute ischemic changes.  Initial EKG with considerable artifact from wandering baseline/movement    Assessment and Plan     Shortness of breath: Likely related to worsening volume overload in the setting of declining renal function.  Patient is slated for outpatient dialysis fistula placement later this month.  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  Recent kappa/lambda ratio was normal despite individuals being elevated.  -Patient was given 4 mg IV Bumex in the emergency room.  Will monitor for response  -Continue strict ins and outs and daily weights.  -Transplant nephrology consulted.  -For now we will continue home immunosuppression regimen (tacrolimus, azathioprine, and prednisone).  Will adjust dosing with the guidance of the transplant service  -Continue home Sensipar and vitamin D3/calcitriol  -Trend renal function panel.  Started on Lokelma.  Check p.m. potassium given it was mildly elevated in the setting of decreased urine output with worsening renal function.  Hopefully Bumex will bring that down.  -Check postvoid residuals    Cardiovascular: Blood pressure rising in the emergency room to the point is now elevated.  EKG without ischemic change and troponin is  negative x 2.  Recent echocardiogram from 11/23 showed preserved EF with severe LVH, mild to moderate aortic regurgitation  -Resume home carvedilol, nifedipine, and Imdur  -Use hydralazine as needed  -Continue statin and aspirin  -May need to repeat echocardiogram to assess for any further worsening of valvular dysfunction.  Overall at this presentation seems more like it is related to decreased urine output from poor renal function    Diabetes mellitus: A1c from 1/24 is 7.7  -Continue sliding scale insulin    GI:  -Continue PPI  -Bowel care    DVT prophylaxis    Physical therapy/Occupational Therapy when appropriate    Daniel Peña MD     Of note the above was done with Dragon dictation system.  Note was proofread to minimize errors.

## 2024-07-25 NOTE — TELEPHONE ENCOUNTER
LOV: 9/28/18 TB  FOV:12/21/18   LAST RX:Tramodol:09/07/18 50 mg take 1 tab every 6 hours as needed 120 tabs 0 refills   Cyclobenzaprine:5/22/18 10 mg  Take 1 tab as needed 30 tabs 2 refills   LAST LABS:9/13/18 A1C,cmp,vit b12  PER PROTOCOL: to provider intact

## 2024-10-28 ENCOUNTER — TELEPHONE (OUTPATIENT)
Dept: OTHER | Age: 79
End: 2024-10-28

## 2024-11-01 ENCOUNTER — TELEPHONE (OUTPATIENT)
Dept: OTHER | Age: 79
End: 2024-11-01

## 2024-11-04 ENCOUNTER — TELEPHONE (OUTPATIENT)
Dept: OTHER | Age: 79
End: 2024-11-04

## 2025-02-28 ENCOUNTER — MED REC SCAN ONLY (OUTPATIENT)
Dept: INTERNAL MEDICINE CLINIC | Facility: CLINIC | Age: 80
End: 2025-02-28

## (undated) NOTE — MR AVS SNAPSHOT
[address], [address] [place] [id number] [phone]               Thank you for choosing us for your health care visit with Brian Castillo MD.  We are glad to serve you and happy to provide you with this Plan: Start Doxycycline 100 mg bid           To be seen by Podiatry in one week           VNA F/U if needed           Follow closely.    (I10) Essential hypertension, controlled  Plan: CPM    (E11.9) Controlled type 2 diabetes mellitus without complication Take 1 capsule (50,000 Units total) by mouth every 30 (thirty) days.    Commonly known as:  DRISDOL/VITAMIN D2           * FLUoxetine HCl 20 MG Caps   Take one tablet along with the prozac 40 mg daily for a total dose of 60 mg daily for OCD   Commonly known Take 1 tablet by mouth 2 (two) times daily.            * Oyster Shell Calcium/D 500-200 MG-UNIT Tabs   TAKE ONE TABLET BY MOUTH 2 TIMES A DAY           Pravastatin Sodium 20 MG Tabs   TAKE ONE TABLET BY MOUTH AT BEDTIME   Commonly known as:  PRAVACHOL

## (undated) NOTE — IP AVS SNAPSHOT
Patient Demographics     Address  [address] [place] [address] Phone  [phone] (Home) *Preferred*  [phone] [facility]) E-mail Address  [email or web address]      Emergency Contact(s)     Name Relation Home Work Mobile    Wyatt Irasema Moss MD         busPIRone HCl 10 MG Tabs  Commonly known as:  BUSPAR      Take 20 mg by mouth 2 (two) times daily.           clotrimazole 1 % Crea  Commonly known as:  LOTRIMIN      APPLY TO AFFECTED AREA 2 TIMES A DAY - USE WITH HYDROCORTISONE C Take 500-1,000 mg by mouth every 8 (eight) hours as needed for Fever.           MAPAP 500 MG Tabs  Generic drug:  acetaminophen      TAKE 1 OR 2 TABLETS 3 TIMES A DAY FOR PAIN   Irasema Moss MD         mupirocin 2 % Oint  Commonly known as:  Arnoldoine [id number] FLUoxetine HCl (PROZAC) cap 60 mg 06/06/19 0944 Given      [id number] Pantoprazole Sodium (PROTONIX) EC tab 20 mg 06/06/19 0518 Given      [id number] Piperacillin Sod-Tazobactam So (ZOSYN) 4.5 g in dextrose 5 % 100 mL ADD-vantage 06/05/19 1713 New [id number] pregabalin (LYRICA) cap 100 mg 06/05/19 2019 Given      [id number] pregabalin (LYRICA) cap 100 mg 06/06/19 0945 Given      [id number] traMADol HCl (ULTRAM) tab 50 mg 06/05/19 2308 Given      [id number] traMADol HCl (ULTRAM) tab 50 mg 06/06/19 1057 Microbiology Results (All)     Procedure Component Value Units Date/Time    Blood Culture FREQ X 2 [[id number]] Collected:  06/01/19 1044    Order Status:  Completed Lab Status:  Final result Updated:  06/06/19 1100    Specimen:  Blood,peripheral      Blood Location [facility] Attending Luis Leyva, [address] [facility] Day # 0 PCP Tamy Vega MD     Chief Complaint:[AR.1] fevers, cough, PNa[AR.2]    History of Present Illness: Keny Lucas is a 74year old female with PMhx of anxiety, de smokeless tobacco. She reports that she does not drink alcohol or use drugs.     Family History:   Family History   Problem Relation Age of Onset   • Other (cadiac arrest) Paternal Grandmother    • Other (cardiac arrest) Paternal Grandfather    • Cancer Fat 90 tablet Rfl: 1   MUPIROCIN 2 % External Ointment APPLY TO OPEN AREA ON LEG 2 TIMES A DAY AS NEEDED Disp: 22 g Rfl: 10   JANUVIA 50 MG Oral Tab TAKE ONE TABLET BY MOUTH EVERY DAY Disp: 30 tablet Rfl: 10   OMEPRAZOLE 20 MG Oral Capsule Delayed Release TAKE TraZODone HCl 50 MG Oral Tab Take 50 mg by mouth nightly. Disp:  Rfl:        Review of Systems:   A comprehensive 14 point review of systems was completed. Pertinent positives and negatives noted in the HPI.     Physical Exam:    /72   Pulse 89 1. Continue home meds  5. Dyslipidemia  1. statin  6. Morbid Obesity  1. BMI 44  2. Advised weight loss  7.  DM2  1. 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 Disp:  Rfl:    Calcium Carbonate-Vitamin D (OYSTER SHELL CALCIUM/D) 500-200 MG-UNIT Oral Tab Take 1 tablet by mouth daily.  Disp:  Rfl:    F •  aspirin EC tab 81 mg, 81 mg, Oral, Daily  •  FLUoxetine HCl (PROZAC) cap 60 mg, 60 mg, Oral, Daily  •  losartan Potassium (COZAAR) tab 50 mg, 50 mg, Oral, Daily  •  pregabalin (LYRICA) cap 100 mg, 100 mg, Oral, TID  •  Pravastatin Sodium (PRAVACHOL) tab O2 requirement: 5L   Wt Readings from Last 3 Encounters:  06/01/19 : (!) 300 lb 0.7 oz (136.1 kg)  05/20/19 : 300 lb (136.1 kg)  06/06/18 : (!) 304 lb 3.8 oz (138 kg)       I/O last 3 completed shifts:  In: -   Out: 50 [Urine:50]  I/O this shift:   In: 700 PACS.  I agree with the radiology interpretation except where noted. ASSESSMENT/PLAN:  1. Acute hypoxemic respiratory failure: 2/2 LLL PNA complicated by bronchospasm  -continue zosyn  -wean O2 as able  -IV steroids  -BD protocol   2.  Pneumonia:  -RV • Disorder of thyroid    • Environmental allergies     dust mold    • Essential hypertension    • Food allergy     juniper berries   • Gout    • Hearing impairment    • High blood pressure    • High cholesterol    • History of right below knee amputation ( How much help from another person does the patient currently need. ..   -   Moving to and from a bed to a chair (including a wheelchair)?: A Little   -   Need to walk in hospital room?: Total   -   Climbing 3-5 steps with a railing?: Total       AM-PAC Scor Frequency (Obs): 3x/week    CURRENT GOALS   Goal #1 Patient is able to demonstrate supine - sit EOB @ level: modified independent      Goal #2 Patient is able to demonstrate transfers EOB to/from Chair/Wheelchair at assistance level: supervision      Goal Acute bronchospasm    Sepsis, due to unspecified organism (HCC)[LD.2]      Past Medical History[LD.1]  Past Medical History:   Diagnosis Date   • Acute osteomyelitis of right hand ([facility]            GLOBAL EXP 9-30-16 / RIGHT THUMB / BAM  5/31/2016   • Anx AM-PAC ‘6-Clicks’ Inpatient Daily Activity Short Form  How much help from another person does the patient currently need…[LD.1]  -   Putting on and taking off regular lower body clothing?: A Lot  -   Bathing (including washing, rinsing, drying)?: A Lot  - mobility. These deficits impact the patient’s ability to participate in BADL, instrumental activities of daily living, rest and sleep, work, leisure and social participation. Pt is highly motivated to return to her [facility] apt.  Recommend HHOT to assess for equ Author:  Noelle Stevenson Service:  Rehab Author Type:  Occupational Therapist    Filed:  6/5/2019  6:11 AM Date of Service:  6/4/2019  3:30 PM Status:  Signed    :  Noelle Stevenson (Occupational Therapist)       OCCUPATIONAL THERAPY EVALUATION - INP • AMPUTATION LOW LEG THRU TIB/FIB  7/2009    R leg below knee   • AMPUTATION METATARSAL+TOE,SINGLE Left     left great toe    • APPENDECTOMY     • CHOLECYSTECTOMY     • FINGER AMPUTATION/ EXTRA DIGIT REMOVAL Right 6/2/2016    Performed by Elysa Hamman, MD ACTIVITIES OF DAILY LIVING ASSESSMENT  AM-PAC ‘6-Clicks’ Inpatient Daily Activity Short Form  How much help from another person does the patient currently need…  -   Putting on and taking off regular lower body clothing?: A Lot  -   Bathing (including wash with the following performance deficits: R heel pain, BADL/IADL dysfunction, decreased endurance, balance, strength, ROM and functional mobility.  These deficits impact the patient’s ability to participate in BADL, instrumental activities of daily living, r Patient will transfer from bed to chair:  with modified independent  Patient will transfer from sit to supine:  with modified independent  Patient will transfer from supine to sit:  with modified independent  Patient will transfer to bedside commode:  with

## (undated) NOTE — IP AVS SNAPSHOT
[address]            (For Outpatient Use Only) Initial Admit Date: 6/1/2019   Inpt/Obs Admit Date: Inpt: 6/1/19 / Obs: N/A   Discharge Date:    Ariana Duke:  [de-identified]   MRN: [de-identified]   CSN: [id number]   CEID: [id number] Subscriber ID:  Pt Rel to Subscriber:    Hospital Account Financial Class: Medicare    June 6, 2019

## (undated) NOTE — ED AVS SNAPSHOT
Lindsaykelsey Chen   MRN: [id number]    Department:  [facility] Emergency Department   Date of Visit:  6/28/2019           Disclosure     Insurance plans vary and the physician(s) referred by the ER may not be covered by your plan.  Please contact you tell this physician (or your personal doctor if your instructions are to return to your personal doctor) about any new or lasting problems. The primary care or specialist physician will see patients referred from the [facility] Emergency Department.  Darlin Spring

## (undated) NOTE — IP AVS SNAPSHOT
Patient Demographics     Address  [address] [place] [address] Phone  [phone] (Home)  [phone] (Mobile) *Preferred* E-mail Address  [email or web address]      Emergency Contact(s)     Name Relation Home Work [address] [place] [address] Take 1 tablet (10 mg total) by mouth 2 (two) times daily as needed for Muscle spasms. Trey Wheat MD         FLUoxetine HCl 20 MG Caps  Commonly known as:  PROZAC  Next dose due:  1/1 morning      Take 20 mg by mouth every morning.           FLUoxetin Take 1 tablet by mouth 2 (two) times daily. Sweta Bolton MD         Pravastatin Sodium 20 MG Tabs  Commonly known as:  PRAVACHOL  Next dose due:  12/31 night      Take 20 mg by mouth nightly.           Prazosin HCl 5 MG Caps  Commonly known as:  Eulene Spray [id number] Zolpidem Tartrate (AMBIEN) tab 5 mg 01/01/20 2148 Given      [id number] acetaminophen (TYLENOL) tab 650 mg 01/01/20 2200 Given      [id number] acetaminophen (TYLENOL) tab 650 mg 01/02/20 0629 Given      [id number] allopurinol (ZYLOPRIM) tab 300 mg :  Kaden Zimmerman MD (Physician)         [facility]  [facility] and [facility]     [facility] Patient Status:  Observation    1945 MRN [id number]   [facility] 3NE-A Attending Kaden Zimmerman MD   Hosp Day # 0 PCP Lefty Santacruz • FINGER AMPUTATION/ EXTRA DIGIT REMOVAL Right 6/2/2016    Performed by Rodolfo Swift MD at [facility] MAIN OR   • TONSILLECTOMY         Social History:  reports that she has never smoked.  She has never used smokeless tobacco. She reports that she does not drink Tab, Take 1 tablet by mouth 2 (two) times daily. , Disp: 60 tablet, Rfl: 11  OMEPRAZOLE 20 MG Oral Capsule Delayed Release, TAKE ONE CAPSULE BY MOUTH EVERY MORNING, Disp: 30 capsule, Rfl: 10  GLIPIZIDE ER 10 MG Oral Tablet 24 Hr, TAKE ONE TABLET BY MOUTH EV Wt 292 lb (132.5 kg)   SpO2 100%   BMI 43.12 kg/m²   General: No acute distress. Alert and oriented x 3. HEENT: left frontal scalp ecchymosis with hematoma, left eye ecchymosis and swelling. Moist mucous membranes. Neck: No lymphadenopathy. No JVD.  No Plan of care discussed with ED physician    Jermain Peres MD  12/28/2019[FA. 1]                        Electronically signed by Rox Ruiz MD on 12/28/2019 12:30 AM   Attribution Capps    FA. 1 - Rox Ruiz MD on 12/28/2019 12:16 AM Lace+ Score: 66[AR.2]  59-90 High Risk  29-58 Medium Risk  0-28   Low Risk         TCM Follow-Up Recommendation:  LACE > 58: High Risk of readmission after discharge from the hospital.    Procedures during hospitalization:   ?  None    Incidental or signifi Commonly known as:  CLARITIN      Take 10 mg by mouth daily. Refills:  0     losartan Potassium 50 MG Tabs  Commonly known as:  COZAAR      Take 50 mg by mouth every morning.    Refills:  0     MAPAP 500 MG Caps  Generic drug:  Acetaminophen      Take 1,0 [address]  [phone]    In 1 week      Appointments for Next 30 Days 1/1/2020 - 1/31/2020    None[AR.2]          Vital signs:[AR.1]  Temp:  [98.1 °F (36.7 °C)-98.5 °F (36.9 °C)] 98.1 °F (36.7 °C)  Pulse:  [75-83] 75  Resp:  Darya Sandra Concussion with no loss of consciousness    Traumatic hematoma of forehead, subsequent encounter    Foot pain, left      Past Medical History  Past Medical History:   Diagnosis Date   • Acute osteomyelitis of right hand ([facility] [address]            GLOBAL EXP 9-30-1 Dynamic Sitting: Fair           Static Standing: Fair -  Dynamic Standing: Fair -    ACTIVITY TOLERANCE                         O2 WALK                    AM-PAC '6-Clicks' INPATIENT SHORT FORM - BASIC MOBILITY  How much difficulty does the patient current supportive living community where she can obtain additional help as needed. Pt reports feeling close to baseline level of mobility. Patient End of Session: Up in chair;Needs met;Call light within reach; With [facility] staff;RN aware of session/findings; All p Room Number: 0088/4536-B  Session: 1   Number of Visits to Meet Established Goals: 3    Presenting Problem: concussion    History related to current admission: Admitted 12/27 from [address] [place] [address] after she fell out of her WC, striking her hea • AMPUTATE METACARPAL+FINGER Left     left index finger   • AMPUTATE METACARPAL+FINGER Right     tip of right middle finger    • AMPUTATION LOW LEG THRU TIB/FIB  7/2009    R leg below knee   • AMPUTATION METATARSAL+TOE,SINGLE Left     left great toe    • A wheelchair to commode over toilet CGA. Min (A) for peter care with toileting; pt uses toileting aide at home for improved reach and able to simulate with mod (I). Transfer to wheelchair CGA. transfer wheelchair <> EOB CGA.  Pt with no questions/concerns at t judgement/safety-met 1/2     UE Exercise Program Goal  Patient will be supervision with bilateral AROM HEP (home exercise program). -ongoing   [SH.1]     Attribution Key    SH. 1 - Abrahan Sun OT on 1/2/2020  3:27 PM  SH. 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 : A Little  -   Putting on and taking off regular upper body clothing?: None  -   Taking care of personal grooming such as brushing t training;Functional transfer training; Endurance training;Patient/Family education;Patient/Family training;Equipment eval/education; Compensatory technique education  Rehab Potential : Fair  Frequency (Obs): 3-5x/week      OT Goals:   ADL Goals   Patient mike - See additional Care Plan goals for specific interventions    Patient/Family Short Term Goal   (Resolved)     Interdisciplinary Completed    Description:  Patient's Short Term Goal: for the redness in my skin folds to heal    Interventions:   - Nystatin

## (undated) NOTE — LETTER
Date: 11/19/2021  Patient name: Harris Form  YOB: 1945  Medical Record Number: [id number]  Coverage: Payor: MEDICARE / Plan: MEDICARE PART A&B / Product Type: *No Product type* /   Insurance ID: [id number]  Patient Address: [facility] Orders:  Miscellaneous orders: Home health care nurse for wound care    Care Summary:  Care Summary: Discussed Plan of Care at beside with patient. Patient verbally acknowledges understanding of all instructions and all questions were answered.       Follow

## (undated) NOTE — LETTER
06/30/20        [address] [facility] [address]  [place] [address]      Dear Lesley Marquis records indicate that you have outstanding FASTING lab work and or testing that was ordered for you and has not yet been completed:  Please

## (undated) NOTE — MR AVS SNAPSHOT
[facility] [address] [address] [phone]               Thank you for choosing us for your health care visit with Willam Warren MD.  We are glad to serve you and happy to provide you with this summar OP REFERRAL PAIN MANGEMENT [[id number] CPT(R)]  Order #:  [id number]         **REFERRAL REQUEST**    Your physician has referred you to a specialist.  Your physician or the clinic staff will provide you with the phone number you should call to schedule your APPLY TO AFFECTED AREA 2 TIMES A DAY - USE WITH HYDROCORTISONE CREAM   Commonly known as:  LOTRIMIN           cyanocobalamin 1000 MCG/ML Soln   Inject 1 mL (1,000 mcg total) into the muscle every 30 (thirty) days.    Commonly known as:  VITAMIN B12 * Oyster Shell Calcium/D 500-200 MG-UNIT Tabs   Take 1 tablet by mouth 2 (two) times daily.            * Oyster Shell Calcium/D 500-200 MG-UNIT Tabs   TAKE ONE TABLET BY MOUTH 2 TIMES A DAY           Pravastatin Sodium 20 MG Tabs   TAKE ONE TABLET BY MOUTH

## (undated) NOTE — LETTER
Date: 11/10/2020    Patient Name: Noah Arzate          To Whom it may concern:    Please check patient's BP daily for 1 week. I recently increased her losartan to 100mg daily from 50mg daily and would like to see if her BP improves.  Please send me the

## (undated) NOTE — IP AVS SNAPSHOT
Patient Demographics     Address  [address] [place] [address] Phone  [phone] (Home) *Preferred*  [phone] [facility]) E-mail Address  [email or web address]      Emergency Contact(s)     Name Relation Home Work Mobile    [facility] clotrimazole 1 % Crea  Commonly known as:  LOTRIMIN  Next dose due: Tonight      Apply topically 2 (two) times daily.           Cyclobenzaprine HCl 10 MG Tabs  Commonly known as:  cyclobenzaprine      Take 10 mg by mouth nightly as needed for Muscle spasms Pravastatin Sodium 20 MG Tabs  Commonly known as:  PRAVACHOL  Next dose due: Tonight      Take 20 mg by mouth nightly. Prazosin HCl 5 MG Caps  Commonly known as:  MINIPRESS  Next dose due: Tonight. Take 5 mg by mouth nightly.           prega [id number] Prazosin HCl (MINIPRESS) cap 5 mg 06/05/18 2241 Given      [id number] TraZODone HCl (DESYREL) tab 50 mg 06/05/18 2124 Given      [id number] allopurinol (ZYLOPRIM) tab 300 mg 06/06/18 1022 Given      [id number] aspirin EC tab 81 mg 06/06/18 1022 Giv CPAP Settings (Inpatient)    Flowsheet Row Most Recent Value   Mode  Spontaneous   Interface  Patient provided mask   Flow rate  4 lpm      Lab Results Last 24 Hours    No matching results found     Microbiology Results (All)     Procedure Component Value Past Medical History:  Past Medical History:   Diagnosis Date   • Acute osteomyelitis of right hand ([address]            GLOBAL EXP 9-30-16 / RIGHT THUMB / BAM  5/31/2016   • Anxiety    • 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    Current Outpatient Prescriptions on File Prior to Encounter:  CLOTRIMAZOLE 1 % Exter Tab TAKE ONE TABLET BY MOUTH 2 TIMES A DAY Disp: 60 tablet Rfl: 6   GlipiZIDE ER 10 MG Oral Tablet 24 Hr Take 1 tablet (10 mg total) by mouth once daily.  Disp: 30 tablet Rfl: 5   SITagliptin Phosphate (JANUVIA) 50 MG Oral Tab Take 1 tablet (50 mg total) by Chest and Back: No tenderness or deformity. Abdomen: Soft, nontender, nondistended. Positive bowel sounds. No rebound, guarding or organomegaly. Neurologic: No focal neurological deficits. CNII-XII grossly intact.   Musculoskeletal: Moves all extremities Author:  Naye Palencia MD Service:  (none) Author Type:  Physician    Filed:  6/4/2018  2:52 PM Date of Service:  6/4/2018  2:38 PM Status:  Signed    :  Naye Palencia MD (Physician)     Consult Orders:    1.  IP Consult to Cardiology O Comment: left index finger  No date: AMPUTATE METACARPAL+FINGER Right      Comment: tip of right middle finger   7/2009:  AMPUTATION LOW LEG THRU TIB/FIB      Comment: R leg below knee  No date: AMPUTATION METATARSAL+TOE,SINGLE Left      Comment: left g Abdomen: Soft, non-tender. Extremities: trace LLE edema, erythema. Right BKA. Neurologic: no focal deficits  Skin: Warm and dry.           Telemetry: sinus with PAC's    Laboratories and Data:  Diagnostics:    EKG, 6/4/2018:  ST with PAC's    CXR, 6/4/2 :  Julita Bar PT (Physical Therapist)        PHYSICAL THERAPY TREATMENT NOTE - INPATIENT    Room Number: 7005/1420-X     Session: 1   Number of Visits to Meet Established Goals: 5    Presenting Problem: pneumonia    History related to current \"My sister brought my own wheelchair, so that'll help. \"    Patient’s self-stated goal is to return home.      OBJECTIVE  Precautions: None    WEIGHT BEARING RESTRICTION  Weight Bearing Restriction: None                PAIN ASSESSMENT   Ratin  Location management independently in room. Discussed PT POC and discharge recommendations; pt verbalizes understanding. Patient End of Session: Up in chair;Needs met;Call light within reach;RN aware of session/findings; All patient questions and concerns addre Physician Order: PT Eval and Treat    Presenting Problem: pneumonia  Reason for Therapy: Mobility Dysfunction and Discharge Planning    History related to current admission: Pt is[JM.3 74year old[JM.2] female admitted on 6/4/2018 from[facility]JM.1[facility] Type of Home: Other (Comment) (Supported Living Facility [facility]))                    Lives With: Staff 24 hours  Drives: No  Patient Owned Equipment: Other (Comment) (manual wheelchair)       Prior Level of Divide:[JM.1] Pt reports modified ind -   Climbing 3-5 steps with a railing?: Total       AM-PAC Score:  Raw Score: 13   PT Approx Degree of Impairment Score: 64.91%   Standardized Score (AM-PAC Scale): 36.74   CMS Modifier (G-Code): CL    FUNCTIONAL ABILITY STATUS  Gait Assessment   Gait Assi transfer[JM.1]s[JM.3]. The patient is below baseline and would benefit from skilled inpatient PT to address the above deficits to assist patient in returning to prior to level of function. [JM.1]  Recommend home health PT upon discharge in order to continu Author:  Caryn Dueñas OT Service:  (none) Author Type:  Occupational Therapist    Filed:  6/6/2018 11:46 AM Date of Service:  6/6/2018 11:11 AM Status:  Signed    :  Caryn Dueñas OT (Occupational Therapist)    Related Notes:  Original No • Diabetes ([facility] [address]    • Disorder of thyroid    • Environmental allergies     dust mold    • Essential hypertension    • Food allergy     juniper berries   • Gout    • Hearing impairment    • High blood pressure    • High cholesterol    • History of right bel Patient self-stated goal is to return to supported living facility. OBJECTIVE[RM.1]  Precautions: None  Fall Risk: High fall risk[RM. 2]    WEIGHT BEARING RESTRICTION[RM.1]  Weight Bearing Restriction: None[RM.2]      PAIN ASSESSMENT[RM.1]  Ratin[RM. -   Taking care of personal grooming such as brushing teeth?: A Little  -   Eating meals?: None[RM. 2]    AM-PAC Score:[RM.1]  Score: 16  Approx Degree of Impairment: 53.32%  Standardized Score (AM-PAC Scale): 35.96  CMS Modifier (G-Code): CK[RM.2]    FUNCT OT Discharge Recommendations: Home  OT Device Recommendations: TBD[RM.2]    PLAN   Patient performed at Supervision-- Mod I Level for ADLs and functional mobility. DC pt from IP OT services at this time. [RM.1]                   Attribution Key    RM. 1 - Mc present. Possible left lower lobe pneumonia. If clinical symptoms persist recommend CT. Problem List[RM. 1]  Principal Problem:    Community acquired pneumonia of left lower lobe of lung ([address]  Active Problems:    Leukocytosis    Azotemia Prior Level of Function:  Pt currently living in [facility] w/ access to staff supervision PRN. Pt h/o  R BKA, reports prior level of function as mod independent for functional mobility w/ manual wheelchair.  Pt reports mod I w/ t Fine Motor    WFL      ADDITIONAL TESTS      NEUROLOGICAL FINDINGS      ACTIVITY TOLERANCE   Liters of O2:  4 L   4 L at start of session. ..  Pt taken of[RM.1]f[RM.3] O2 during session, sat: 97%    ACTIVITIES OF DAILY LIVING ASSESSMENT  AM-PAC ‘6-Clicks’ In benefit from skilled inpatient OT to address the above deficits, maximizing patient’s ability to return safely to her prior level of function.     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 No evidence of pulmonary embolus.      2.  Large left lower lobe consolidation along with at No date: APPENDECTOMY  No date: CHOLECYSTECTOMY  No date: [address] SITUATION[RM. 1]  Type of Home: Other (Comment) (Supported Living Facility [facility]))  Home Layout: One level  Lives With: Staff 24 hours    To SENSATION  Pt reports h/o tingling sensations and numbness B UE (mid-forearm level to digits)    Communication: No issues    Behavioral/Emotional/Social: Pt was congenial, appropriate and amenable to evaluation      RANGE OF MOTION AND STRENGTH ASSESSMENT Patient End of Session: Up in chair; Other (Comment); All patient questions and concerns addressed;Call light within reach;RN aware of session/findings; Discussed recommendations with / (In Manual Wheel Chair )[RM.2]    ASSESSMENT Future Appointments        Provider Prashant Solis    6/13/2018 3:15 PM Linh Luna MD [employer], [place] Raffy Dillard [facility] IM    5/13/2019 2:40 PM ANJALI Ortez SP PULMONARY MEDICINE [address] [facility]

## (undated) NOTE — IP AVS SNAPSHOT
Patient Demographics     Address  [address] [place] [address] Phone  [phone] (Home) *Preferred*  [phone] [facility]) E-mail Address  [email or web address]      Emergency Contact(s)     Name Relation Home Work Mobile    Wyatt Apply topically every 12 (twelve) hours as needed. To affected areas, use with hydrocortisone cream.          Cyclobenzaprine HCl 10 MG Tabs  Commonly known as:  cyclobenzaprine      Take 10 mg by mouth nightly as needed for Muscle spasms.           Jerri Zaragoza omeprazole 20 MG Cpdr  Commonly known as:  PRILOSEC  Next dose due:  7/11 am       Take 20 mg by mouth every morning before breakfast.          Oyster Shell Calcium/D 500-200 MG-UNIT Tabs  Next dose due:  7/10 pm       Take 1 tablet by mouth 2 (two [id number] Piperacillin Sod-Tazobactam So (ZOSYN) 4.5 g in dextrose 5 % 100 mL ADD-vantage 07/10/19 0000 New Bag      [id number] Piperacillin Sod-Tazobactam So (ZOSYN) 4.5 g in dextrose 5 % 100 mL ADD-vantage 07/10/19 0842 New Bag      [id number] Pravastatin Patient Weight  141.5 kg (312 lb)  (Abnormal)       CPAP Settings (Inpatient)       Most Recent Value   Mode  Spontaneous   Interface  Patient provided mask   Mask size  -- [own]   O2%  21 %   Flow rate  2 lpm      Lab Results Last 24 Hours    No matching ago while transferring. Denies N/V/D. Denies CP/SOB.      Past Medical History:  Past Medical History:   Diagnosis Date   • Acute osteomyelitis of right hand ([facility] [address]            GLOBAL EXP 9-30-16 / RIGHT THUMB / BAM  5/31/2016   • Anxiety    • Carrier or susp Adhesive Tape             Aspartame                 Clindamycin             DIARRHEA  Lisinopril              Coughing  Other                       Comment:Please see extensive Neurotoxic Medication List in             Media before prescribing medications FLUoxetine HCl 40 MG Oral Cap Take 40 mg by mouth nightly. Disp:  Rfl:    traMADol HCl 50 MG Oral Tab Take 50 mg by mouth every 6 (six) hours as needed for Pain. Disp:  Rfl:    aspirin 81 MG Oral Tab EC Take 81 mg by mouth nightly.  Disp:  Rfl:    Ciproflox Respiratory: Clear to auscultation bilaterally. No wheezes. No rhonchi. Cardiovascular: S1, S2. Regular rate and rhythm. No murmurs, rubs or gallops. Equal pulses. Chest and Back: No tenderness or deformity. Abdomen: Soft, nontender, nondistended.   Pos :  Lisset Nova PT (Physical Therapist)             PHYSICAL THERAPY EVALUATION - INPATIENT     Room Number: 418/418-A  Evaluation Date: 7/10/2019  Type of Evaluation: Initial[AE.1]  Physician Order: PT Eval and Treat    Presenting Problem: acut • AMPUTATION LOW LEG THRU TIB/FIB  7/2009    R leg below knee   • AMPUTATION METATARSAL+TOE,SINGLE Left     left great toe    • APPENDECTOMY     • CHOLECYSTECTOMY     • FINGER AMPUTATION/ EXTRA DIGIT REMOVAL Right 6/2/2016    Performed by Rosalinda Viramontes MD wheelchair, bedside commode, etc.): A Little   -   Moving from lying on back to sitting on the side of the bed?: A Lot[AE.2]   How much help from another person does the patient currently need. ..[AE.1]   -   Moving to and from a bed to a chair (including a In this PT evaluation, the patient presents with the following impairments[AE.1] decreased activity tolerance, safety and functional strength during transfers[AE.2]. Functional outcome measures completed include[AE.1] AMPAC, Elderly mobility scale.   The A AE.1 Li Flannery, PT on 7/10/2019  3:21 PM  AE. 2 - Li Britton, PT on 7/10/2019  4:06 PM                        Occupational Therapy Notes (last 72 hours) (Notes from 7/7/2019  4:13 PM through 7/10/2019  4:13 PM)      Occupational Therapy Note si • History of right below knee amputation ([facility] 10/26/2014   • Hyperlipidemia    • MRSA infection 6/2/2016   • Osteoarthritis    • Osteomyelitis of finger ([facility] 2/25/2015   • Pneumonia due to organism    • Sleep apnea    • Ulcer of finger (HCC)[SH.2] L shoulder 2-/5, R shoulder 3+/5[SH.3]    NEUROLOGICAL FINDINGS                   ACTIVITY TOLERANCE                         O2 SATURATIONS                ACTIVITIES OF DAILY LIVING ASSESSMENT  AM-PAC ‘6-Clicks’ Inpatient Daily Activity Short Form   How mu include[SH.1] AMPAC, ROM, MMT. The AM-COOKIE ' '6-Clicks' Inpatient Daily Activity Short Form was completed and  this patient  is demonstrating a  50% degree impairment in activities of daily living.  Pt currently needs minimum physical assist for transfers an No notes of this type exist for this encounter.      Immunizations     Name Date      INFLUENZA 10/06/17     INFLUENZA 09/26/16     INFLUENZA 09/09/14     INFLUENZA 10/04/13     INFLUENZA 09/18/12     Pneumococcal (Prevnar 13) 10/24/16     Pneumovax 23 12/2

## (undated) NOTE — ED AVS SNAPSHOT
Jn Pedroza   MRN: [id number]    Department:  [facility] Emergency Department   Date of Visit:  12/27/2019           Disclosure     Insurance plans vary and the physician(s) referred by the ER may not be covered by your plan.  Please contact yo tell this physician (or your personal doctor if your instructions are to return to your personal doctor) about any new or lasting problems. The primary care or specialist physician will see patients referred from the [facility] Emergency Department.  Narinder Vasquez

## (undated) NOTE — LETTER
Date: 12/3/2021  Patient name: Carolina Smith  YOB: 1945  Medical Record Number: [id number]  Coverage: Payor: MEDICARE / Plan: MEDICARE PART A&B / Product Type: *No Product type* /   Insurance ID: [id number]  Patient Address: [address] [place] [address] change frequency:  Change dressing daily and/or PRN    Care Summary:  Care Summary: Discussed Plan of Care at beside with patient. Patient verbally acknowledges understanding of all instructions and all questions were answered.       Follow Up:  Return in a

## (undated) NOTE — LETTER
August 31, 2018    [address] [place], [place] [address] [address]      Dear Seymour Valdez:    Our office staff has made several attempts to contact you.  It is in our best judgment for your health and well-being that you contact our

## (undated) NOTE — LETTER
08/19/20        [address] [facility] [address]  [place] [address]      Dear Shannan Marino,    [facility] records indicate that you have outstanding lab work and or testing that was ordered for you and has not yet been completed:  Orders Placed T

## (undated) NOTE — LETTER
Your patient was recently seen at the [facility] for a hospital follow-up visit. The visit note is attached. Please contact the clinic with any questions at [phone].     Thank you,  HECTOR Conley

## (undated) NOTE — IP AVS SNAPSHOT
[address]            (For Outpatient Use Only) Initial Admit Date: 7/8/2019   Inpt/Obs Admit Date: Inpt: 7/10/19 / Obs: 07/08/19   Discharge Date:    Nereyda Velásquez:  [de-identified]   MRN: [de-identified]   CSN: [id number]   CEID: [id number] Group Number: [id number] Insurance Type: INDEMNITY   Subscriber Name: Ivelisse Smith : 1945   Subscriber ID: [id number] Pt Rel to Subscriber: [facility] Account Financial Class: Medicare    July 10, 2019

## (undated) NOTE — LETTER
07/01/19        [address] [facility]      Dear Pio Marvin,    [address] [facility] records indicate that you have outstanding lab work and or testing that was ordered for you and has not yet been completed:  Orders Plac